# Patient Record
Sex: FEMALE | Race: BLACK OR AFRICAN AMERICAN | NOT HISPANIC OR LATINO | Employment: UNEMPLOYED | URBAN - METROPOLITAN AREA
[De-identification: names, ages, dates, MRNs, and addresses within clinical notes are randomized per-mention and may not be internally consistent; named-entity substitution may affect disease eponyms.]

---

## 2019-02-18 ENCOUNTER — TELEPHONE (OUTPATIENT)
Dept: RHEUMATOLOGY | Facility: CLINIC | Age: 39
End: 2019-02-18

## 2019-02-18 NOTE — TELEPHONE ENCOUNTER
----- Message from Sukhdev Mccallum sent at 2/18/2019 10:24 AM CST -----  Contact: self  Type:  Needs Medical Advice    Who Called: Ani Maldonado  Symptoms (please be specific): n/a   How long has patient had these symptoms:  n/a  Pharmacy name and phone #:  n/a  Would the patient rather a call back or a response via [facility]? Call back  Best Call Back Number: [phone]  Additional Information: check the status of referral from PCP to get seen.    Thanks,  Sukhdev Mccallum

## 2019-02-28 ENCOUNTER — TELEPHONE (OUTPATIENT)
Dept: PRIMARY CARE CLINIC | Facility: CLINIC | Age: 39
End: 2019-02-28

## 2019-03-01 ENCOUNTER — HOSPITAL ENCOUNTER (OUTPATIENT)
Dept: RADIOLOGY | Facility: HOSPITAL | Age: 39
Discharge: HOME | End: 2019-03-01
Attending: STUDENT IN AN ORGANIZED HEALTH CARE EDUCATION/TRAINING PROGRAM
Payer: MEDICAID

## 2019-03-01 ENCOUNTER — OFFICE VISIT (OUTPATIENT)
Dept: RHEUMATOLOGY | Facility: CLINIC | Age: 39
End: 2019-03-01
Payer: MEDICAID

## 2019-03-01 VITALS
HEIGHT: 59 IN | SYSTOLIC BLOOD PRESSURE: 129 MMHG | DIASTOLIC BLOOD PRESSURE: 88 MMHG | WEIGHT: 240.06 LBS | HEART RATE: 97 BPM | BODY MASS INDEX: 48.4 KG/M2

## 2019-03-01 DIAGNOSIS — Z87.2 HISTORY OF HIDRADENITIS SUPPURATIVA: ICD-10-CM

## 2019-03-01 DIAGNOSIS — M70.61 TROCHANTERIC BURSITIS OF BOTH HIPS: ICD-10-CM

## 2019-03-01 DIAGNOSIS — M22.2X9 PATELLOFEMORAL STRESS SYNDROME, UNSPECIFIED LATERALITY: ICD-10-CM

## 2019-03-01 DIAGNOSIS — M70.62 TROCHANTERIC BURSITIS OF BOTH HIPS: ICD-10-CM

## 2019-03-01 DIAGNOSIS — M22.2X9 PATELLOFEMORAL STRESS SYNDROME, UNSPECIFIED LATERALITY: Primary | ICD-10-CM

## 2019-03-01 PROCEDURE — 73521 X-RAY EXAM HIPS BI 2 VIEWS: CPT | Mod: 26,,, | Performed by: RADIOLOGY

## 2019-03-01 PROCEDURE — 73521 X-RAY EXAM HIPS BI 2 VIEWS: CPT | Mod: TC

## 2019-03-01 PROCEDURE — 73562 X-RAY EXAM OF KNEE 3: CPT | Mod: 26,50,, | Performed by: RADIOLOGY

## 2019-03-01 PROCEDURE — 99999 PR PBB SHADOW E&M-EST. PATIENT-LVL V: ICD-10-PCS | Mod: PBBFAC,,, | Performed by: STUDENT IN AN ORGANIZED HEALTH CARE EDUCATION/TRAINING PROGRAM

## 2019-03-01 PROCEDURE — 72202 X-RAY EXAM SI JOINTS 3/> VWS: CPT | Mod: TC

## 2019-03-01 PROCEDURE — 73562 XR KNEE ORTHO BILAT: ICD-10-PCS | Mod: 26,50,, | Performed by: RADIOLOGY

## 2019-03-01 PROCEDURE — 72202 XR SACROILIAC JOINTS COMPLETE: ICD-10-PCS | Mod: 26,,, | Performed by: RADIOLOGY

## 2019-03-01 PROCEDURE — 73562 X-RAY EXAM OF KNEE 3: CPT | Mod: TC,50

## 2019-03-01 PROCEDURE — 99204 PR OFFICE/OUTPT VISIT, NEW, LEVL IV, 45-59 MIN: ICD-10-PCS | Mod: S$PBB,,, | Performed by: STUDENT IN AN ORGANIZED HEALTH CARE EDUCATION/TRAINING PROGRAM

## 2019-03-01 PROCEDURE — 73521 XR HIPS BILATERAL 2 VIEW INCL AP PELVIS: ICD-10-PCS | Mod: 26,,, | Performed by: RADIOLOGY

## 2019-03-01 PROCEDURE — 99204 OFFICE O/P NEW MOD 45 MIN: CPT | Mod: S$PBB,,, | Performed by: STUDENT IN AN ORGANIZED HEALTH CARE EDUCATION/TRAINING PROGRAM

## 2019-03-01 PROCEDURE — 99215 OFFICE O/P EST HI 40 MIN: CPT | Mod: PBBFAC,25,PN | Performed by: STUDENT IN AN ORGANIZED HEALTH CARE EDUCATION/TRAINING PROGRAM

## 2019-03-01 PROCEDURE — 72202 X-RAY EXAM SI JOINTS 3/> VWS: CPT | Mod: 26,,, | Performed by: RADIOLOGY

## 2019-03-01 PROCEDURE — 99999 PR PBB SHADOW E&M-EST. PATIENT-LVL V: CPT | Mod: PBBFAC,,, | Performed by: STUDENT IN AN ORGANIZED HEALTH CARE EDUCATION/TRAINING PROGRAM

## 2019-03-01 RX ORDER — LEVOTHYROXINE SODIUM 50 UG/1
TABLET ORAL
COMMUNITY
Start: 2019-02-26 | End: 2019-12-11 | Stop reason: SDUPTHER

## 2019-03-01 RX ORDER — METFORMIN HYDROCHLORIDE 500 MG/1
TABLET ORAL
COMMUNITY
Start: 2019-02-26 | End: 2019-11-25 | Stop reason: SDUPTHER

## 2019-03-01 RX ORDER — FERROUS SULFATE 324(65)MG
324 TABLET, DELAYED RELEASE (ENTERIC COATED) ORAL 2 TIMES DAILY
Refills: 1 | COMMUNITY
Start: 2019-02-14 | End: 2021-09-23

## 2019-03-01 RX ORDER — HYDROCHLOROTHIAZIDE 25 MG/1
TABLET ORAL
COMMUNITY
End: 2020-03-03 | Stop reason: SDUPTHER

## 2019-03-01 RX ORDER — IBUPROFEN 800 MG/1
TABLET ORAL
COMMUNITY
End: 2019-05-19 | Stop reason: SDUPTHER

## 2019-03-01 RX ORDER — CLONAZEPAM 1 MG/1
TABLET ORAL
COMMUNITY
End: 2019-03-29 | Stop reason: SDUPTHER

## 2019-03-01 RX ORDER — GABAPENTIN 300 MG/1
CAPSULE ORAL
COMMUNITY
End: 2020-05-06

## 2019-03-01 RX ORDER — ATORVASTATIN CALCIUM 40 MG/1
TABLET, FILM COATED ORAL
COMMUNITY
End: 2019-07-24 | Stop reason: SDUPTHER

## 2019-03-01 RX ORDER — DICLOFENAC SODIUM 10 MG/G
2 GEL TOPICAL 4 TIMES DAILY
Qty: 1 TUBE | Refills: 5 | Status: SHIPPED | OUTPATIENT
Start: 2019-03-01 | End: 2021-06-08 | Stop reason: SDUPTHER

## 2019-03-01 RX ORDER — MEDROXYPROGESTERONE ACETATE 150 MG/ML
INJECTION, SUSPENSION INTRAMUSCULAR
COMMUNITY
Start: 2019-01-16 | End: 2021-06-29

## 2019-03-01 NOTE — PROGRESS NOTES
RHEUMATOLOGY OUTPATIENT CLINIC NOTE    3/1/2019    Attending Rheumatologist: Heather Escalante  Primary Care Provider: Jill Palacios MD   Physician Requesting Consultation: Aaareferral Self  No address on file  Chief Complaint/Reason For Consultation:  Disease Management      Subjective:       HPI  Erica Law is a 38 y.o.  AAF presents for evaluation of elevated inflammatory markers. CRP 45 per outside records. Has recurrent hx of hidradenitis suppurtiva. Not followed by dermatology. No hx pso/psa/SpA. No hx dactylitis, uveitis, achilles tendinitis. Does have some episodic lower back pain, no relief w nsaids. No improvement w activity. +Improvement w rest. +Occasional nightime awakening. No alternating buttock pain. No hx photosensitivity, raynauds, oral ulcers, hairloss, swollen joints , frothy urine.     Main complaint is bl knee pain, mainly suprapatellar. Also episodic, currently 3/10. Hurts mostly at night. No associated swelling. Not taking anything for pain relief. Also co bilateral hip pain, hurts when laying on side    14pt ros negative except as otherwise stated above    Review of Systems   Constitutional: Negative for fever, malaise/fatigue and weight loss.   HENT: Negative for congestion, hearing loss and sore throat.    Eyes: Negative for blurred vision, pain and redness.   Respiratory: Negative for cough, sputum production and shortness of breath.    Cardiovascular: Negative for chest pain, orthopnea and claudication.   Gastrointestinal: Negative for abdominal pain, blood in stool, diarrhea, heartburn and vomiting.   Genitourinary: Negative for dysuria and frequency.   Musculoskeletal: Positive for joint pain. Negative for back pain, myalgias and neck pain.   Skin: Negative for itching and rash.   Neurological: Negative for dizziness, focal weakness and weakness.   Endo/Heme/Allergies: Negative for polydipsia. Does not bruise/bleed easily.   Psychiatric/Behavioral: Negative for  depression and hallucinations.   All other systems reviewed and are negative.      Chronic comorbid conditions affecting medical decision making today:  Past Medical History:   Diagnosis Date    Anxiety     Diabetes mellitus     Hypertension     Thyroid disease      History reviewed. No pertinent surgical history.  Family History   Problem Relation Age of Onset    Diabetes Mellitus Mother     Cancer Maternal Grandmother     Cancer Maternal Grandfather      Social History     Substance and Sexual Activity   Alcohol Use Not on file     Social History     Tobacco Use   Smoking Status Current Every Day Smoker    Types: Vaping with nicotine   Smokeless Tobacco Never Used     Social History     Substance and Sexual Activity   Drug Use Not on file       Current Outpatient Medications:     atorvastatin (LIPITOR) 40 MG tablet, atorvastatin 40 mg tablet  TAKE 1 TABLET BY MOUTH AT BEDTIME, Disp: , Rfl:     clonazePAM (KLONOPIN) 1 MG tablet, clonazepam 1 mg tablet  take 1 tab bid prn, Disp: , Rfl:     ferrous sulfate 324 mg (65 mg iron) TbEC, Take 324 mg by mouth 2 (two) times daily., Disp: , Rfl: 1    gabapentin (NEURONTIN) 300 MG capsule, gabapentin 300 mg capsule, Disp: , Rfl:     hydroCHLOROthiazide (HYDRODIURIL) 25 MG tablet, hydrochlorothiazide 25 mg tablet  TAKE 1 TABLET BY MOUTH EVERY DAY, Disp: , Rfl:     ibuprofen (ADVIL,MOTRIN) 800 MG tablet, ibuprofen 800 mg tablet  TAKE 1 TABLET BY MOUTH THREE TIMES A DAY PRN PAIN, Disp: , Rfl:     iron-vit c-vit b12-folic acid (IRON-C PLUS) tablet, Iron 100 Plus 100 mg-250 mg-25 mcg-1 mg tablet  Take 1 tablet twice a day by oral route., Disp: , Rfl:     levothyroxine (SYNTHROID) 50 MCG tablet, , Disp: , Rfl:     medroxyPROGESTERone (DEPO-PROVERA) 150 mg/mL injection, , Disp: , Rfl:     metFORMIN (GLUCOPHAGE) 500 MG tablet, , Disp: , Rfl:     diclofenac sodium (VOLTAREN) 1 % Gel, Apply 2 grams topically 4 (four) times daily., Disp: 1 Tube, Rfl: 5        Objective:         Vitals:    03/01/19 1311   BP: 129/88   Pulse: 97     Physical Exam   Constitutional: She is oriented to person, place, and time and well-developed, well-nourished, and in no distress. No distress.   HENT:   Head: Normocephalic and atraumatic.   Eyes: Conjunctivae and EOM are normal. Pupils are equal, round, and reactive to light.   Neck: Normal range of motion. Neck supple.   Cardiovascular: Normal rate and normal heart sounds.    Pulmonary/Chest: Effort normal and breath sounds normal.   Abdominal: Soft. Bowel sounds are normal. She exhibits no distension.   Neurological: She is alert and oriented to person, place, and time. Gait normal.   Skin: Skin is warm and dry. No erythema.     Psychiatric: Affect and judgment normal.   Musculoskeletal: Normal range of motion. She exhibits no edema, tenderness or deformity.   No synovitis or effusions over large joints. No tenderness on palpation mcps or lateral squeeze. No nodules or tophi   +pain w patellar compression bilaterally; no effusions or warmth over knees. +ttp bilateral trochanteric bursa. No SI tenderness. Romero little    Reviewed old and all outside pertinent medical records available.    All lab results personally reviewed and interpreted by me.  Lab Results   Component Value Date    WBC 10.13 03/01/2019    HGB 9.3 (L) 03/01/2019    HCT 31.8 (L) 03/01/2019    MCV 93 03/01/2019    MCH 27.3 03/01/2019    MCHC 29.2 (L) 03/01/2019    RDW 14.1 03/01/2019     03/01/2019    MPV 9.9 03/01/2019       No results found for: NA, K, CL, CO2, GLU, BUN, LABCREA, CALCIUM, PROT, ALBUMIN, BILITOT, AST, ALKPHOS, ALT, GFRAA, GFRNONAA    No results found for: COLORU, APPEARANCEUA, SPECGRAV, PHUR, PROTEINUA, GLUCOSEU, KETONESU, BLOODU, LEUKOCYTESUR, NITRITE, UROBILINOGEN    No results found for: CRP    No results found for: SEDRATE, ERYTHROCYTES    No results found for: SHELLY, RF, SEDRATE    No components found for: 25OHVITDTOT, 53KYJHEP6, 09SGUDKZ7,  METHODNOTE    No results found for: URICACID    No components found for: TSPOTTB       ASSESSMENT / PLAN:     Erica Law is a 38 y.o. Black or  female with:      1. Patellofemoral stress syndrome, unspecified laterality  -Discussed quad strengthening exercises  -Ice prn / tylenol prn   -Prx voltaren gel prn  -Can consider injection at rtc if no improvement    #Trochanteric bursitis  -bilaterally  -discussed behavior / activity modification  -can consider injection at rtc if no improvement    #Hidradenitis suppurtiva  -suspect likely etiology for elevated inflammatory markers  -No other features or evidence suggestive of underlying connective tissue disease  -Will complete workup as below    - Ambulatory Referral to Physical/Occupational Therapy  - HLA B27 ANTIGEN; Future  - Sedimentation rate; Standing  - Vitamin D; Future  - Magnesium; Future  - Phosphorus; Future  - X-Ray Sacroiliac Joints Complete; Future  - X-Ray Hips Bilateral [employer] AP Pelvis; Future  - SHELLY Screen w/Reflex; Future  - C-reactive protein; Future  - Comprehensive metabolic panel; Future  - CBC auto differential; Standing  - Ambulatory Referral to Dermatology    rtc 2 weeks      Follow-up in about 2 weeks (around 3/15/2019).    Method of contact with patient concerns: Kellie danielle Rheumatology      Heather Escalante M.D.  Rheumatology Department   [facility] [place] [address], [place] [address]  Phone: [phone]  Fax: [phone]

## 2019-03-15 ENCOUNTER — OFFICE VISIT (OUTPATIENT)
Dept: PRIMARY CARE CLINIC | Facility: CLINIC | Age: 39
End: 2019-03-15
Payer: MEDICAID

## 2019-03-15 VITALS
SYSTOLIC BLOOD PRESSURE: 100 MMHG | OXYGEN SATURATION: 99 % | TEMPERATURE: 98 F | DIASTOLIC BLOOD PRESSURE: 78 MMHG | HEART RATE: 83 BPM

## 2019-03-15 DIAGNOSIS — E03.9 HYPOTHYROIDISM, UNSPECIFIED TYPE: ICD-10-CM

## 2019-03-15 DIAGNOSIS — F41.9 ANXIETY: ICD-10-CM

## 2019-03-15 DIAGNOSIS — I10 ESSENTIAL HYPERTENSION: ICD-10-CM

## 2019-03-15 DIAGNOSIS — E61.1 IRON DEFICIENCY: ICD-10-CM

## 2019-03-15 DIAGNOSIS — R73.03 PREDIABETES: ICD-10-CM

## 2019-03-15 DIAGNOSIS — E78.5 HYPERLIPIDEMIA, UNSPECIFIED HYPERLIPIDEMIA TYPE: ICD-10-CM

## 2019-03-15 DIAGNOSIS — L73.2 HIDRADENITIS SUPPURATIVA: Primary | ICD-10-CM

## 2019-03-15 PROCEDURE — 99214 PR OFFICE/OUTPT VISIT, EST, LEVL IV, 30-39 MIN: ICD-10-PCS | Mod: S$GLB,,, | Performed by: FAMILY MEDICINE

## 2019-03-15 PROCEDURE — 99214 OFFICE O/P EST MOD 30 MIN: CPT | Mod: S$GLB,,, | Performed by: FAMILY MEDICINE

## 2019-03-15 RX ORDER — CLINDAMYCIN HYDROCHLORIDE 300 MG/1
300 CAPSULE ORAL EVERY 6 HOURS
Qty: 40 CAPSULE | Refills: 0 | Status: SHIPPED | OUTPATIENT
Start: 2019-03-15 | End: 2019-03-25

## 2019-03-15 RX ORDER — SULFAMETHOXAZOLE AND TRIMETHOPRIM 800; 160 MG/1; MG/1
1 TABLET ORAL DAILY
Qty: 10 TABLET | Refills: 0 | Status: SHIPPED | OUTPATIENT
Start: 2019-03-15 | End: 2019-03-25

## 2019-03-15 NOTE — PROGRESS NOTES
Subjective:       Patient ID: Erica Law is a 38 y.o. female.    Chief Complaint: Recurrent Skin Infections (On the her right buttock that started in Dec and intermittently returns. )    F/u of chronic medical conditions. Pt notes wound of R buttock that she is worried may be infected. She reports that it is painful when she sits but is not sure if there has been drainage. She does note new draining lesions of R axilla. No f/c, n/v, weakness, fatigue.    HTN - controlled    Hypothyroidism - on synthroid    HLD - on statin    PreDM - on metformin, attempting to follow healthier diet    Anxiety - has upcoming appt with psyc    Iron deficiency - compliant with iron    HPI  Review of Systems   Constitutional: Negative for chills, diaphoresis, fatigue, fever and unexpected weight change.   HENT: Negative for congestion, hearing loss, rhinorrhea, sinus pressure, sore throat, trouble swallowing and voice change.    Eyes: Negative for pain and visual disturbance.   Respiratory: Negative for cough, chest tightness, shortness of breath and wheezing.    Cardiovascular: Negative for chest pain, palpitations and leg swelling.   Gastrointestinal: Negative for abdominal pain, constipation, diarrhea, nausea and vomiting.   Genitourinary: Negative for decreased urine volume, dysuria, flank pain, frequency, hematuria, pelvic pain, vaginal bleeding and vaginal discharge.   Musculoskeletal: Negative for arthralgias, back pain, myalgias and neck pain.   Skin: Positive for wound. Negative for color change, pallor and rash.   Neurological: Negative for dizziness, syncope, weakness, light-headedness and headaches.   Hematological: Negative for adenopathy.   Psychiatric/Behavioral: Negative for decreased concentration, dysphoric mood and sleep disturbance. The patient is nervous/anxious.        Objective:      Physical Exam   Constitutional: She is oriented to person, place, and time. She appears well-developed and well-nourished. No  distress.   HENT:   Head: Normocephalic and atraumatic.   Nose: Nose normal.   Mouth/Throat: Oropharynx is clear and moist.   Eyes: Conjunctivae are normal. Pupils are equal, round, and reactive to light.   Neck: Normal range of motion. Neck supple. No JVD present. No thyromegaly present.   Cardiovascular: Normal rate, regular rhythm, normal heart sounds and intact distal pulses. Exam reveals no gallop and no friction rub.   No murmur heard.  Pulmonary/Chest: Effort normal and breath sounds normal. No stridor. No respiratory distress. She has no wheezes. She has no rales.   Abdominal: Soft. Bowel sounds are normal. She exhibits no distension. There is no tenderness.   Musculoskeletal: Normal range of motion. She exhibits no edema or tenderness.   Lymphadenopathy:     She has no cervical adenopathy.   Neurological: She is alert and oriented to person, place, and time.   Skin: Skin is warm and dry. No rash noted. She is not diaphoretic. No erythema. No pallor.   Stage 3 pressure ulcer R buttock, no soi. B/l axilla multiple small abscesses, no erythema, extensive keloid scarring   Psychiatric: She has a normal mood and affect. Her behavior is normal.   Vitals reviewed.      Assessment:       1. Hidradenitis suppurativa    2. Essential hypertension    3. Hyperlipidemia, unspecified hyperlipidemia type    4. Hypothyroidism, unspecified type    5. Prediabetes    6. Anxiety    7. Iron deficiency        Plan:       Erica was seen today for recurrent skin infections.    Diagnoses and all orders for this visit:    Hidradenitis suppurativa  -     clindamycin (CLEOCIN) 300 MG capsule; Take 1 capsule (300 mg total) by mouth every 6 (six) hours. for 10 days  -     sulfamethoxazole-trimethoprim 800-160mg (BACTRIM DS) 800-160 mg Tab; Take 1 tablet by mouth once daily. for 10 days    Essential hypertension  Comments:  controlled on current    Hyperlipidemia, unspecified hyperlipidemia type  Comments:  statin    Hypothyroidism,  unspecified type  Comments:  on synthroid    Prediabetes  Comments:  metformin, diet/exercise    Anxiety  Comments:  controlled currently, has appt with psyc    Iron deficiency  Comments:  on iron supplements

## 2019-03-21 ENCOUNTER — OFFICE VISIT (OUTPATIENT)
Dept: RHEUMATOLOGY | Facility: CLINIC | Age: 39
End: 2019-03-21
Payer: MEDICAID

## 2019-03-21 VITALS — BODY MASS INDEX: 46.01 KG/M2 | WEIGHT: 234.38 LBS | HEIGHT: 60 IN

## 2019-03-21 DIAGNOSIS — L73.2 SUPPURATIVE HIDRADENITIS: Primary | ICD-10-CM

## 2019-03-21 PROCEDURE — 99999 PR PBB SHADOW E&M-EST. PATIENT-LVL IV: CPT | Mod: PBBFAC,,, | Performed by: STUDENT IN AN ORGANIZED HEALTH CARE EDUCATION/TRAINING PROGRAM

## 2019-03-21 PROCEDURE — 99214 OFFICE O/P EST MOD 30 MIN: CPT | Mod: S$PBB,,, | Performed by: STUDENT IN AN ORGANIZED HEALTH CARE EDUCATION/TRAINING PROGRAM

## 2019-03-21 PROCEDURE — 99214 OFFICE O/P EST MOD 30 MIN: CPT | Mod: PBBFAC,PN | Performed by: STUDENT IN AN ORGANIZED HEALTH CARE EDUCATION/TRAINING PROGRAM

## 2019-03-21 PROCEDURE — 99214 PR OFFICE/OUTPT VISIT, EST, LEVL IV, 30-39 MIN: ICD-10-PCS | Mod: S$PBB,,, | Performed by: STUDENT IN AN ORGANIZED HEALTH CARE EDUCATION/TRAINING PROGRAM

## 2019-03-21 PROCEDURE — 99999 PR PBB SHADOW E&M-EST. PATIENT-LVL IV: ICD-10-PCS | Mod: PBBFAC,,, | Performed by: STUDENT IN AN ORGANIZED HEALTH CARE EDUCATION/TRAINING PROGRAM

## 2019-03-21 RX ORDER — MUPIROCIN 20 MG/G
OINTMENT TOPICAL 3 TIMES DAILY
Qty: 1 TUBE | Refills: 5 | Status: SHIPPED | OUTPATIENT
Start: 2019-03-21 | End: 2021-06-29

## 2019-03-21 NOTE — PROGRESS NOTES
RHEUMATOLOGY OUTPATIENT CLINIC NOTE    3/21/2019    Attending Rheumatologist: Heather Escalante  Primary Care Provider: Jill Palacios MD   Physician Requesting Consultation: No referring provider defined for this encounter.  Chief Complaint/Reason For Consultation:  Hidradenitis suppurativa    Subjective:       HPI  Historical:  Erica Law is a 38 y.o.  AAF presents for evaluation of elevated inflammatory markers. CRP 45 per outside records. Has recurrent hx of hidradenitis suppurtiva. Not followed by dermatology. No hx pso/psa/SpA. No hx dactylitis, uveitis, achilles tendinitis. Does have some episodic lower back pain, no relief w nsaids. No improvement w activity. +Improvement w rest. +Occasional nightime awakening. No alternating buttock pain. No hx photosensitivity, raynauds, oral ulcers, hairloss, swollen joints , frothy urine.     Main complaint is bl knee pain, mainly suprapatellar. Also episodic, currently 3/10. Hurts mostly at night. No associated swelling. Not taking anything for pain relief. Also co bilateral hip pain, hurts when laying on side    Today:  Pt denies any joint pain at all today. No knee, hip, or lower back pain. Pain 0/10.     14pt ros negative except as otherwise stated above    Review of Systems   Constitutional: Negative for fever, malaise/fatigue and weight loss.   HENT: Negative for congestion, hearing loss and sore throat.    Eyes: Negative for blurred vision, pain and redness.   Respiratory: Negative for cough, sputum production and shortness of breath.    Cardiovascular: Negative for chest pain, orthopnea and claudication.   Gastrointestinal: Negative for abdominal pain, blood in stool, diarrhea, heartburn and vomiting.   Genitourinary: Negative for dysuria and frequency.   Musculoskeletal: Positive for joint pain. Negative for back pain, myalgias and neck pain.   Skin: Negative for itching and rash.   Neurological: Negative for dizziness, focal weakness and weakness.    Endo/Heme/Allergies: Negative for polydipsia. Does not bruise/bleed easily.   Psychiatric/Behavioral: Negative for depression and hallucinations.   All other systems reviewed and are negative.      Chronic comorbid conditions affecting medical decision making today:  Past Medical History:   Diagnosis Date    Anxiety     Diabetes mellitus     Hypertension     Thyroid disease      History reviewed. No pertinent surgical history.  Family History   Problem Relation Age of Onset    Diabetes Mellitus Mother     Cancer Maternal Grandmother     Cancer Maternal Grandfather      Social History     Substance and Sexual Activity   Alcohol Use Not on file     Social History     Tobacco Use   Smoking Status Current Every Day Smoker    Types: Vaping with nicotine   Smokeless Tobacco Never Used     Social History     Substance and Sexual Activity   Drug Use Not on file       Current Outpatient Medications:     atorvastatin (LIPITOR) 40 MG tablet, atorvastatin 40 mg tablet  TAKE 1 TABLET BY MOUTH AT BEDTIME, Disp: , Rfl:     clindamycin (CLEOCIN) 300 MG capsule, Take 1 capsule (300 mg total) by mouth every 6 (six) hours. for 10 days, Disp: 40 capsule, Rfl: 0    clonazePAM (KLONOPIN) 1 MG tablet, clonazepam 1 mg tablet  take 1 tab bid prn, Disp: , Rfl:     diclofenac sodium (VOLTAREN) 1 % Gel, Apply 2 grams topically 4 (four) times daily., Disp: 1 Tube, Rfl: 5    ferrous sulfate 324 mg (65 mg iron) TbEC, Take 324 mg by mouth 2 (two) times daily., Disp: , Rfl: 1    gabapentin (NEURONTIN) 300 MG capsule, gabapentin 300 mg capsule, Disp: , Rfl:     hydroCHLOROthiazide (HYDRODIURIL) 25 MG tablet, hydrochlorothiazide 25 mg tablet  TAKE 1 TABLET BY MOUTH EVERY DAY, Disp: , Rfl:     ibuprofen (ADVIL,MOTRIN) 800 MG tablet, ibuprofen 800 mg tablet  TAKE 1 TABLET BY MOUTH THREE TIMES A DAY PRN PAIN, Disp: , Rfl:     iron-vit c-vit b12-folic acid (IRON-C PLUS) tablet, Iron 100 Plus 100 mg-250 mg-25 mcg-1 mg tablet  Take 1  tablet twice a day by oral route., Disp: , Rfl:     levothyroxine (SYNTHROID) 50 MCG tablet, , Disp: , Rfl:     medroxyPROGESTERone (DEPO-PROVERA) 150 mg/mL injection, , Disp: , Rfl:     metFORMIN (GLUCOPHAGE) 500 MG tablet, , Disp: , Rfl:     sulfamethoxazole-trimethoprim 800-160mg (BACTRIM DS) 800-160 mg Tab, Take 1 tablet by mouth once daily. for 10 days, Disp: 10 tablet, Rfl: 0    mupirocin (BACTROBAN) 2 % ointment, Apply topically 3 (three) times daily., Disp: 1 Tube, Rfl: 5       Objective:         There were no vitals filed for this visit.  Physical Exam   Constitutional: She is oriented to person, place, and time and well-developed, well-nourished, and in no distress. No distress.   HENT:   Head: Normocephalic and atraumatic.   Eyes: Conjunctivae and EOM are normal. Pupils are equal, round, and reactive to light.   Neck: Normal range of motion. Neck supple.   Cardiovascular: Normal rate and normal heart sounds.    Pulmonary/Chest: Effort normal and breath sounds normal.   Abdominal: Soft. Bowel sounds are normal. She exhibits no distension.   Neurological: She is alert and oriented to person, place, and time. Gait normal.   Skin: Skin is warm and dry. No erythema.     Psychiatric: Affect and judgment normal.   Musculoskeletal: Normal range of motion. She exhibits no edema, tenderness or deformity.   No synovitis or effusions over large joints. No tenderness on palpation mcps or lateral squeeze. No nodules or tophi   +pain w patellar compression bilaterally; no effusions or warmth over knees. +ttp bilateral trochanteric bursa. No SI tenderness. Romero little    Reviewed old and all outside pertinent medical records available.    All lab results personally reviewed and interpreted by me.  Lab Results   Component Value Date    WBC 10.13 03/01/2019    HGB 9.3 (L) 03/01/2019    HCT 31.8 (L) 03/01/2019    MCV 93 03/01/2019    MCH 27.3 03/01/2019    MCHC 29.2 (L) 03/01/2019    RDW 14.1 03/01/2019      03/01/2019    MPV 9.9 03/01/2019       Lab Results   Component Value Date     03/01/2019    K 3.9 03/01/2019     (H) 03/01/2019    CO2 23 03/01/2019     (H) 03/01/2019    BUN 16 03/01/2019    CALCIUM 9.7 03/01/2019    PROT 8.3 03/01/2019    ALBUMIN 3.0 (L) 03/01/2019    BILITOT 0.2 03/01/2019    AST 11 03/01/2019    ALKPHOS 64 03/01/2019    ALT 13 03/01/2019       No results found for: COLORU, APPEARANCEUA, SPECGRAV, PHUR, PROTEINUA, GLUCOSEU, KETONESU, BLOODU, LEUKOCYTESUR, NITRITE, UROBILINOGEN    Lab Results   Component Value Date    CRP 39.2 (H) 03/01/2019       Lab Results   Component Value Date    SEDRATE 80 (H) 03/01/2019       Lab Results   Component Value Date    SEDRATE 80 (H) 03/01/2019       No components found for: 25OHVITDTOT, 38WGSRSL0, 60GXNGLV7, METHODNOTE    No results found for: URICACID    No components found for: TSPOTTB     SHELLY-  HLAB27-    Knee XR  Joint spaces are maintained.  No marginal spurring.  No fracture or dislocation.  Possible bilateral effusions.    Hip XR  Overlying bowel gas limits evaluation of the lower sacrum.  Hip joints are symmetric in appearance.  No fracture or dislocation.  Pelvic ring is intact.    XR SI  The sacroiliac joints are intact with symmetric appearance.  No areas of erosion or sclerosis are identified.  No evidence of ankylosis.  No acute osseous abnormality.   ASSESSMENT / PLAN:     Erica Law is a 38 y.o. Black or  female with:      1. Patellofemoral stress syndrome, unspecified laterality  -Discussed quad strengthening exercises  -Ice prn / tylenol prn   -Prx voltaren gel prn    #Trochanteric bursitis  -bilaterally  -discussed behavior / activity modification  -can consider injection at rtc if no improvement    #Hidradenitis suppurtiva  -suspect likely etiology for elevated inflammatory markers  -No other features or evidence suggestive of underlying connective tissue disease  -Referral placed to dermatology and  general surgery for evaluation further treatment options       rtc prn      No Follow-up on file.    Method of contact with patient concerns: Kellie attn Rheumatology      Heather Escalante M.D.  Rheumatology Department   [facility] [place] [address], [place] [address]  Phone: [phone]  Fax: [phone]

## 2019-03-29 ENCOUNTER — OFFICE VISIT (OUTPATIENT)
Dept: PRIMARY CARE CLINIC | Facility: CLINIC | Age: 39
End: 2019-03-29
Payer: MEDICAID

## 2019-03-29 ENCOUNTER — TELEPHONE (OUTPATIENT)
Dept: PRIMARY CARE CLINIC | Facility: CLINIC | Age: 39
End: 2019-03-29

## 2019-03-29 VITALS
HEART RATE: 88 BPM | DIASTOLIC BLOOD PRESSURE: 80 MMHG | BODY MASS INDEX: 48.04 KG/M2 | OXYGEN SATURATION: 98 % | TEMPERATURE: 98 F | WEIGHT: 246 LBS | SYSTOLIC BLOOD PRESSURE: 110 MMHG

## 2019-03-29 DIAGNOSIS — L02.91 ABSCESS: Primary | ICD-10-CM

## 2019-03-29 DIAGNOSIS — F41.9 ANXIETY: ICD-10-CM

## 2019-03-29 PROCEDURE — 99214 PR OFFICE/OUTPT VISIT, EST, LEVL IV, 30-39 MIN: ICD-10-PCS | Mod: S$GLB,,, | Performed by: FAMILY MEDICINE

## 2019-03-29 PROCEDURE — 99214 OFFICE O/P EST MOD 30 MIN: CPT | Mod: S$GLB,,, | Performed by: FAMILY MEDICINE

## 2019-03-29 RX ORDER — CLONAZEPAM 1 MG/1
1 TABLET ORAL 2 TIMES DAILY PRN
Qty: 60 TABLET | Refills: 0 | Status: SHIPPED | OUTPATIENT
Start: 2019-03-29 | End: 2019-04-30 | Stop reason: SDUPTHER

## 2019-03-29 NOTE — PROGRESS NOTES
Subjective:       Patient ID: Erica Law is a 38 y.o. female.    Chief Complaint: Follow-up (refill) and Rash (under the stomach/vaginal area-swollen with some pain, for the past 4 days)    HPI     Here for mass of R suprapubic area. Pt says it has been present for 4-5 days and is painful. She has been taking ibuprofen for pain with improvement. No fever, chills, drainage, erythema. She has had similar events in the past (abscesses) but those were of axillary areas.    Review of Systems   Constitutional: Negative for chills, diaphoresis, fatigue, fever and unexpected weight change.   Eyes: Negative for pain and visual disturbance.   Respiratory: Negative for cough, chest tightness, shortness of breath and wheezing.    Cardiovascular: Negative for chest pain, palpitations and leg swelling.   Gastrointestinal: Negative for abdominal pain, constipation, diarrhea, nausea and vomiting.   Genitourinary: Negative for dysuria and frequency.   Musculoskeletal: Positive for myalgias.   Skin: Negative for rash.   Neurological: Negative for dizziness, syncope, weakness, light-headedness and headaches.   Hematological: Negative for adenopathy.   Psychiatric/Behavioral: Negative for decreased concentration, dysphoric mood and sleep disturbance. The patient is not nervous/anxious.        Objective:      Physical Exam   Constitutional: She is oriented to person, place, and time. She appears well-developed and well-nourished. No distress.   HENT:   Head: Normocephalic and atraumatic.   Nose: Nose normal.   Mouth/Throat: Oropharynx is clear and moist.   Eyes: Conjunctivae are normal.   Neck: Normal range of motion. Neck supple.   Cardiovascular: Normal rate, regular rhythm and normal heart sounds. Exam reveals no gallop and no friction rub.   No murmur heard.  Pulmonary/Chest: Effort normal and breath sounds normal. No stridor. No respiratory distress. She has no wheezes.   Abdominal: Soft. Bowel sounds are normal.    Neurological: She is alert and oriented to person, place, and time.   Skin: Skin is warm and dry. She is not diaphoretic.   Large firm mass of R suprapubic area on deep palpation   Psychiatric: She has a normal mood and affect. Her behavior is normal.   Vitals reviewed.      Assessment:       1. Abscess    2. Anxiety        Plan:       Erica was seen today for follow-up and rash.    Diagnoses and all orders for this visit:    Abscess  -     US Abdomen Limited; Future  -     US Abdomen Limited  -     US Extremity Non Vascular Complete Right; Future  -     US Extremity Non Vascular Complete Right    Anxiety  -     clonazePAM (KLONOPIN) 1 MG tablet; Take 1 tablet (1 mg total) by mouth 2 (two) times daily as needed for Anxiety.    Suspected deep abscess, will get ultrasound - pt scheduled for 11:30. Will f/u report and notify of results. Advised that she will likely need to be evaluated in ED.

## 2019-04-30 ENCOUNTER — OFFICE VISIT (OUTPATIENT)
Dept: PRIMARY CARE CLINIC | Facility: CLINIC | Age: 39
End: 2019-04-30
Payer: MEDICAID

## 2019-04-30 VITALS
HEART RATE: 93 BPM | DIASTOLIC BLOOD PRESSURE: 70 MMHG | SYSTOLIC BLOOD PRESSURE: 118 MMHG | OXYGEN SATURATION: 99 % | BODY MASS INDEX: 46.68 KG/M2 | WEIGHT: 239 LBS

## 2019-04-30 DIAGNOSIS — F41.9 ANXIETY: ICD-10-CM

## 2019-04-30 DIAGNOSIS — I10 ESSENTIAL HYPERTENSION: ICD-10-CM

## 2019-04-30 DIAGNOSIS — E03.9 HYPOTHYROIDISM, UNSPECIFIED TYPE: ICD-10-CM

## 2019-04-30 DIAGNOSIS — E78.5 HYPERLIPIDEMIA, UNSPECIFIED HYPERLIPIDEMIA TYPE: ICD-10-CM

## 2019-04-30 DIAGNOSIS — L73.2 HIDRADENITIS SUPPURATIVA: Primary | ICD-10-CM

## 2019-04-30 DIAGNOSIS — R73.03 PREDIABETES: ICD-10-CM

## 2019-04-30 PROCEDURE — 99214 PR OFFICE/OUTPT VISIT, EST, LEVL IV, 30-39 MIN: ICD-10-PCS | Mod: S$GLB,,, | Performed by: FAMILY MEDICINE

## 2019-04-30 PROCEDURE — 99214 OFFICE O/P EST MOD 30 MIN: CPT | Mod: S$GLB,,, | Performed by: FAMILY MEDICINE

## 2019-04-30 RX ORDER — CLONAZEPAM 1 MG/1
1 TABLET ORAL 2 TIMES DAILY PRN
Qty: 60 TABLET | Refills: 0 | Status: SHIPPED | OUTPATIENT
Start: 2019-04-30 | End: 2019-05-30 | Stop reason: SDUPTHER

## 2019-04-30 RX ORDER — CLINDAMYCIN HYDROCHLORIDE 300 MG/1
300 CAPSULE ORAL EVERY 6 HOURS
Qty: 20 CAPSULE | Refills: 0 | Status: SHIPPED | OUTPATIENT
Start: 2019-04-30 | End: 2019-05-05

## 2019-04-30 RX ORDER — SULFAMETHOXAZOLE AND TRIMETHOPRIM 800; 160 MG/1; MG/1
1 TABLET ORAL DAILY
Qty: 5 TABLET | Refills: 0 | Status: SHIPPED | OUTPATIENT
Start: 2019-04-30 | End: 2019-05-05

## 2019-04-30 NOTE — PROGRESS NOTES
Subjective:       Patient ID: Erica Law is a 39 y.o. female.    Chief Complaint: Follow-up (Refill klonopin)    HPI     F/u of chronic medical conditions. Pt hospitalized recently after found to have large abd abscess. gen surg saw pt inpatient and took to OR. 4 abd abscess found and I&D'd. She has noticed a small firm lesion of RLE but no pain or erythema, f/c.     Anxiety - needs refill of clonazepam, does not take before driving. Takes bid prn.    Hypothyroidism - on synthroid, compliant    Predm - on metformin, attempting dietary modifications    NEY - takes iron bid or tid, depending on gi     HTN - controlled on current    HLD - on statin      Review of Systems   Constitutional: Negative for chills, fatigue and fever.   Eyes: Negative for visual disturbance.   Respiratory: Negative for cough, chest tightness, shortness of breath and wheezing.    Cardiovascular: Negative for chest pain and palpitations.   Gastrointestinal: Negative for abdominal pain, constipation, diarrhea, nausea and vomiting.   Genitourinary: Negative for dysuria, frequency and pelvic pain.   Musculoskeletal: Positive for myalgias. Negative for arthralgias.   Skin: Negative for color change, pallor and rash.   Neurological: Negative for dizziness, syncope, weakness, light-headedness and headaches.   Psychiatric/Behavioral: Negative for dysphoric mood and sleep disturbance. The patient is nervous/anxious.        Objective:      Physical Exam   Constitutional: She is oriented to person, place, and time. She appears well-developed and well-nourished. No distress.   HENT:   Head: Normocephalic and atraumatic.   Nose: Nose normal.   Mouth/Throat: Oropharynx is clear and moist.   Eyes: Conjunctivae are normal.   Neck: Normal range of motion. Neck supple. No thyromegaly present.   Cardiovascular: Normal rate, regular rhythm and normal heart sounds. Exam reveals no gallop and no friction rub.   No murmur heard.  Pulmonary/Chest: Effort  normal and breath sounds normal. No stridor. No respiratory distress. She has no wheezes.   Abdominal: Soft. Bowel sounds are normal.   Musculoskeletal: She exhibits no edema.   Neurological: She is alert and oriented to person, place, and time.   Skin: Skin is warm and dry. She is not diaphoretic. No pallor.   Chronic scarring b/l axilla, small (about 1 cm) firm lesion of RLE. No wound, erythema, or tenderness   Psychiatric: She has a normal mood and affect. Her behavior is normal.   Vitals reviewed.      Assessment:       1. Hidradenitis suppurativa    2. Anxiety    3. Essential hypertension    4. Hyperlipidemia, unspecified hyperlipidemia type    5. Hypothyroidism, unspecified type    6. Prediabetes        Plan:       Erica was seen today for follow-up.    Diagnoses and all orders for this visit:    Hidradenitis suppurativa  Comments:  given ed precautions  Orders:  -     sulfamethoxazole-trimethoprim 800-160mg (BACTRIM DS) 800-160 mg Tab; Take 1 tablet by mouth once daily. for 5 days  -     clindamycin (CLEOCIN) 300 MG capsule; Take 1 capsule (300 mg total) by mouth every 6 (six) hours. for 5 days    Anxiety  -     clonazePAM (KLONOPIN) 1 MG tablet; Take 1 tablet (1 mg total) by mouth 2 (two) times daily as needed for Anxiety.    Essential hypertension  Comments:  controlled    Hyperlipidemia, unspecified hyperlipidemia type  Comments:  statin    Hypothyroidism, unspecified type  Comments:  continue current dose synthroid    Prediabetes  Comments:  continie metformin

## 2019-05-09 ENCOUNTER — OFFICE VISIT (OUTPATIENT)
Dept: PRIMARY CARE CLINIC | Facility: CLINIC | Age: 39
End: 2019-05-09
Payer: MEDICAID

## 2019-05-09 VITALS — HEART RATE: 96 BPM | SYSTOLIC BLOOD PRESSURE: 120 MMHG | DIASTOLIC BLOOD PRESSURE: 80 MMHG | OXYGEN SATURATION: 99 %

## 2019-05-09 DIAGNOSIS — L73.2 HIDRADENITIS SUPPURATIVA: Primary | ICD-10-CM

## 2019-05-09 PROCEDURE — 99213 OFFICE O/P EST LOW 20 MIN: CPT | Mod: S$GLB,,, | Performed by: FAMILY MEDICINE

## 2019-05-09 PROCEDURE — 99213 PR OFFICE/OUTPT VISIT, EST, LEVL III, 20-29 MIN: ICD-10-PCS | Mod: S$GLB,,, | Performed by: FAMILY MEDICINE

## 2019-05-09 NOTE — PROGRESS NOTES
Subjective:       Patient ID: Erica Law is a 39 y.o. female.    Chief Complaint: Follow-up    HPI     F/u HS - pt notes improvement in RLE lesion. Also reports that she was noting some mild drainage from abd incision (from abscess I&D) which has also improved. She is trying to keep the area dry. No f/c, n/v, fatigue, erythema.     Review of Systems   Constitutional: Negative for chills, fatigue and fever.   Eyes: Negative for visual disturbance.   Respiratory: Negative for cough and shortness of breath.    Cardiovascular: Negative for chest pain and palpitations.   Gastrointestinal: Negative for abdominal pain, nausea and vomiting.   Genitourinary: Negative for dysuria and frequency.   Musculoskeletal: Negative for arthralgias and myalgias.   Skin: Positive for wound. Negative for color change, pallor and rash.   Neurological: Negative for dizziness, weakness, light-headedness and headaches.       Objective:      Physical Exam   Constitutional: She is oriented to person, place, and time. She appears well-developed and well-nourished. No distress.   HENT:   Head: Normocephalic and atraumatic.   Eyes: Conjunctivae are normal.   Neck: Normal range of motion. Neck supple.   Cardiovascular: Normal rate, regular rhythm and normal heart sounds. Exam reveals no gallop and no friction rub.   No murmur heard.  Pulmonary/Chest: Effort normal and breath sounds normal. No stridor. No respiratory distress. She has no wheezes.   Abdominal: Soft. Bowel sounds are normal.   Small (about 1 cm) superficial opening of abd incision site, without SOI    Musculoskeletal: She exhibits no edema.   Neurological: She is alert and oriented to person, place, and time.   Skin: Skin is warm and dry. She is not diaphoretic. No pallor.   Psychiatric: She has a normal mood and affect. Her behavior is normal.   Vitals reviewed.      Assessment:       1. Hidradenitis suppurativa        Plan:       Erica was seen today for  follow-up.    Diagnoses and all orders for this visit:    Hidradenitis suppurativa  Comments:  completed course of abx with improvement, continue to monitor closely

## 2019-05-19 RX ORDER — IBUPROFEN 800 MG/1
TABLET ORAL
Qty: 270 TABLET | Refills: 0 | Status: SHIPPED | OUTPATIENT
Start: 2019-05-19 | End: 2019-08-19 | Stop reason: SDUPTHER

## 2019-05-23 ENCOUNTER — TELEPHONE (OUTPATIENT)
Dept: PRIMARY CARE CLINIC | Facility: CLINIC | Age: 39
End: 2019-05-23

## 2019-05-24 RX ORDER — SULFAMETHOXAZOLE AND TRIMETHOPRIM 800; 160 MG/1; MG/1
1 TABLET ORAL 2 TIMES DAILY
Qty: 14 TABLET | Refills: 0 | Status: SHIPPED | OUTPATIENT
Start: 2019-05-24 | End: 2019-05-31

## 2019-05-30 DIAGNOSIS — F41.9 ANXIETY: Primary | ICD-10-CM

## 2019-05-31 RX ORDER — CLONAZEPAM 1 MG/1
1 TABLET ORAL 2 TIMES DAILY PRN
Qty: 60 TABLET | Refills: 0 | Status: SHIPPED | OUTPATIENT
Start: 2019-05-31 | End: 2019-06-25 | Stop reason: SDUPTHER

## 2019-06-13 ENCOUNTER — OFFICE VISIT (OUTPATIENT)
Dept: PRIMARY CARE CLINIC | Facility: CLINIC | Age: 39
End: 2019-06-13
Payer: MEDICAID

## 2019-06-13 VITALS
HEART RATE: 108 BPM | WEIGHT: 230 LBS | BODY MASS INDEX: 44.92 KG/M2 | SYSTOLIC BLOOD PRESSURE: 122 MMHG | OXYGEN SATURATION: 99 % | DIASTOLIC BLOOD PRESSURE: 88 MMHG

## 2019-06-13 DIAGNOSIS — F32.A DEPRESSION, UNSPECIFIED DEPRESSION TYPE: ICD-10-CM

## 2019-06-13 DIAGNOSIS — E03.9 HYPOTHYROIDISM, UNSPECIFIED TYPE: ICD-10-CM

## 2019-06-13 DIAGNOSIS — L73.2 HIDRADENITIS SUPPURATIVA: ICD-10-CM

## 2019-06-13 DIAGNOSIS — I10 ESSENTIAL HYPERTENSION: ICD-10-CM

## 2019-06-13 DIAGNOSIS — Z00.00 PREVENTATIVE HEALTH CARE: Primary | ICD-10-CM

## 2019-06-13 DIAGNOSIS — R73.09 ELEVATED GLUCOSE: ICD-10-CM

## 2019-06-13 PROCEDURE — 99214 OFFICE O/P EST MOD 30 MIN: CPT | Mod: S$GLB,,, | Performed by: FAMILY MEDICINE

## 2019-06-13 PROCEDURE — 99214 PR OFFICE/OUTPT VISIT, EST, LEVL IV, 30-39 MIN: ICD-10-PCS | Mod: S$GLB,,, | Performed by: FAMILY MEDICINE

## 2019-06-13 RX ORDER — ESCITALOPRAM OXALATE 5 MG/1
5 TABLET ORAL DAILY
Qty: 30 TABLET | Refills: 1 | Status: SHIPPED | OUTPATIENT
Start: 2019-06-13 | End: 2019-08-10 | Stop reason: SDUPTHER

## 2019-06-13 NOTE — PROGRESS NOTES
Subjective:       Patient ID: Erica Law is a 39 y.o. female.    Chief Complaint: Follow-up    HPI     F/u of chronic medical conditions.    HS - needs referral to derm for formal dx. She has an appt with derm in [place] In oct but would like to expedite, willing to pay out of pocket for an appt here. She was recently seen in ED for b/l axilla abscesses, was given IV abx in ED and dc'd home with rx for a week of abx. The abscess on her lower abd drained with warm compresses. She did see a (plastics?) specialist who rec'd multiple surgeries and a skin graft. Pt would like a second opinion.    Depression - pt reports feeling depressed due to chronic disease. She feels frustrated that her family members sometimes have to help care for her and she is having difficulty working.    HTN - controlled on current    Hypothyroidism - due for tfts, compliant with med    Review of Systems   Constitutional: Negative for chills, fatigue and fever.   Eyes: Negative for visual disturbance.   Respiratory: Negative for cough, chest tightness, shortness of breath and wheezing.    Cardiovascular: Negative for chest pain, palpitations and leg swelling.   Gastrointestinal: Negative for abdominal pain, constipation, diarrhea, nausea and vomiting.   Genitourinary: Negative for decreased urine volume, dysuria and frequency.   Musculoskeletal: Negative for arthralgias, back pain and myalgias.   Skin: Positive for wound. Negative for pallor and rash.   Neurological: Negative for dizziness, syncope, weakness, light-headedness, numbness and headaches.   Psychiatric/Behavioral: Positive for dysphoric mood. Negative for sleep disturbance. The patient is nervous/anxious.        Objective:      Physical Exam   Constitutional: She is oriented to person, place, and time. She appears well-developed and well-nourished. No distress.   HENT:   Head: Normocephalic and atraumatic.   Nose: Nose normal.   Mouth/Throat: Oropharynx is clear and moist.    Eyes: Conjunctivae are normal.   Neck: Normal range of motion. Neck supple. No JVD present. No thyromegaly present.   Cardiovascular: Normal rate, regular rhythm, normal heart sounds and intact distal pulses. Exam reveals no gallop and no friction rub.   No murmur heard.  Pulmonary/Chest: Effort normal and breath sounds normal. No stridor. No respiratory distress. She has no wheezes. She has no rales.   Abdominal: Soft. Bowel sounds are normal. She exhibits no distension. There is no tenderness.   Musculoskeletal: She exhibits no edema or tenderness.   Lymphadenopathy:     She has no cervical adenopathy.   Neurological: She is alert and oriented to person, place, and time.   Skin: Skin is warm and dry. No rash noted. She is not diaphoretic. No erythema. No pallor.   Extensive scarring of b/l axilla   Psychiatric: She has a normal mood and affect. Her behavior is normal.   Vitals reviewed.      Assessment:       1. Preventative health care    2. Hidradenitis suppurativa    3. Depression, unspecified depression type    4. Essential hypertension    5. Hypothyroidism, unspecified type    6. Elevated glucose        Plan:       Erica was seen today for follow-up.    Diagnoses and all orders for this visit:    Preventative health care  -     Lipid panel; Future  -     Lipid panel    Hidradenitis suppurativa  -     CBC auto differential; Future  -     CBC auto differential  -     Ambulatory Referral to Dermatology    Depression, unspecified depression type  -     escitalopram oxalate (LEXAPRO) 5 MG Tab; Take 1 tablet (5 mg total) by mouth once daily.    Essential hypertension    Hypothyroidism, unspecified type  -     TSH; Future  -     T4, free; Future  -     T3, free; Future  -     TSH  -     T4, free  -     T3, free    Elevated glucose  -     Hemoglobin A1c; Future  -     Comprehensive metabolic panel; Future  -     Hemoglobin A1c  -     Comprehensive metabolic panel

## 2019-06-14 LAB
ALBUMIN SERPL-MCNC: 4 G/DL (ref 3.6–5.1)
ALBUMIN/GLOB SERPL: 1 (CALC) (ref 1–2.5)
ALP SERPL-CCNC: 70 U/L (ref 33–115)
ALT SERPL-CCNC: 11 U/L (ref 6–29)
AST SERPL-CCNC: 11 U/L (ref 10–30)
BASOPHILS # BLD AUTO: 27 CELLS/UL (ref 0–200)
BASOPHILS NFR BLD AUTO: 0.2 %
BILIRUB SERPL-MCNC: 0.3 MG/DL (ref 0.2–1.2)
BUN SERPL-MCNC: 20 MG/DL (ref 7–25)
BUN/CREAT SERPL: ABNORMAL (CALC) (ref 6–22)
CALCIUM SERPL-MCNC: 9.3 MG/DL (ref 8.6–10.2)
CHLORIDE SERPL-SCNC: 107 MMOL/L (ref 98–110)
CHOLEST SERPL-MCNC: 156 MG/DL
CHOLEST/HDLC SERPL: 3.5 (CALC)
CO2 SERPL-SCNC: 20 MMOL/L (ref 20–32)
CREAT SERPL-MCNC: 0.84 MG/DL (ref 0.5–1.1)
EOSINOPHIL # BLD AUTO: 134 CELLS/UL (ref 15–500)
EOSINOPHIL NFR BLD AUTO: 1 %
ERYTHROCYTE [DISTWIDTH] IN BLOOD BY AUTOMATED COUNT: 13.8 % (ref 11–15)
GFRSERPLBLD MDRD-ARVRAT: 88 ML/MIN/1.73M2
GLOBULIN SER CALC-MCNC: 4 G/DL (CALC) (ref 1.9–3.7)
GLUCOSE SERPL-MCNC: 129 MG/DL (ref 65–99)
HBA1C MFR BLD: 5.9 % OF TOTAL HGB
HCT VFR BLD AUTO: 33.5 % (ref 35–45)
HDLC SERPL-MCNC: 44 MG/DL
HGB BLD-MCNC: 10.8 G/DL (ref 11.7–15.5)
LDLC SERPL CALC-MCNC: 85 MG/DL (CALC)
LYMPHOCYTES # BLD AUTO: 2868 CELLS/UL (ref 850–3900)
LYMPHOCYTES NFR BLD AUTO: 21.4 %
MCH RBC QN AUTO: 28.1 PG (ref 27–33)
MCHC RBC AUTO-ENTMCNC: 32.2 G/DL (ref 32–36)
MCV RBC AUTO: 87.2 FL (ref 80–100)
MONOCYTES # BLD AUTO: 764 CELLS/UL (ref 200–950)
MONOCYTES NFR BLD AUTO: 5.7 %
NEUTROPHILS # BLD AUTO: 9608 CELLS/UL (ref 1500–7800)
NEUTROPHILS NFR BLD AUTO: 71.7 %
NONHDLC SERPL-MCNC: 112 MG/DL (CALC)
PLATELET # BLD AUTO: 352 THOUSAND/UL (ref 140–400)
PMV BLD REES-ECKER: 10 FL (ref 7.5–12.5)
POTASSIUM SERPL-SCNC: 3.4 MMOL/L (ref 3.5–5.3)
PROT SERPL-MCNC: 8 G/DL (ref 6.1–8.1)
RBC # BLD AUTO: 3.84 MILLION/UL (ref 3.8–5.1)
SODIUM SERPL-SCNC: 138 MMOL/L (ref 135–146)
T3FREE SERPL-MCNC: 2.8 PG/ML (ref 2.3–4.2)
T4 FREE SERPL-MCNC: 1.2 NG/DL (ref 0.8–1.8)
TRIGL SERPL-MCNC: 172 MG/DL
TSH SERPL-ACNC: 2.5 MIU/L
WBC # BLD AUTO: 13.4 THOUSAND/UL (ref 3.8–10.8)

## 2019-06-25 ENCOUNTER — OFFICE VISIT (OUTPATIENT)
Dept: PRIMARY CARE CLINIC | Facility: CLINIC | Age: 39
End: 2019-06-25
Payer: MEDICAID

## 2019-06-25 VITALS
HEART RATE: 99 BPM | SYSTOLIC BLOOD PRESSURE: 118 MMHG | OXYGEN SATURATION: 98 % | DIASTOLIC BLOOD PRESSURE: 80 MMHG | WEIGHT: 229 LBS | BODY MASS INDEX: 44.72 KG/M2

## 2019-06-25 DIAGNOSIS — F41.1 GENERALIZED ANXIETY DISORDER: ICD-10-CM

## 2019-06-25 DIAGNOSIS — E78.5 HYPERLIPIDEMIA, UNSPECIFIED HYPERLIPIDEMIA TYPE: ICD-10-CM

## 2019-06-25 DIAGNOSIS — R73.03 PREDIABETES: ICD-10-CM

## 2019-06-25 DIAGNOSIS — L73.2 HIDRADENITIS SUPPURATIVA: Primary | ICD-10-CM

## 2019-06-25 DIAGNOSIS — F32.A DEPRESSION, UNSPECIFIED DEPRESSION TYPE: ICD-10-CM

## 2019-06-25 DIAGNOSIS — I10 ESSENTIAL HYPERTENSION: ICD-10-CM

## 2019-06-25 PROCEDURE — 99214 PR OFFICE/OUTPT VISIT, EST, LEVL IV, 30-39 MIN: ICD-10-PCS | Mod: S$GLB,,, | Performed by: FAMILY MEDICINE

## 2019-06-25 PROCEDURE — 99214 OFFICE O/P EST MOD 30 MIN: CPT | Mod: S$GLB,,, | Performed by: FAMILY MEDICINE

## 2019-06-25 RX ORDER — CLONAZEPAM 1 MG/1
1 TABLET ORAL 2 TIMES DAILY PRN
Qty: 60 TABLET | Refills: 0 | Status: SHIPPED | OUTPATIENT
Start: 2019-06-25 | End: 2019-07-29 | Stop reason: SDUPTHER

## 2019-06-25 NOTE — PROGRESS NOTES
Subjective:       Patient ID: Erica Law is a 39 y.o. female.    Chief Complaint: f/u HS    HPI     F/u of chronic medical conditions.    HS - has appt coming up July 5th w/ derm. She was given IV abx in ED in [place] during vacation. She was having a break out on face and also had a lesion of R groin that drained with warm compresses.     Anxiety/depression - pt reports that she had an anxiety attack 2 weeks ago. Was seen in urgent care and given rx for xanax. She has only taken 1 since that time because it made her sleepy. She does think the lexapro is helping mood but worries that it is preventing her from sleeping. She takes it at night.     Predm - on metformin, attempting dietary modifications.    HLD - on statin    HTN - well controlled on current    NEY - on iron supplements, hgb improved on last cbc'    Obesity - attempting dietary modifications and trying to be more active    Review of Systems   Constitutional: Negative for chills, fatigue and fever.   HENT: Negative for congestion, rhinorrhea, sore throat and trouble swallowing.    Eyes: Negative for visual disturbance.   Respiratory: Negative for cough, chest tightness, shortness of breath and wheezing.    Cardiovascular: Negative for chest pain, palpitations and leg swelling.   Gastrointestinal: Negative for abdominal pain, constipation, diarrhea, nausea and vomiting.   Genitourinary: Negative for decreased urine volume, dysuria and frequency.   Musculoskeletal: Positive for myalgias. Negative for arthralgias.   Skin: Negative for color change, pallor and rash.   Neurological: Negative for dizziness, syncope, weakness, light-headedness, numbness and headaches.   Hematological: Negative for adenopathy.   Psychiatric/Behavioral: Positive for dysphoric mood. Negative for sleep disturbance. The patient is nervous/anxious.        Objective:      Physical Exam   Constitutional: She is oriented to person, place, and time. She appears well-developed and  well-nourished. No distress.   HENT:   Head: Normocephalic and atraumatic.   Nose: Nose normal.   Mouth/Throat: Oropharynx is clear and moist.   Eyes: Conjunctivae are normal.   Neck: Normal range of motion. Neck supple. No JVD present. No thyromegaly present.   Cardiovascular: Normal rate, regular rhythm and normal heart sounds. Exam reveals no gallop and no friction rub.   No murmur heard.  Pulmonary/Chest: Effort normal and breath sounds normal. No stridor. No respiratory distress. She has no wheezes.   Abdominal: Soft. Bowel sounds are normal. There is no tenderness.   Musculoskeletal: She exhibits no edema.   Neurological: She is alert and oriented to person, place, and time.   Skin: Skin is warm and dry. No rash noted. She is not diaphoretic. No erythema. No pallor.   Chronic keloid scarring b/l axilla. No wound, erythema, or tenderness   Psychiatric: She has a normal mood and affect. Her behavior is normal.   Vitals reviewed.      Assessment:       1. Hidradenitis suppurativa    2. Generalized anxiety disorder    3. Depression, unspecified depression type    4. Prediabetes    5. Essential hypertension    6. Hyperlipidemia, unspecified hyperlipidemia type    7. BMI 40.0-44.9, adult        Plan:       Eirca was seen today for follow-up.    Diagnoses and all orders for this visit:    Hidradenitis suppurativa  Comments:  will f/u derm note, considering humira     Generalized anxiety disorder  Comments:  counseled on use of clonazepam, planning to wean off or take strictly as needed. titrating lexapro  Orders:  -     clonazePAM (KLONOPIN) 1 MG tablet; Take 1 tablet (1 mg total) by mouth 2 (two) times daily as needed for Anxiety.    Depression, unspecified depression type  Comments:  start taking lexapro in am, will increase to 10 at next visit    Prediabetes  Comments:  continue metformin    Essential hypertension  Comments:  well controlled on current    Hyperlipidemia, unspecified hyperlipidemia  type  Comments:  continue statin    BMI 40.0-44.9, adult  Comments:  diet/exercise

## 2019-07-15 ENCOUNTER — TELEPHONE (OUTPATIENT)
Dept: PRIMARY CARE CLINIC | Facility: CLINIC | Age: 39
End: 2019-07-15

## 2019-07-16 RX ORDER — IBUPROFEN 200 MG
1 TABLET ORAL DAILY
Qty: 28 PATCH | Refills: 1 | COMMUNITY
Start: 2019-07-16 | End: 2019-07-23 | Stop reason: SDUPTHER

## 2019-07-16 RX ORDER — NYSTATIN 100000 [USP'U]/ML
5 SUSPENSION ORAL 4 TIMES DAILY
Qty: 200 ML | Refills: 0 | Status: SHIPPED | OUTPATIENT
Start: 2019-07-16 | End: 2019-07-26

## 2019-07-17 ENCOUNTER — TELEPHONE (OUTPATIENT)
Dept: PRIMARY CARE CLINIC | Facility: CLINIC | Age: 39
End: 2019-07-17

## 2019-07-18 RX ORDER — DIPHENHYDRAMINE HCL 25 MG
4 CAPSULE ORAL
Qty: 40 EACH | Refills: 0 | COMMUNITY
Start: 2019-07-18 | End: 2019-07-23 | Stop reason: SDUPTHER

## 2019-07-23 ENCOUNTER — OFFICE VISIT (OUTPATIENT)
Dept: PRIMARY CARE CLINIC | Facility: CLINIC | Age: 39
End: 2019-07-23
Payer: MEDICAID

## 2019-07-23 ENCOUNTER — TELEPHONE (OUTPATIENT)
Dept: PRIMARY CARE CLINIC | Facility: CLINIC | Age: 39
End: 2019-07-23

## 2019-07-23 VITALS
SYSTOLIC BLOOD PRESSURE: 120 MMHG | DIASTOLIC BLOOD PRESSURE: 80 MMHG | HEART RATE: 101 BPM | WEIGHT: 227 LBS | OXYGEN SATURATION: 99 % | BODY MASS INDEX: 44.33 KG/M2

## 2019-07-23 DIAGNOSIS — L73.2 HIDRADENITIS SUPPURATIVA: ICD-10-CM

## 2019-07-23 DIAGNOSIS — E78.5 HYPERLIPIDEMIA, UNSPECIFIED HYPERLIPIDEMIA TYPE: ICD-10-CM

## 2019-07-23 DIAGNOSIS — Z72.0 TOBACCO ABUSE: ICD-10-CM

## 2019-07-23 DIAGNOSIS — F41.1 GENERALIZED ANXIETY DISORDER: ICD-10-CM

## 2019-07-23 DIAGNOSIS — N61.1 BREAST ABSCESS: Primary | ICD-10-CM

## 2019-07-23 DIAGNOSIS — Z23 NEED FOR HEPATITIS B VACCINATION: ICD-10-CM

## 2019-07-23 DIAGNOSIS — I10 ESSENTIAL HYPERTENSION: ICD-10-CM

## 2019-07-23 DIAGNOSIS — R73.03 PREDIABETES: ICD-10-CM

## 2019-07-23 PROCEDURE — 99214 PR OFFICE/OUTPT VISIT, EST, LEVL IV, 30-39 MIN: ICD-10-PCS | Mod: S$GLB,,, | Performed by: FAMILY MEDICINE

## 2019-07-23 PROCEDURE — 99214 OFFICE O/P EST MOD 30 MIN: CPT | Mod: S$GLB,,, | Performed by: FAMILY MEDICINE

## 2019-07-23 RX ORDER — DIPHENHYDRAMINE HCL 25 MG
4 CAPSULE ORAL
Qty: 40 EACH | Refills: 0 | Status: SHIPPED | OUTPATIENT
Start: 2019-07-23 | End: 2019-07-26 | Stop reason: SDUPTHER

## 2019-07-23 RX ORDER — TRAMADOL HYDROCHLORIDE 50 MG/1
50 TABLET ORAL EVERY 6 HOURS
Qty: 30 TABLET | Refills: 0 | Status: SHIPPED | OUTPATIENT
Start: 2019-07-23 | End: 2019-08-23 | Stop reason: SDUPTHER

## 2019-07-23 RX ORDER — IBUPROFEN 200 MG
1 TABLET ORAL DAILY
Qty: 28 PATCH | Refills: 1 | Status: SHIPPED | OUTPATIENT
Start: 2019-07-23 | End: 2019-08-23

## 2019-07-23 RX ORDER — NICOTINE 7MG/24HR
1 PATCH, TRANSDERMAL 24 HOURS TRANSDERMAL DAILY
Qty: 28 PATCH | Refills: 0 | Status: SHIPPED | OUTPATIENT
Start: 2019-07-23 | End: 2019-08-16 | Stop reason: SDUPTHER

## 2019-07-23 NOTE — PROGRESS NOTES
Subjective:       Patient ID: Erica Law is a 39 y.o. female.    Chief Complaint: Follow-up    HPI     F/u of chronic medical conditions.    Pt seeing derm, being evaluated for humira for HS. Needs Hep B vaccine. Currently on doxy. Currently has wounds and drainage of L axilla, no f/c. Has also noted L breast mass that is tender, thinks this has been there for about 4 days.     Anxiety - currently well controlled on lexapro and klonopin. Pt still having depressed mood, planning to increase lexapro    HTN - well controlled    HLD - on statin    Requests jose patches and gum for aid with smoking cessation.     She was seen in ED recently and given course clinda for HS. Not finished with these    Review of Systems   Constitutional: Negative for chills, diaphoresis, fatigue, fever and unexpected weight change.   HENT: Negative for congestion, hearing loss, rhinorrhea and sore throat.    Eyes: Negative for pain and visual disturbance.   Respiratory: Negative for cough, chest tightness, shortness of breath and wheezing.    Cardiovascular: Negative for chest pain, palpitations and leg swelling.   Gastrointestinal: Negative for abdominal pain, constipation, diarrhea, nausea and vomiting.   Genitourinary: Negative for decreased urine volume, dysuria, frequency and hematuria.   Musculoskeletal: Negative for back pain and myalgias.   Skin: Positive for color change and wound. Negative for pallor and rash.   Neurological: Negative for dizziness, syncope, weakness, light-headedness and headaches.   Hematological: Negative for adenopathy.   Psychiatric/Behavioral: Positive for dysphoric mood. Negative for sleep disturbance. The patient is not nervous/anxious.        Objective:      Physical Exam     Constitutional: She is oriented to person, place, and time. She appears well-developed and well-nourished. No distress.   HENT:   Head: Normocephalic and atraumatic.   Nose: Nose normal.   Mouth/Throat: Oropharynx is clear and  moist.   Eyes: Conjunctivae are normal.   Neck: Normal range of motion. Neck supple. No JVD present. No thyromegaly present.   Cardiovascular: Normal rate, regular rhythm and normal heart sounds. Exam reveals no gallop and no friction rub.   No murmur heard.  Pulmonary/Chest: Effort normal and breath sounds normal. No stridor. No respiratory distress. She has no wheezes.   Abdominal: Soft. Bowel sounds are normal. There is no tenderness.   Musculoskeletal: She exhibits no edema.   Neurological: She is alert and oriented to person, place, and time.   Skin: Skin is warm and dry. No rash noted. She is not diaphoretic. No erythema. No pallor.   Palpable L breast mass with overlying erythema  Chronic keloid scarring b/l axilla. No wound, erythema, or tenderness   Psychiatric: She has a normal mood and affect. Her behavior is normal.   Vitals reviewed.  Assessment:       1. Breast abscess    2. Essential hypertension    3. Hyperlipidemia, unspecified hyperlipidemia type    4. Prediabetes    5. Generalized anxiety disorder    6. Hidradenitis suppurativa    7. Tobacco abuse    8. Need for hepatitis B vaccination        Plan:       Erica was seen today for follow-up.    Diagnoses and all orders for this visit:    Breast abscess  Comments:  currently on clinda, get breast u/s. may need surgical drainage  Orders:  -     US Breast Left Complete; Future  -     US Breast Left Complete  -     Ambulatory referral to General Surgery    Essential hypertension  Comments:  controlled    Hyperlipidemia, unspecified hyperlipidemia type  Comments:  contine statin    Prediabetes  Comments:  continue metformin    Generalized anxiety disorder  Comments:  continue current    Hidradenitis suppurativa  Comments:  following with derm, waiting to start humira  Orders:  -     traMADol (ULTRAM) 50 mg tablet; Take 1 tablet (50 mg total) by mouth every 6 (six) hours.    Tobacco abuse  -     nicotine (NICODERM CQ) 21 mg/24 hr; Place 1 patch onto the  skin once daily.  -     nicotine (NICODERM CQ) 7 mg/24 hr; Place 1 patch onto the skin once daily.  -     nicotine polacrilex (NICORETTE) 4 MG Gum; Take 1 each (4 mg total) by mouth as needed.    Need for hepatitis B vaccination  -     hepatitis B virus, PF, (ENGERIX-B) 10 mcg/0.5 mL Syrg; Inject 0.5 mLs into the muscle once. for 1 dose

## 2019-07-23 NOTE — TELEPHONE ENCOUNTER
You have the US of her breast STAT but this has to be done online and the turn around time would be 24hrs.

## 2019-07-24 RX ORDER — ATORVASTATIN CALCIUM 40 MG/1
40 TABLET, FILM COATED ORAL DAILY
Qty: 90 TABLET | Refills: 1 | Status: SHIPPED | OUTPATIENT
Start: 2019-07-24 | End: 2020-01-08 | Stop reason: SDUPTHER

## 2019-07-25 ENCOUNTER — TELEPHONE (OUTPATIENT)
Dept: PRIMARY CARE CLINIC | Facility: CLINIC | Age: 39
End: 2019-07-25

## 2019-07-25 NOTE — TELEPHONE ENCOUNTER
[facility] called to advise the nicotine 4 mg gum comes in a box of 100 and needs a new script for it to be written that way.     Nicotine Lozenges for 4mg for a pk of 72     Received the script for HEP B injection. You sent a pedi dose so they are asking for the corrected adult dose.

## 2019-07-26 DIAGNOSIS — Z72.0 TOBACCO ABUSE: ICD-10-CM

## 2019-07-26 RX ORDER — ASPIRIN/CALCIUM CARB/MAGNESIUM 325 MG
4 TABLET ORAL
Qty: 72 LOZENGE | Refills: 0 | Status: SHIPPED | OUTPATIENT
Start: 2019-07-26 | End: 2019-08-30 | Stop reason: SDUPTHER

## 2019-07-26 RX ORDER — DIPHENHYDRAMINE HCL 25 MG
4 CAPSULE ORAL
Qty: 100 EACH | Refills: 0 | Status: SHIPPED | OUTPATIENT
Start: 2019-07-26 | End: 2019-08-09 | Stop reason: SDUPTHER

## 2019-07-29 DIAGNOSIS — F41.1 GENERALIZED ANXIETY DISORDER: Primary | ICD-10-CM

## 2019-07-30 ENCOUNTER — OFFICE VISIT (OUTPATIENT)
Dept: SURGERY | Facility: CLINIC | Age: 39
End: 2019-07-30
Payer: MEDICAID

## 2019-07-30 VITALS — BODY MASS INDEX: 43.78 KG/M2 | WEIGHT: 223 LBS | HEIGHT: 60 IN

## 2019-07-30 DIAGNOSIS — N61.0 MASTITIS IN FEMALE: Primary | ICD-10-CM

## 2019-07-30 PROCEDURE — 99203 OFFICE O/P NEW LOW 30 MIN: CPT | Mod: S$GLB,,, | Performed by: SURGERY

## 2019-07-30 PROCEDURE — 99203 PR OFFICE/OUTPT VISIT, NEW, LEVL III, 30-44 MIN: ICD-10-PCS | Mod: S$GLB,,, | Performed by: SURGERY

## 2019-07-30 RX ORDER — CLINDAMYCIN HYDROCHLORIDE 300 MG/1
300 CAPSULE ORAL EVERY 6 HOURS
COMMUNITY
End: 2019-08-23

## 2019-07-30 RX ORDER — CLONAZEPAM 1 MG/1
1 TABLET ORAL 2 TIMES DAILY PRN
Qty: 60 TABLET | Refills: 0 | Status: SHIPPED | OUTPATIENT
Start: 2019-07-30 | End: 2019-08-23 | Stop reason: SDUPTHER

## 2019-07-30 NOTE — PROGRESS NOTES
Subjective:       Patient ID: Erica Law is a 39 y.o. female.    Chief Complaint:  Left breast mass, possible abscess    Ms. Law is referred by Dr. Jill Palacios for the above complaint.  She has long history of hidradenitis involving both axilla, groins, inferior aspect of the breast, and other areas as well. She has required numerous incision drainage procedures over the years.  Recently she developed a hard mass in the left breast with overlying skin erythema, located in the upper breast 11:00 position. This was suspected to be infectious and she was started on antibiotics, specifically clindamycin.  Recently doxycycline has been added as well and she says she has another 2 weeks or so of antibiotics to complete her course. On ultrasound of the left breast was done this morning and only reported a cystic area measuring 1.5 x 0.4 x 0.5 cm consistent with a small slightly complicated cyst.  She says that area feels a little better on the antibiotics.  No fever or chills.  No prior breast infections other than hidradenitis involving the bilateral inframammary folds.  Surgical opinion is requested.    Past Medical History:   Diagnosis Date    Anxiety     Diabetes mellitus     Hydradenitis     Hypertension     Thyroid disease      Past Surgical History:   Procedure Laterality Date     SECTION       Family History   Problem Relation Age of Onset    Diabetes Mellitus Mother     Cancer Maternal Grandmother     Cancer Maternal Grandfather      Social History     Socioeconomic History    Marital status: Single     Spouse name: Not on file    Number of children: 1    Years of education: Not on file    Highest education level: Not on file   Occupational History    Not on file   Social Needs    Financial resource strain: Not on file    Food insecurity:     Worry: Not on file     Inability: Not on file    Transportation needs:     Medical: Not on file     Non-medical: Not on file   Tobacco  Use    Smoking status: Current Every Day Smoker     Types: Vaping with nicotine    Smokeless tobacco: Never Used    Tobacco comment: pt on medications to stop smoking    Substance and Sexual Activity    Alcohol use: Not Currently    Drug use: Not on file    Sexual activity: Not on file   Lifestyle    Physical activity:     Days per week: Not on file     Minutes per session: Not on file    Stress: Not on file   Relationships    Social connections:     Talks on phone: Not on file     Gets together: Not on file     Attends [employer] service: Not on file     Active member of club or organization: Not on file     Attends meetings of clubs or organizations: Not on file     Relationship status: Not on file   Other Topics Concern    Not on file   Social History Narrative    Not on file       Current Outpatient Medications   Medication Sig Dispense Refill    atorvastatin (LIPITOR) 40 MG tablet Take 1 tablet (40 mg total) by mouth once daily. 90 tablet 1    clindamycin (CLEOCIN) 300 MG capsule Take 300 mg by mouth every 6 (six) hours.      clonazePAM (KLONOPIN) 1 MG tablet Take 1 tablet (1 mg total) by mouth 2 (two) times daily as needed for Anxiety. 60 tablet 0    escitalopram oxalate (LEXAPRO) 5 MG Tab Take 1 tablet (5 mg total) by mouth once daily. 30 tablet 1    ferrous sulfate 324 mg (65 mg iron) TbEC Take 324 mg by mouth 2 (two) times daily.  1    gabapentin (NEURONTIN) 300 MG capsule gabapentin 300 mg capsule      hydroCHLOROthiazide (HYDRODIURIL) 25 MG tablet hydrochlorothiazide 25 mg tablet   TAKE 1 TABLET BY MOUTH EVERY DAY       mg tablet TAKE 1 TABLET BY MOUTH 3 TIMES A DAY AS NEEDED FOR PAIN 270 tablet 0    iron-vit c-vit b12-folic acid (IRON-C PLUS) tablet Iron 100 Plus 100 mg-250 mg-25 mcg-1 mg tablet   Take 1 tablet twice a day by oral route.      levothyroxine (SYNTHROID) 50 MCG tablet       medroxyPROGESTERone (DEPO-PROVERA) 150 mg/mL injection       metFORMIN (GLUCOPHAGE) 500  MG tablet       nicotine (NICODERM CQ) 21 mg/24 hr Place 1 patch onto the skin once daily. 28 patch 1    nicotine (NICODERM CQ) 7 mg/24 hr Place 1 patch onto the skin once daily. 28 patch 0    nicotine polacrilex 4 MG Lozg Take 1 lozenge (4 mg total) by mouth every 4 to 6 hours as needed. 72 lozenge 0    traMADol (ULTRAM) 50 mg tablet Take 1 tablet (50 mg total) by mouth every 6 (six) hours. 30 tablet 0    diclofenac sodium (VOLTAREN) 1 % Gel Apply 2 grams topically 4 (four) times daily. 1 Tube 5    mupirocin (BACTROBAN) 2 % ointment Apply topically 3 (three) times daily. 1 Tube 5    nicotine polacrilex (NICORETTE) 4 MG Gum Take 1 each (4 mg total) by mouth every 4 to 6 hours as needed. 100 each 0     No current facility-administered medications for this visit.      Review of patient's allergies indicates:  No Known Allergies    Review of Systems   Respiratory: Negative.    Cardiovascular: Negative.    Gastrointestinal: Negative.    Musculoskeletal: Negative.    Neurological: Negative.    Hematological: Negative.    Psychiatric/Behavioral: Negative.        Objective:      Vitals:    07/30/19 1032   Weight: 101.2 kg (223 lb)   Height: 5' (1.524 m)     Physical Exam   Constitutional: She appears well-developed and well-nourished.   Neck: Normal range of motion. Neck supple.   Cardiovascular: Normal rate, regular rhythm and normal heart sounds.   Pulmonary/Chest: Effort normal and breath sounds normal. Right breast exhibits no inverted nipple, no mass, no nipple discharge, no skin change and no tenderness. Left breast exhibits mass, skin change and tenderness. Left breast exhibits no inverted nipple and no nipple discharge.       Abdominal: Soft. Bowel sounds are normal. She exhibits no mass. No hernia.   Musculoskeletal: Normal range of motion. She exhibits no edema or deformity.          Diagnostics Review:  Ultrasound images and report from [facility] reviewed     Assessment:       Left breast mass,  most likely inflammatory in nature  Plan:       Recommend she complete her course of oral antibiotics, then she will be reassessed in the office a week later.  If this area is not improving I can reexamine with ultrasound at that time to see if there is anything to aspirate.  If the mass effect persists then core needle biopsy may be indicated as well.

## 2019-07-30 NOTE — LETTER
July 30, 2019      Jill Palacios MD  [address]  [facility] [address]           [place] - General Surgery  [address]  [facility] [address]  Phone: [phone]  Fax: [phone]          Patient: Erica Law   MR Number: [id number]   YOB: 1980   Date of Visit: 7/30/2019       Dear Dr. Jill Palacios:    Thank you for referring Erica Law to me for evaluation. Attached you will find relevant portions of my assessment and plan of care.    If you have questions, please do not hesitate to call me. I look forward to following Erica Law along with you.    Sincerely,    Eliseo Padilla MD    Enclosure  CC:  No Recipients    If you would like to receive this communication electronically, please contact [email or web address] or [phone] to request more information on [employer] Link access.    For providers and/or their staff who would like to refer a patient to [facility], please contact us through our one-stop-shop provider referral line, [facility] , at [phone].    If you feel you have received this communication in error or would no longer like to receive these types of communications, please e-mail [email or web address]

## 2019-07-31 DIAGNOSIS — F41.1 GENERALIZED ANXIETY DISORDER: ICD-10-CM

## 2019-07-31 RX ORDER — CLONAZEPAM 1 MG/1
TABLET ORAL
Qty: 60 TABLET | Refills: 0 | OUTPATIENT
Start: 2019-07-31

## 2019-08-09 DIAGNOSIS — Z72.0 TOBACCO ABUSE: ICD-10-CM

## 2019-08-10 DIAGNOSIS — F32.A DEPRESSION, UNSPECIFIED DEPRESSION TYPE: ICD-10-CM

## 2019-08-12 RX ORDER — ESCITALOPRAM OXALATE 5 MG/1
TABLET ORAL
Qty: 30 TABLET | Refills: 1 | Status: SHIPPED | OUTPATIENT
Start: 2019-08-12 | End: 2019-08-23

## 2019-08-12 RX ORDER — DIPHENHYDRAMINE HCL 25 MG
CAPSULE ORAL
Qty: 100 EACH | Refills: 0 | Status: SHIPPED | OUTPATIENT
Start: 2019-08-12 | End: 2019-10-24 | Stop reason: SDUPTHER

## 2019-08-15 DIAGNOSIS — Z72.0 TOBACCO ABUSE: ICD-10-CM

## 2019-08-15 RX ORDER — HEPATITIS B VACCINE (RECOMBINANT) 20 UG/ML
INJECTION, SUSPENSION INTRAMUSCULAR
Qty: 1 SYRINGE | Refills: 0 | Status: SHIPPED | OUTPATIENT
Start: 2019-08-15 | End: 2021-09-23

## 2019-08-17 DIAGNOSIS — Z72.0 TOBACCO ABUSE: ICD-10-CM

## 2019-08-19 DIAGNOSIS — Z72.0 TOBACCO ABUSE: ICD-10-CM

## 2019-08-19 RX ORDER — NICOTINE 7MG/24HR
1 PATCH, TRANSDERMAL 24 HOURS TRANSDERMAL DAILY
Qty: 28 PATCH | Refills: 2 | Status: SHIPPED | OUTPATIENT
Start: 2019-08-19 | End: 2019-08-19 | Stop reason: SDUPTHER

## 2019-08-19 RX ORDER — NICOTINE 7MG/24HR
PATCH, TRANSDERMAL 24 HOURS TRANSDERMAL
Qty: 28 PATCH | Refills: 0 | Status: SHIPPED | OUTPATIENT
Start: 2019-08-19 | End: 2019-08-23

## 2019-08-19 RX ORDER — NICOTINE 7MG/24HR
1 PATCH, TRANSDERMAL 24 HOURS TRANSDERMAL DAILY
Qty: 28 PATCH | Refills: 2 | Status: SHIPPED | OUTPATIENT
Start: 2019-08-19 | End: 2019-12-11 | Stop reason: SDUPTHER

## 2019-08-19 RX ORDER — IBUPROFEN 800 MG/1
TABLET ORAL
Qty: 90 TABLET | Refills: 2 | Status: SHIPPED | OUTPATIENT
Start: 2019-08-19 | End: 2019-11-09 | Stop reason: SDUPTHER

## 2019-08-23 ENCOUNTER — OFFICE VISIT (OUTPATIENT)
Dept: FAMILY MEDICINE | Facility: CLINIC | Age: 39
End: 2019-08-23
Payer: MEDICAID

## 2019-08-23 VITALS
OXYGEN SATURATION: 99 % | WEIGHT: 232 LBS | HEART RATE: 86 BPM | SYSTOLIC BLOOD PRESSURE: 130 MMHG | DIASTOLIC BLOOD PRESSURE: 82 MMHG | BODY MASS INDEX: 45.31 KG/M2

## 2019-08-23 DIAGNOSIS — Z30.09 STERILIZATION CONSULT: ICD-10-CM

## 2019-08-23 DIAGNOSIS — I10 HYPERTENSION, UNSPECIFIED TYPE: Primary | ICD-10-CM

## 2019-08-23 DIAGNOSIS — E03.9 HYPOTHYROIDISM, UNSPECIFIED TYPE: ICD-10-CM

## 2019-08-23 DIAGNOSIS — F32.A DEPRESSION, UNSPECIFIED DEPRESSION TYPE: ICD-10-CM

## 2019-08-23 DIAGNOSIS — F41.1 GENERALIZED ANXIETY DISORDER: ICD-10-CM

## 2019-08-23 DIAGNOSIS — Z72.0 TOBACCO ABUSE: ICD-10-CM

## 2019-08-23 DIAGNOSIS — L73.2 HIDRADENITIS SUPPURATIVA: ICD-10-CM

## 2019-08-23 PROCEDURE — 99215 PR OFFICE/OUTPT VISIT, EST, LEVL V, 40-54 MIN: ICD-10-PCS | Mod: S$GLB,,, | Performed by: FAMILY MEDICINE

## 2019-08-23 PROCEDURE — 99215 OFFICE O/P EST HI 40 MIN: CPT | Mod: S$GLB,,, | Performed by: FAMILY MEDICINE

## 2019-08-23 RX ORDER — CLONAZEPAM 1 MG/1
1 TABLET ORAL 2 TIMES DAILY PRN
Qty: 60 TABLET | Refills: 0 | Status: SHIPPED | OUTPATIENT
Start: 2019-08-23 | End: 2019-09-23 | Stop reason: SDUPTHER

## 2019-08-23 RX ORDER — TRAMADOL HYDROCHLORIDE 50 MG/1
50 TABLET ORAL EVERY 6 HOURS
Qty: 30 TABLET | Refills: 0 | Status: SHIPPED | OUTPATIENT
Start: 2019-08-23 | End: 2019-09-23 | Stop reason: SDUPTHER

## 2019-08-23 RX ORDER — ESCITALOPRAM OXALATE 10 MG/1
10 TABLET ORAL DAILY
Qty: 30 TABLET | Refills: 3 | Status: SHIPPED | OUTPATIENT
Start: 2019-08-23 | End: 2019-09-23 | Stop reason: SDUPTHER

## 2019-08-23 RX ORDER — ADALIMUMAB 80MG/0.8ML
KIT SUBCUTANEOUS
Refills: 0 | COMMUNITY
Start: 2019-08-12 | End: 2021-06-29

## 2019-08-23 NOTE — PROGRESS NOTES
Subjective:       Patient ID: Erica Law is a 39 y.o. female.    Chief Complaint:     HPI     F/u of chronic medical conditions.    HS - started on humira, doing well. Is aware that it will take 3 months to work. Has appt nirmal with derm    HTN - well controlled on current     Hypothyroidism - compliant with med. Last tfts wnl    Anxiety/depression - anxiety controlled on clonazepam prn. She does report depressed mood, unsure if this is related to her daughter moving out for college    Smoking - trying to cut back using nicotine replacement    Has been on depo for years for menorrhagia - would like to see gyn for possible hys eval    Review of Systems   Constitutional: Negative for chills, diaphoresis, fatigue, fever and unexpected weight change.   HENT: Negative for congestion, rhinorrhea and sore throat.    Eyes: Negative for visual disturbance.   Respiratory: Negative for cough, chest tightness, shortness of breath and wheezing.    Cardiovascular: Negative for chest pain, palpitations and leg swelling.   Gastrointestinal: Negative for abdominal pain, constipation, diarrhea, nausea and vomiting.   Genitourinary: Negative for decreased urine volume, dysuria, frequency and hematuria.   Musculoskeletal: Negative for arthralgias, back pain, myalgias and neck pain.   Skin: Negative for color change, pallor and rash.   Neurological: Negative for dizziness, syncope, weakness, light-headedness and headaches.   Hematological: Negative for adenopathy.   Psychiatric/Behavioral: Negative for dysphoric mood and sleep disturbance. The patient is not nervous/anxious.        Objective:      Physical Exam   Constitutional: She is oriented to person, place, and time. She appears well-developed and well-nourished. No distress.   HENT:   Head: Normocephalic and atraumatic.   Nose: Nose normal.   Mouth/Throat: Oropharynx is clear and moist.   Eyes: Conjunctivae are normal.   Neck: Normal range of motion. Neck supple. No JVD  present. No thyromegaly present.   Cardiovascular: Normal rate, regular rhythm, normal heart sounds and intact distal pulses. Exam reveals no gallop and no friction rub.   No murmur heard.  Pulmonary/Chest: Effort normal and breath sounds normal. No stridor. No respiratory distress. She has no wheezes. She has no rales.   Abdominal: Soft. Bowel sounds are normal. She exhibits no distension. There is no tenderness.   Musculoskeletal: She exhibits no edema or tenderness.   Lymphadenopathy:     She has no cervical adenopathy.   Neurological: She is alert and oriented to person, place, and time.   Skin: Skin is warm and dry. No rash noted. She is not diaphoretic. No erythema. No pallor.   Psychiatric: She has a normal mood and affect. Her behavior is normal.   Vitals reviewed.      Assessment:       1. Hypertension, unspecified type    2. Hypothyroidism, unspecified type    3. Hidradenitis suppurativa    4. Generalized anxiety disorder    5. Depression, unspecified depression type    6. Tobacco abuse    7. Sterilization consult    8. BMI 45.0-49.9, adult        Plan:       Diagnoses and all orders for this visit:    Hypertension, unspecified type  Comments:  continue current    Hypothyroidism, unspecified type  Comments:  continue current med    Hidradenitis suppurativa  -     traMADol (ULTRAM) 50 mg tablet; Take 1 tablet (50 mg total) by mouth every 6 (six) hours.    Generalized anxiety disorder  -     clonazePAM (KLONOPIN) 1 MG tablet; Take 1 tablet (1 mg total) by mouth 2 (two) times daily as needed for Anxiety.    Depression, unspecified depression type  -     escitalopram oxalate (LEXAPRO) 10 MG tablet; Take 1 tablet (10 mg total) by mouth once daily.    Tobacco abuse  Comments:  continue nicotine replacement and counseled on smoking cessation    Sterilization consult  -     Ambulatory referral to Obstetrics / Gynecology    BMI 45.0-49.9, adult  Comments:  counseled on weight loss goal/plan

## 2019-08-30 DIAGNOSIS — Z72.0 TOBACCO ABUSE: Primary | ICD-10-CM

## 2019-08-30 RX ORDER — ASPIRIN/CALCIUM CARB/MAGNESIUM 325 MG
4 TABLET ORAL
Qty: 72 LOZENGE | Refills: 0 | Status: SHIPPED | OUTPATIENT
Start: 2019-08-30 | End: 2019-10-08 | Stop reason: SDUPTHER

## 2019-09-23 ENCOUNTER — OFFICE VISIT (OUTPATIENT)
Dept: OBSTETRICS AND GYNECOLOGY | Facility: CLINIC | Age: 39
End: 2019-09-23
Payer: MEDICAID

## 2019-09-23 ENCOUNTER — OFFICE VISIT (OUTPATIENT)
Dept: FAMILY MEDICINE | Facility: CLINIC | Age: 39
End: 2019-09-23
Payer: MEDICAID

## 2019-09-23 VITALS
HEIGHT: 60 IN | DIASTOLIC BLOOD PRESSURE: 96 MMHG | SYSTOLIC BLOOD PRESSURE: 145 MMHG | WEIGHT: 234.81 LBS | BODY MASS INDEX: 46.1 KG/M2 | HEART RATE: 92 BPM

## 2019-09-23 VITALS
HEART RATE: 113 BPM | OXYGEN SATURATION: 99 % | DIASTOLIC BLOOD PRESSURE: 82 MMHG | WEIGHT: 233 LBS | SYSTOLIC BLOOD PRESSURE: 130 MMHG | BODY MASS INDEX: 45.5 KG/M2

## 2019-09-23 DIAGNOSIS — R73.03 PREDIABETES: ICD-10-CM

## 2019-09-23 DIAGNOSIS — E83.42 HYPOMAGNESEMIA: ICD-10-CM

## 2019-09-23 DIAGNOSIS — D50.0 IRON DEFICIENCY ANEMIA DUE TO CHRONIC BLOOD LOSS: ICD-10-CM

## 2019-09-23 DIAGNOSIS — Z30.9 ENCOUNTER FOR CONTRACEPTIVE MANAGEMENT, UNSPECIFIED TYPE: Primary | ICD-10-CM

## 2019-09-23 DIAGNOSIS — F32.A DEPRESSION, UNSPECIFIED DEPRESSION TYPE: Primary | ICD-10-CM

## 2019-09-23 DIAGNOSIS — F41.1 GENERALIZED ANXIETY DISORDER: ICD-10-CM

## 2019-09-23 DIAGNOSIS — L73.2 HIDRADENITIS SUPPURATIVA: ICD-10-CM

## 2019-09-23 DIAGNOSIS — E83.39 HYPOPHOSPHATEMIA: ICD-10-CM

## 2019-09-23 PROCEDURE — 99204 OFFICE O/P NEW MOD 45 MIN: CPT | Mod: S$GLB,,, | Performed by: OBSTETRICS & GYNECOLOGY

## 2019-09-23 PROCEDURE — 99214 PR OFFICE/OUTPT VISIT, EST, LEVL IV, 30-39 MIN: ICD-10-PCS | Mod: S$GLB,,, | Performed by: FAMILY MEDICINE

## 2019-09-23 PROCEDURE — 99204 PR OFFICE/OUTPT VISIT, NEW, LEVL IV, 45-59 MIN: ICD-10-PCS | Mod: S$GLB,,, | Performed by: OBSTETRICS & GYNECOLOGY

## 2019-09-23 PROCEDURE — 99214 OFFICE O/P EST MOD 30 MIN: CPT | Mod: S$GLB,,, | Performed by: FAMILY MEDICINE

## 2019-09-23 RX ORDER — ESCITALOPRAM OXALATE 20 MG/1
20 TABLET ORAL DAILY
Qty: 30 TABLET | Refills: 3 | Status: SHIPPED | OUTPATIENT
Start: 2019-09-23 | End: 2020-01-17 | Stop reason: SDUPTHER

## 2019-09-23 RX ORDER — CLONAZEPAM 1 MG/1
1 TABLET ORAL 2 TIMES DAILY PRN
Qty: 60 TABLET | Refills: 0 | Status: SHIPPED | OUTPATIENT
Start: 2019-09-23 | End: 2019-10-21 | Stop reason: SDUPTHER

## 2019-09-23 RX ORDER — TRAMADOL HYDROCHLORIDE 50 MG/1
50 TABLET ORAL EVERY 8 HOURS PRN
Qty: 60 TABLET | Refills: 0 | Status: SHIPPED | OUTPATIENT
Start: 2019-09-23 | End: 2019-10-21 | Stop reason: SDUPTHER

## 2019-09-23 NOTE — LETTER
September 23, 2019      Jill Palacios MD  [address]  [place] [address]           [place] [facility]  [address]  [place] [address]  Phone: [phone]  Fax: [phone]          Patient: Erica Law   MR Number: [id number]   YOB: 1980   Date of Visit: 9/23/2019       Dear Dr. Jill Palacios:    Thank you for referring Erica Law to me for evaluation. Attached you will find relevant portions of my assessment and plan of care.    If you have questions, please do not hesitate to call me. I look forward to following Erica Law along with you.    Sincerely,    Ellis Palacios MD    Enclosure  CC:  No Recipients    If you would like to receive this communication electronically, please contact [email or web address] or [phone] to request more information on [employer] Link access.    For providers and/or their staff who would like to refer a patient to [facility], please contact us through our one-stop-shop provider referral line, [facility], at [phone].    If you feel you have received this communication in error or would no longer like to receive these types of communications, please e-mail [email or web address]

## 2019-09-23 NOTE — PROGRESS NOTES
Subjective:       Patient ID: Erica Law is a 39 y.o. female.    Chief Complaint:  Consult      History of Present Illness  Has multiple medical problems. She came in asking for hysterectomy.  She was put on Depo-Provera 7 years ago because of her hidradenitis.  Talked about this she understands this is not a good solution to her problem.  We did however talk about getting the implant instead of the Depo shot.  So we will order this today    GYN & OB History  No LMP recorded. (Menstrual status: Birth Control).   Date of Last Pap: No result found    OB History    Para Term  AB Living   2 1 1   1 1   SAB TAB Ectopic Multiple Live Births   1              # Outcome Date GA Lbr Evin/2nd Weight Sex Delivery Anes PTL Lv   2 SAB            1 Term                Review of Systems  Review of Systems   Constitutional: Negative for chills and fever.   Respiratory: Negative for shortness of breath.    Cardiovascular: Negative for chest pain.   Gastrointestinal: Negative for abdominal pain, blood in stool, constipation, diarrhea, nausea, vomiting and reflux.   Genitourinary: Negative for dysmenorrhea, dyspareunia, dysuria, hematuria, hot flashes, menorrhagia, menstrual problem, pelvic pain, vaginal bleeding, vaginal discharge, postcoital bleeding and vaginal dryness.   Musculoskeletal: Negative for arthralgias and joint swelling.   Integumentary:  Negative for rash and hair changes.   Psychiatric/Behavioral: Negative for depression. The patient is not nervous/anxious.            Objective:    Physical Exam:   Constitutional: She appears well-developed and well-nourished. No distress.    HENT:   Head: Normocephalic.    Eyes: Conjunctivae and EOM are normal.    Neck: Normal range of motion. No tracheal deviation present. No thyromegaly present.    Cardiovascular: Exam reveals no clubbing, no cyanosis and no edema.     Pulmonary/Chest: Effort normal. No respiratory distress.        Abdominal: Soft. She exhibits  no distension and no abdominal incision. There is no tenderness.             Musculoskeletal: Normal range of motion and moves all extremeties.        Skin: No rash noted. She is not diaphoretic. No cyanosis. Nails show no clubbing.    Psychiatric: She has a normal mood and affect. Her behavior is normal. Judgment and thought content normal.          Assessment:        1. Encounter for contraceptive management, unspecified type               Plan:      Patient had Pap  Nexplanon ordered

## 2019-09-23 NOTE — PROGRESS NOTES
Subjective:       Patient ID: Erica Law is a 39 y.o. female.    Chief Complaint: Follow-up    HPI     F/u of chronic medical conditions.    HS - on humira doing better    Depression - does not note significant difference on lexapro    Hypothyroidism - compliant with med    HTN - congtrolled on current    predm - attempting weight loss    Review of Systems   Constitutional: Negative for chills, diaphoresis, fatigue, fever and unexpected weight change.   HENT: Negative for congestion, hearing loss, rhinorrhea, sinus pressure and sore throat.    Eyes: Negative for pain and visual disturbance.   Respiratory: Negative for cough, chest tightness, shortness of breath and wheezing.    Cardiovascular: Negative for chest pain, palpitations and leg swelling.   Gastrointestinal: Negative for abdominal pain, constipation, diarrhea, nausea and vomiting.   Genitourinary: Negative for decreased urine volume, dysuria, frequency, hematuria and pelvic pain.   Musculoskeletal: Negative for arthralgias, back pain, myalgias and neck pain.   Skin: Negative for color change, pallor and rash.   Neurological: Negative for dizziness, syncope, weakness, light-headedness and headaches.   Hematological: Negative for adenopathy.   Psychiatric/Behavioral: Negative for decreased concentration, dysphoric mood and sleep disturbance. The patient is not nervous/anxious.        Objective:      Physical Exam    Assessment:       1. Depression, unspecified depression type    2. Hidradenitis suppurativa    3. Generalized anxiety disorder    4. Iron deficiency anemia due to chronic blood loss    5. Prediabetes    6. Hypomagnesemia    7. Hypophosphatemia        Plan:       Depression, unspecified depression type  -     escitalopram oxalate (LEXAPRO) 20 MG tablet; Take 1 tablet (20 mg total) by mouth once daily.  Dispense: 30 tablet; Refill: 3    Hidradenitis suppurativa  -     traMADol (ULTRAM) 50 mg tablet; Take 1 tablet (50 mg total) by mouth every  8 (eight) hours as needed for Pain.  Dispense: 60 tablet; Refill: 0    Generalized anxiety disorder  -     clonazePAM (KLONOPIN) 1 MG tablet; Take 1 tablet (1 mg total) by mouth 2 (two) times daily as needed for Anxiety.  Dispense: 60 tablet; Refill: 0    Iron deficiency anemia due to chronic blood loss  -     CBC auto differential; Future; Expected date: 09/23/2019    Prediabetes  -     Comprehensive metabolic panel; Future; Expected date: 09/23/2019  -     Hemoglobin A1c; Future; Expected date: 09/23/2019    Hypomagnesemia  -     Magnesium; Future; Expected date: 09/23/2019    Hypophosphatemia  -     PHOSPHORUS; Future; Expected date: 09/23/2019

## 2019-10-08 DIAGNOSIS — Z72.0 TOBACCO ABUSE: Primary | ICD-10-CM

## 2019-10-08 RX ORDER — ASPIRIN/CALCIUM CARB/MAGNESIUM 325 MG
4 TABLET ORAL
Qty: 72 LOZENGE | Refills: 0 | Status: SHIPPED | OUTPATIENT
Start: 2019-10-08 | End: 2021-06-29

## 2019-10-21 ENCOUNTER — PROCEDURE VISIT (OUTPATIENT)
Dept: OBSTETRICS AND GYNECOLOGY | Facility: CLINIC | Age: 39
End: 2019-10-21
Payer: MEDICAID

## 2019-10-21 ENCOUNTER — OFFICE VISIT (OUTPATIENT)
Dept: FAMILY MEDICINE | Facility: CLINIC | Age: 39
End: 2019-10-21
Payer: MEDICAID

## 2019-10-21 VITALS
SYSTOLIC BLOOD PRESSURE: 140 MMHG | WEIGHT: 232 LBS | DIASTOLIC BLOOD PRESSURE: 70 MMHG | HEART RATE: 90 BPM | BODY MASS INDEX: 45.31 KG/M2

## 2019-10-21 VITALS
SYSTOLIC BLOOD PRESSURE: 130 MMHG | HEART RATE: 83 BPM | OXYGEN SATURATION: 98 % | DIASTOLIC BLOOD PRESSURE: 88 MMHG | BODY MASS INDEX: 45.31 KG/M2 | WEIGHT: 232 LBS

## 2019-10-21 DIAGNOSIS — L73.2 HIDRADENITIS SUPPURATIVA: ICD-10-CM

## 2019-10-21 DIAGNOSIS — R73.03 PREDIABETES: Primary | ICD-10-CM

## 2019-10-21 DIAGNOSIS — F41.1 GENERALIZED ANXIETY DISORDER: ICD-10-CM

## 2019-10-21 DIAGNOSIS — Z30.017 NEXPLANON INSERTION: Primary | ICD-10-CM

## 2019-10-21 DIAGNOSIS — E78.5 HYPERLIPIDEMIA, UNSPECIFIED HYPERLIPIDEMIA TYPE: ICD-10-CM

## 2019-10-21 DIAGNOSIS — F32.A DEPRESSION, UNSPECIFIED DEPRESSION TYPE: ICD-10-CM

## 2019-10-21 PROCEDURE — 99214 PR OFFICE/OUTPT VISIT, EST, LEVL IV, 30-39 MIN: ICD-10-PCS | Mod: S$GLB,,, | Performed by: FAMILY MEDICINE

## 2019-10-21 PROCEDURE — 11981 INSERTION DRUG DLVR IMPLANT: CPT | Mod: S$GLB,,, | Performed by: OBSTETRICS & GYNECOLOGY

## 2019-10-21 PROCEDURE — 99214 OFFICE O/P EST MOD 30 MIN: CPT | Mod: S$GLB,,, | Performed by: FAMILY MEDICINE

## 2019-10-21 PROCEDURE — 11981 INSERTION OF NEXPLANON: ICD-10-PCS | Mod: S$GLB,,, | Performed by: OBSTETRICS & GYNECOLOGY

## 2019-10-21 RX ORDER — CLONAZEPAM 1 MG/1
1 TABLET ORAL 2 TIMES DAILY PRN
Qty: 60 TABLET | Refills: 0 | Status: SHIPPED | OUTPATIENT
Start: 2019-10-21 | End: 2019-11-25 | Stop reason: SDUPTHER

## 2019-10-21 RX ORDER — TRAMADOL HYDROCHLORIDE 50 MG/1
50 TABLET ORAL EVERY 8 HOURS PRN
Qty: 60 TABLET | Refills: 0 | Status: SHIPPED | OUTPATIENT
Start: 2019-10-21 | End: 2019-12-23 | Stop reason: SDUPTHER

## 2019-10-21 NOTE — PROGRESS NOTES
Subjective:       Patient ID: Erica Law is a 39 y.o. female.    Chief Complaint: Follow-up (one month check up)    HPI     F/u of chronic medical conditions.    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 Not following any particular diet, typically eats little during the day then large amounts at night. Notes weight gain since stopped smoking    Review of Systems   Constitutional: Negative for chills, diaphoresis, fatigue and fever.   HENT: Negative for congestion, hearing loss, rhinorrhea, sinus pressure and sore throat.    Eyes: Negative for visual disturbance.   Respiratory: Negative for cough, chest tightness, shortness of breath and wheezing.    Cardiovascular: Negative for chest pain, palpitations and leg swelling.   Gastrointestinal: Negative for abdominal pain, constipation, diarrhea, nausea and vomiting.   Endocrine: Negative for polydipsia and polyuria.   Genitourinary: Negative for decreased urine volume, dysuria, frequency and hematuria.   Musculoskeletal: Positive for myalgias. Negative for arthralgias and back pain.   Skin: Negative for color change, pallor and rash.   Neurological: Negative for dizziness, syncope, weakness, light-headedness and headaches.   Hematological: Negative for adenopathy.   Psychiatric/Behavioral: Negative for dysphoric mood and sleep disturbance. The patient is nervous/anxious.        Objective:      Physical Exam   Constitutional: She is oriented to person, place, and time. She appears well-developed and well-nourished. No distress.   HENT:   Head: Normocephalic and atraumatic.   Nose: Nose normal.   Mouth/Throat: Oropharynx  is clear and moist.   Eyes: Conjunctivae are normal.   Neck: Normal range of motion. Neck supple. No JVD present. No thyromegaly present.   Cardiovascular: Normal rate, regular rhythm, normal heart sounds and intact distal pulses. Exam reveals no gallop and no friction rub.   No murmur heard.  Pulmonary/Chest: Effort normal and breath sounds normal. No stridor. No respiratory distress. She has no wheezes. She has no rales.   Abdominal: Soft. Bowel sounds are normal. She exhibits no distension. There is no tenderness.   Musculoskeletal: She exhibits no edema or tenderness.   Lymphadenopathy:     She has no cervical adenopathy.   Neurological: She is alert and oriented to person, place, and time.   Skin: Skin is warm and dry. No rash noted. She is not diaphoretic. No erythema. No pallor.   Psychiatric: She has a normal mood and affect. Her behavior is normal.   Vitals reviewed.      Assessment:       1. Prediabetes    2. Hyperlipidemia, unspecified hyperlipidemia type    3. Hidradenitis suppurativa    4. Depression, unspecified depression type    5. Generalized anxiety disorder    6. BMI 45.0-49.9, adult        Plan:       Erica was seen today for follow-up.    Diagnoses and all orders for this visit:    Prediabetes  Comments:  metformin and weight loss  Orders:  -     Comprehensive metabolic panel; Future  -     Hemoglobin A1c; Future  -     Urinalysis; Future  -     Comprehensive metabolic panel  -     Hemoglobin A1c  -     Urinalysis    Hyperlipidemia, unspecified hyperlipidemia type  Comments:  continue statin  Orders:  -     TSH; Future  -     Lipid panel; Future  -     TSH  -     Lipid panel    Hidradenitis suppurativa  Comments:  improved on humira  Orders:  -     CBC auto differential; Future  -     CBC auto differential  -     traMADol (ULTRAM) 50 mg tablet; Take 1 tablet (50 mg total) by mouth every 8 (eight) hours as needed for Pain.    Depression, unspecified depression type  Comments:  continue  lexapro    Generalized anxiety disorder  -     clonazePAM (KLONOPIN) 1 MG tablet; Take 1 tablet (1 mg total) by mouth 2 (two) times daily as needed for Anxiety.    BMI 45.0-49.9, adult  Comments:  counseled on diet/exercise and weight loss plan/goal

## 2019-10-21 NOTE — PROCEDURES
Insertion of Nexplanon  Date/Time: 10/21/2019 3:15 PM  Performed by: Ellis Palacios MD  Authorized by: Ellis Palacios MD     Consent obtained:  Verbal  Consent given by:  Patient  Patient questions answered: yes    Instructions and paperwork completed: yes    Pre-procedure timeout performed: yes    Local anesthetic:  Xylocaine with epinephrine  The site was cleaned and prepped in a sterile fashion: yes    Small stab incision was made in arm: yes    Left/right:  Left   68 mg etonogestrel 68 mg  Preloaded Implanon trocar was placed subdermally: yes    Visualization of implant was obtained: yes    Nexplanon was inserted and trocar removed: yes    Visualization of notch in stilette and palpitation of device: yes    Palpitation confirms placement by provider and patient: yes     Dressing applied

## 2019-10-22 LAB
ABS NRBC COUNT: 0 X 10 3/UL (ref 0–0.01)
ABSOLUTE BASOPHIL: 0.03 X 10 3/UL (ref 0–0.22)
ABSOLUTE EOSINOPHIL: 0.09 X 10 3/UL (ref 0.04–0.54)
ABSOLUTE IMMATURE GRAN: 0.05 X 10 3/UL (ref 0–0.04)
ABSOLUTE LYMPHOCYTE: 2.96 X 10 3/UL (ref 0.86–4.75)
ABSOLUTE MONOCYTE: 0.55 X 10 3/UL (ref 0.22–1.08)
ALBUMIN SERPL-MCNC: 4.5 G/DL (ref 3.5–5.2)
ALBUMIN/GLOB SERPL ELPH: 1 {RATIO} (ref 1–2.7)
ALP ISOS SERPL LEV INH-CCNC: 73 U/L (ref 35–105)
ALT (SGPT): 13 U/L (ref 0–33)
AMORPH URATE CRY URNS QL MICRO: NEGATIVE
ANION GAP SERPL CALC-SCNC: 14 MMOL/L (ref 8–17)
AST SERPL-CCNC: 13 U/L (ref 0–32)
BACTERIA #/AREA URNS HPF: ABNORMAL /[HPF]
BASOPHILS NFR BLD: 0.3 % (ref 0.2–1.2)
BILIRUB UR QL STRIP: NEGATIVE
BILIRUBIN, TOTAL: 0.56 MG/DL (ref 0–1.2)
BUN/CREAT SERPL: 17 (ref 6–20)
CALCIUM SERPL-MCNC: 9.6 MG/DL (ref 8.6–10.2)
CARBON DIOXIDE, CO2: 24 MMOL/L (ref 22–29)
CHLORIDE: 101 MMOL/L (ref 98–107)
CHOLEST SERPL-MSCNC: 211 MG/DL (ref 100–200)
CLARITY UR: ABNORMAL
COLOR UR: YELLOW
CREAT SERPL-MCNC: 0.7 MG/DL (ref 0.5–0.9)
EOSINOPHIL NFR BLD: 0.8 % (ref 0.7–7)
EPITHELIAL CELLS: ABNORMAL
ESTIMATED AVERAGE GLUCOSE: 108 MG/DL
GFR ESTIMATION: 93.65
GLOBULIN: 4.4 G/DL (ref 1.5–4.5)
GLUCOSE (UA): NEGATIVE MG/DL
GLUCOSE: 87 MG/DL (ref 74–106)
HBA1C MFR BLD: 5.4 % (ref 4–6)
HCT VFR BLD AUTO: 42 % (ref 37–47)
HDLC SERPL-MCNC: 100 MG/DL
HGB BLD-MCNC: 13.1 G/DL (ref 12–16)
IMMATURE GRANULOCYTES: 0.4 % (ref 0–0.5)
KETONES UR QL STRIP: NEGATIVE MG/DL
LDL/HDL RATIO: 0.9 (ref 1–3)
LDLC SERPL CALC-MCNC: 94.6 MG/DL (ref 0–100)
LEUKOCYTE ESTERASE UR QL STRIP: NEGATIVE
LYMPHOCYTES NFR BLD: 25.2 % (ref 19.3–53.1)
MCH RBC QN AUTO: 30.4 PG (ref 27–32)
MCHC RBC AUTO-ENTMCNC: 31.2 G/DL (ref 32–36)
MCV RBC AUTO: 97.4 FL (ref 82–100)
MONOCYTES NFR BLD: 4.7 % (ref 4.7–12.5)
MUCOUS THREADS URNS QL MICRO: NEGATIVE
NEUTROPHILS ABSOLUTE COUNT: 8.06 X 10 3/UL (ref 2.15–7.56)
NEUTROPHILS NFR BLD: 68.6 %
NITRITE UR QL STRIP: NEGATIVE
NUCLEATED RED BLOOD CELLS: 0 /100 WBC (ref 0–0.2)
OCCULT BLOOD: NEGATIVE
PH, URINE: 7 (ref 5–7.5)
PLATELET # BLD AUTO: 239 X 10 3/UL (ref 135–400)
POTASSIUM: 3.9 MMOL/L (ref 3.5–5.1)
PROT SNV-MCNC: 8.9 G/DL (ref 6.4–8.3)
PROT UR QL STRIP: NEGATIVE MG/DL
RBC # BLD AUTO: 4.31 X 10 6/UL (ref 4.2–5.4)
RBC/HPF: NEGATIVE
RDW-SD: 49.9 FL (ref 37–54)
SODIUM: 139 MMOL/L (ref 136–145)
SP GR UR STRIP: 1 (ref 1–1.03)
TRIGL SERPL-MCNC: 82 MG/DL (ref 0–150)
TSH SERPL DL<=0.005 MIU/L-ACNC: 0.93 UIU/ML (ref 0.27–4.2)
UREA NITROGEN (BUN): 11.9 MG/DL (ref 6–20)
UROBILINOGEN, URINE: NORMAL E.U./DL (ref 0–1)
WBC # BLD: 11.74 X 10 3/UL (ref 4.3–10.8)
WBC/HPF: ABNORMAL

## 2019-10-24 DIAGNOSIS — Z72.0 TOBACCO ABUSE: ICD-10-CM

## 2019-10-24 RX ORDER — DIPHENHYDRAMINE HCL 25 MG
CAPSULE ORAL
Qty: 100 EACH | Refills: 0 | Status: SHIPPED | OUTPATIENT
Start: 2019-10-24 | End: 2021-06-29

## 2019-11-04 RX ORDER — CLINDAMYCIN HYDROCHLORIDE 300 MG/1
300 CAPSULE ORAL EVERY 6 HOURS
Qty: 28 CAPSULE | Refills: 0 | Status: SHIPPED | OUTPATIENT
Start: 2019-11-04 | End: 2019-11-11

## 2019-11-05 ENCOUNTER — OFFICE VISIT (OUTPATIENT)
Dept: FAMILY MEDICINE | Facility: CLINIC | Age: 39
End: 2019-11-05
Payer: MEDICAID

## 2019-11-05 VITALS
SYSTOLIC BLOOD PRESSURE: 128 MMHG | HEART RATE: 81 BPM | OXYGEN SATURATION: 99 % | DIASTOLIC BLOOD PRESSURE: 90 MMHG | TEMPERATURE: 99 F

## 2019-11-05 DIAGNOSIS — L73.2 HIDRADENITIS SUPPURATIVA: ICD-10-CM

## 2019-11-05 DIAGNOSIS — L02.91 ABSCESS: ICD-10-CM

## 2019-11-05 DIAGNOSIS — J06.9 UPPER RESPIRATORY TRACT INFECTION, UNSPECIFIED TYPE: Primary | ICD-10-CM

## 2019-11-05 PROCEDURE — 99214 PR OFFICE/OUTPT VISIT, EST, LEVL IV, 30-39 MIN: ICD-10-PCS | Mod: S$GLB,,, | Performed by: FAMILY MEDICINE

## 2019-11-05 PROCEDURE — 99214 OFFICE O/P EST MOD 30 MIN: CPT | Mod: S$GLB,,, | Performed by: FAMILY MEDICINE

## 2019-11-05 RX ORDER — PROMETHAZINE HYDROCHLORIDE AND CODEINE PHOSPHATE 6.25; 1 MG/5ML; MG/5ML
5 SOLUTION ORAL EVERY 4 HOURS PRN
Qty: 118 ML | Refills: 0 | Status: SHIPPED | OUTPATIENT
Start: 2019-11-05 | End: 2019-11-15

## 2019-11-05 RX ORDER — SULFAMETHOXAZOLE AND TRIMETHOPRIM 800; 160 MG/1; MG/1
1 TABLET ORAL 2 TIMES DAILY
Qty: 14 TABLET | Refills: 0 | Status: SHIPPED | OUTPATIENT
Start: 2019-11-05 | End: 2019-11-12

## 2019-11-05 RX ORDER — BENZONATATE 100 MG/1
100 CAPSULE ORAL 3 TIMES DAILY PRN
Qty: 30 CAPSULE | Refills: 0 | Status: SHIPPED | OUTPATIENT
Start: 2019-11-05 | End: 2019-11-15

## 2019-11-05 NOTE — PROGRESS NOTES
"Subjective:       Patient ID: Erica Law is a 39 y.o. female.    Chief Complaint: Recurrent Skin Infections (hurting since Thurs; located at the vaginal area; began to drain with a "foul smell."  Pt states she does not have as much pain since on the antibiotic. ); Cough (sneezing, ears hurting, ); and Sore Throat    HPI     Here for f/u HS. Notes vaginal abscess, similar to previous episodes. Has not started clinda yet. Abscess has been draining foul smelling discharge. No f/c, n/v, weakness, abd pain. Has been on humira for HS for 2 months.     Also c/o uri symptoms (cough, congestion, sore throat, hoarseness) for several days. Has taken otc mucinex. No sob, wheezing, decreased po.     Review of Systems   Constitutional: Negative for chills, fatigue and fever.   HENT: Positive for congestion, rhinorrhea, sore throat and voice change. Negative for trouble swallowing.    Eyes: Negative for visual disturbance.   Respiratory: Positive for cough. Negative for chest tightness, shortness of breath and wheezing.    Cardiovascular: Negative for chest pain, palpitations and leg swelling.   Gastrointestinal: Negative for abdominal pain, constipation, diarrhea, nausea and vomiting.   Genitourinary: Negative for difficulty urinating, dysuria, frequency, hematuria, pelvic pain, vaginal bleeding and vaginal discharge.   Musculoskeletal: Positive for myalgias. Negative for arthralgias and back pain.   Skin: Positive for wound. Negative for color change, pallor and rash.   Neurological: Negative for dizziness, syncope, weakness, light-headedness and headaches.   Psychiatric/Behavioral: Negative for dysphoric mood and sleep disturbance. The patient is not nervous/anxious.        Objective:      Physical Exam   Constitutional: She is oriented to person, place, and time. She appears well-developed and well-nourished. No distress.   HENT:   Head: Normocephalic and atraumatic.   Nose: Nose normal.   Mouth/Throat: Oropharynx is " clear and moist.   Eyes: Conjunctivae are normal.   Neck: Normal range of motion. Neck supple. No thyromegaly present.   Cardiovascular: Normal rate, regular rhythm and normal heart sounds. Exam reveals no gallop and no friction rub.   No murmur heard.  Pulmonary/Chest: Effort normal and breath sounds normal. No stridor. No respiratory distress. She has no wheezes.   Abdominal: Soft. Bowel sounds are normal. She exhibits no distension. There is no tenderness.   Musculoskeletal: She exhibits no edema.   Neurological: She is alert and oriented to person, place, and time.   Skin: Skin is warm and dry. She is not diaphoretic. No pallor.   Psychiatric: She has a normal mood and affect. Her behavior is normal.   Vitals reviewed.      Assessment:       1. Upper respiratory tract infection, unspecified type    2. Abscess    3. Hidradenitis suppurativa        Plan:       Erica was seen today for recurrent skin infections, cough and sore throat.    Diagnoses and all orders for this visit:    Upper respiratory tract infection, unspecified type  -     promethazine-codeine 6.25-10 mg/5 ml (PHENERGAN WITH CODEINE) 6.25-10 mg/5 mL syrup; Take 5 mLs by mouth every 4 (four) hours as needed for Cough.  -     benzonatate (TESSALON) 100 MG capsule; Take 1 capsule (100 mg total) by mouth 3 (three) times daily as needed for Cough (day time med).    Abscess  Comments:  clinda and bactrim  Orders:  -     sulfamethoxazole-trimethoprim 800-160mg (BACTRIM DS) 800-160 mg Tab; Take 1 tablet by mouth 2 (two) times daily. for 7 days    Hidradenitis suppurativa  Comments:  on humira 2 mnths, following w derm

## 2019-11-10 RX ORDER — IBUPROFEN 800 MG/1
TABLET ORAL
Qty: 90 TABLET | Refills: 2 | Status: SHIPPED | OUTPATIENT
Start: 2019-11-10 | End: 2020-02-03

## 2019-11-25 ENCOUNTER — OFFICE VISIT (OUTPATIENT)
Dept: FAMILY MEDICINE | Facility: CLINIC | Age: 39
End: 2019-11-25
Payer: MEDICAID

## 2019-11-25 VITALS — DIASTOLIC BLOOD PRESSURE: 80 MMHG | HEART RATE: 95 BPM | SYSTOLIC BLOOD PRESSURE: 130 MMHG | OXYGEN SATURATION: 98 %

## 2019-11-25 DIAGNOSIS — F41.1 GENERALIZED ANXIETY DISORDER: ICD-10-CM

## 2019-11-25 DIAGNOSIS — R03.0 BLOOD PRESSURE ELEVATED WITHOUT HISTORY OF HTN: ICD-10-CM

## 2019-11-25 DIAGNOSIS — Z72.0 TOBACCO ABUSE: ICD-10-CM

## 2019-11-25 DIAGNOSIS — D50.0 IRON DEFICIENCY ANEMIA DUE TO CHRONIC BLOOD LOSS: ICD-10-CM

## 2019-11-25 DIAGNOSIS — E03.9 HYPOTHYROIDISM, UNSPECIFIED TYPE: ICD-10-CM

## 2019-11-25 DIAGNOSIS — L73.2 HIDRADENITIS SUPPURATIVA: ICD-10-CM

## 2019-11-25 DIAGNOSIS — R73.03 PREDIABETES: Primary | ICD-10-CM

## 2019-11-25 PROCEDURE — 99214 PR OFFICE/OUTPT VISIT, EST, LEVL IV, 30-39 MIN: ICD-10-PCS | Mod: S$GLB,,, | Performed by: FAMILY MEDICINE

## 2019-11-25 PROCEDURE — 99214 OFFICE O/P EST MOD 30 MIN: CPT | Mod: S$GLB,,, | Performed by: FAMILY MEDICINE

## 2019-11-25 RX ORDER — CLONAZEPAM 1 MG/1
1 TABLET ORAL 2 TIMES DAILY PRN
Qty: 60 TABLET | Refills: 0 | Status: SHIPPED | OUTPATIENT
Start: 2019-11-25 | End: 2019-12-23 | Stop reason: SDUPTHER

## 2019-11-25 RX ORDER — METFORMIN HYDROCHLORIDE 500 MG/1
500 TABLET ORAL
Qty: 90 TABLET | Refills: 1 | Status: SHIPPED | OUTPATIENT
Start: 2019-11-25 | End: 2019-12-18 | Stop reason: SDUPTHER

## 2019-11-25 NOTE — PROGRESS NOTES
Subjective:       Patient ID: Erica Law is a 39 y.o. female.    Chief Complaint: Hypertension (Checking BP in the pharmacy with 157/84; )    HPI     F/u of chronic medical conditions. Also she notes bp was elevated when checking at drug store. Does not typically have high bp readings.     PreDM - a1c 5.9 > 5.4. Compliant with metformin    HS - finished abx, abscess resolved. Getting back on humira    Hypothyroidism - compliant with med, last tfts wnl    NEY - had nexplanon placed recently.     Jonny - controlled on prn clonazepam. Does not take prior to or while driving    Pt has been on nicotine replacement, says she did cut down on smoking for some time then started back recently. Noted weight gain as she cut down     Review of Systems   Constitutional: Negative for chills, diaphoresis, fatigue and fever.   HENT: Negative for congestion, rhinorrhea and sore throat.    Eyes: Negative for pain and visual disturbance.   Respiratory: Negative for cough, chest tightness, shortness of breath and wheezing.    Cardiovascular: Negative for chest pain, palpitations and leg swelling.   Gastrointestinal: Negative for abdominal pain, constipation, diarrhea, nausea and vomiting.   Endocrine: Negative for polydipsia and polyuria.   Genitourinary: Negative for decreased urine volume, dysuria, frequency and hematuria.   Musculoskeletal: Positive for myalgias. Negative for arthralgias and back pain.   Skin: Negative for color change, pallor, rash and wound.   Neurological: Negative for dizziness, syncope, weakness, light-headedness and headaches.   Hematological: Negative for adenopathy.   Psychiatric/Behavioral: Negative for dysphoric mood and sleep disturbance. The patient is not nervous/anxious.        Objective:      Physical Exam   Constitutional: She is oriented to person, place, and time. She appears well-developed and well-nourished. No distress.   HENT:   Head: Normocephalic and atraumatic.   Nose: Nose normal.    Mouth/Throat: Oropharynx is clear and moist.   Eyes: Conjunctivae are normal.   Neck: Normal range of motion. Neck supple. No JVD present. No thyromegaly present.   Cardiovascular: Normal rate, regular rhythm, normal heart sounds and intact distal pulses. Exam reveals no gallop and no friction rub.   No murmur heard.  Pulmonary/Chest: Effort normal and breath sounds normal. No stridor. No respiratory distress. She has no wheezes. She has no rales.   Abdominal: Soft. Bowel sounds are normal. She exhibits no distension. There is no tenderness.   Musculoskeletal: She exhibits no edema or tenderness.   Lymphadenopathy:     She has no cervical adenopathy.   Neurological: She is alert and oriented to person, place, and time.   Skin: Skin is warm and dry. No rash noted. She is not diaphoretic. No erythema. No pallor.   Psychiatric: She has a normal mood and affect. Her behavior is normal.   Vitals reviewed.      Assessment:       1. Prediabetes    2. Blood pressure elevated without history of HTN    3. Iron deficiency anemia due to chronic blood loss    4. Hypothyroidism, unspecified type    5. Hidradenitis suppurativa    6. Generalized anxiety disorder    7. Tobacco abuse        Plan:       Erica was seen today for hypertension.    Diagnoses and all orders for this visit:    Prediabetes  Comments:  continue metformin  Orders:  -     metFORMIN (GLUCOPHAGE) 500 MG tablet; Take 1 tablet (500 mg total) by mouth daily with breakfast.    Blood pressure elevated without history of HTN  Comments:  advised to get wrist cuff and keep bp log    Iron deficiency anemia due to chronic blood loss  Comments:  nexplanon    Hypothyroidism, unspecified type  Comments:  continue current med    Hidradenitis suppurativa  Comments:  getting back on humira    Generalized anxiety disorder  -     clonazePAM (KLONOPIN) 1 MG tablet; Take 1 tablet (1 mg total) by mouth 2 (two) times daily as needed for Anxiety.    Tobacco  abuse  Comments:  counseled on smoking cessation

## 2019-12-09 DIAGNOSIS — D50.0 IRON DEFICIENCY ANEMIA DUE TO CHRONIC BLOOD LOSS: Primary | ICD-10-CM

## 2019-12-10 ENCOUNTER — OFFICE VISIT (OUTPATIENT)
Dept: FAMILY MEDICINE | Facility: CLINIC | Age: 39
End: 2019-12-10
Payer: MEDICAID

## 2019-12-10 VITALS
SYSTOLIC BLOOD PRESSURE: 110 MMHG | HEART RATE: 92 BPM | TEMPERATURE: 99 F | OXYGEN SATURATION: 99 % | DIASTOLIC BLOOD PRESSURE: 80 MMHG

## 2019-12-10 DIAGNOSIS — J06.9 UPPER RESPIRATORY TRACT INFECTION, UNSPECIFIED TYPE: Primary | ICD-10-CM

## 2019-12-10 PROCEDURE — 99213 PR OFFICE/OUTPT VISIT, EST, LEVL III, 20-29 MIN: ICD-10-PCS | Mod: S$GLB,,, | Performed by: FAMILY MEDICINE

## 2019-12-10 PROCEDURE — 99213 OFFICE O/P EST LOW 20 MIN: CPT | Mod: S$GLB,,, | Performed by: FAMILY MEDICINE

## 2019-12-10 RX ORDER — AZITHROMYCIN 250 MG/1
TABLET, FILM COATED ORAL
Qty: 6 TABLET | Refills: 0 | Status: SHIPPED | OUTPATIENT
Start: 2019-12-10 | End: 2020-01-23 | Stop reason: ALTCHOICE

## 2019-12-10 RX ORDER — PROMETHAZINE HYDROCHLORIDE AND CODEINE PHOSPHATE 6.25; 1 MG/5ML; MG/5ML
5 SOLUTION ORAL EVERY 4 HOURS PRN
Qty: 240 ML | Refills: 0 | Status: SHIPPED | OUTPATIENT
Start: 2019-12-10 | End: 2019-12-20

## 2019-12-10 RX ORDER — ALBUTEROL SULFATE 90 UG/1
2 AEROSOL, METERED RESPIRATORY (INHALATION) EVERY 6 HOURS PRN
Qty: 18 G | Refills: 0 | Status: SHIPPED | OUTPATIENT
Start: 2019-12-10 | End: 2020-12-09

## 2019-12-10 NOTE — PROGRESS NOTES
Subjective:       Patient ID: Erica Law is a 39 y.o. female.    Chief Complaint: Cough (For the past 3 days and has had mucus yellowish with a little blood. OTC advil cold)    HPI     Pt c/o cough, sputum yellow with small bit of blood. She has been using otc meds. She was recently started back on humira 2 weeks ago. No f/c, sob, wheezing, n/v. No sick contacts.    Review of Systems   Constitutional: Positive for fatigue. Negative for chills, diaphoresis and fever.   HENT: Negative for congestion, hearing loss, rhinorrhea, sore throat, trouble swallowing and voice change.    Eyes: Negative for redness and visual disturbance.   Respiratory: Positive for cough. Negative for chest tightness, shortness of breath and wheezing.    Cardiovascular: Negative for chest pain, palpitations and leg swelling.   Gastrointestinal: Negative for abdominal pain, nausea and vomiting.   Genitourinary: Negative for decreased urine volume and difficulty urinating.   Musculoskeletal: Negative for arthralgias and myalgias.   Skin: Negative for rash.   Neurological: Negative for dizziness, weakness, light-headedness and headaches.       Objective:      Physical Exam   Constitutional: She is oriented to person, place, and time. She appears well-developed and well-nourished. No distress.   HENT:   Head: Normocephalic and atraumatic.   Nose: Nose normal.   Mouth/Throat: Oropharynx is clear and moist.   Eyes: Conjunctivae are normal.   Neck: Normal range of motion. Neck supple. No thyromegaly present.   Cardiovascular: Normal rate, regular rhythm and normal heart sounds. Exam reveals no gallop and no friction rub.   No murmur heard.  Pulmonary/Chest: Effort normal. No stridor. No respiratory distress. She has no wheezes. She has no rales.   Coarse breath sounds   Musculoskeletal: She exhibits no edema.   Lymphadenopathy:     She has no cervical adenopathy.   Neurological: She is alert and oriented to person, place, and time.   Skin: Skin  is warm and dry. She is not diaphoretic. No pallor.   Psychiatric: She has a normal mood and affect. Her behavior is normal.   Vitals reviewed.      Assessment:       1. Upper respiratory tract infection, unspecified type        Plan:       Erica was seen today for cough.    Diagnoses and all orders for this visit:    Upper respiratory tract infection, unspecified type  -     X-Ray Chest PA And Lateral; Future  -     X-Ray Chest PA And Lateral  -     azithromycin (Z-ALPA) 250 MG tablet; Take 2 tablets once then 1 tablet daily for 4 days  -     promethazine-codeine 6.25-10 mg/5 ml (PHENERGAN WITH CODEINE) 6.25-10 mg/5 mL syrup; Take 5 mLs by mouth every 4 (four) hours as needed for Cough.  -     albuterol (PROVENTIL/VENTOLIN HFA) 90 mcg/actuation inhaler; Inhale 2 puffs into the lungs every 6 (six) hours as needed for Wheezing. Rescue    given ed precautions. Rtc in 2 days for check

## 2019-12-11 DIAGNOSIS — Z72.0 TOBACCO ABUSE: Primary | ICD-10-CM

## 2019-12-11 RX ORDER — LEVOTHYROXINE SODIUM 50 UG/1
50 TABLET ORAL
Qty: 90 TABLET | Refills: 1 | Status: SHIPPED | OUTPATIENT
Start: 2019-12-11 | End: 2020-05-25

## 2019-12-11 RX ORDER — NICOTINE 7MG/24HR
1 PATCH, TRANSDERMAL 24 HOURS TRANSDERMAL DAILY
Qty: 28 PATCH | Refills: 2 | Status: SHIPPED | OUTPATIENT
Start: 2019-12-11 | End: 2021-06-29

## 2019-12-18 DIAGNOSIS — R73.03 PREDIABETES: Primary | ICD-10-CM

## 2019-12-19 DIAGNOSIS — R73.03 PREDIABETES: ICD-10-CM

## 2019-12-19 RX ORDER — METFORMIN HYDROCHLORIDE 500 MG/1
500 TABLET ORAL
Qty: 90 TABLET | Refills: 1 | Status: SHIPPED | OUTPATIENT
Start: 2019-12-19 | End: 2019-12-19 | Stop reason: SDUPTHER

## 2019-12-19 RX ORDER — METFORMIN HYDROCHLORIDE 500 MG/1
500 TABLET ORAL 2 TIMES DAILY WITH MEALS
Qty: 180 TABLET | Refills: 1 | Status: SHIPPED | OUTPATIENT
Start: 2019-12-19 | End: 2020-06-22

## 2019-12-19 RX ORDER — METFORMIN HYDROCHLORIDE 500 MG/1
500 TABLET ORAL
Qty: 180 TABLET | Refills: 1 | Status: SHIPPED | OUTPATIENT
Start: 2019-12-19 | End: 2019-12-19 | Stop reason: SDUPTHER

## 2019-12-23 ENCOUNTER — OFFICE VISIT (OUTPATIENT)
Dept: FAMILY MEDICINE | Facility: CLINIC | Age: 39
End: 2019-12-23
Payer: MEDICAID

## 2019-12-23 VITALS
BODY MASS INDEX: 48.24 KG/M2 | OXYGEN SATURATION: 99 % | DIASTOLIC BLOOD PRESSURE: 90 MMHG | TEMPERATURE: 99 F | WEIGHT: 247 LBS | SYSTOLIC BLOOD PRESSURE: 130 MMHG | HEART RATE: 92 BPM

## 2019-12-23 DIAGNOSIS — F41.1 GENERALIZED ANXIETY DISORDER: ICD-10-CM

## 2019-12-23 DIAGNOSIS — L73.2 HIDRADENITIS SUPPURATIVA: ICD-10-CM

## 2019-12-23 PROCEDURE — 99213 PR OFFICE/OUTPT VISIT, EST, LEVL III, 20-29 MIN: ICD-10-PCS | Mod: S$GLB,,, | Performed by: FAMILY MEDICINE

## 2019-12-23 PROCEDURE — 99213 OFFICE O/P EST LOW 20 MIN: CPT | Mod: S$GLB,,, | Performed by: FAMILY MEDICINE

## 2019-12-23 RX ORDER — CLONAZEPAM 1 MG/1
1 TABLET ORAL 2 TIMES DAILY PRN
Qty: 60 TABLET | Refills: 0 | Status: SHIPPED | OUTPATIENT
Start: 2019-12-23 | End: 2020-01-23 | Stop reason: SDUPTHER

## 2019-12-23 RX ORDER — TRAMADOL HYDROCHLORIDE 50 MG/1
50 TABLET ORAL EVERY 8 HOURS PRN
Qty: 60 TABLET | Refills: 0 | Status: SHIPPED | OUTPATIENT
Start: 2019-12-23 | End: 2020-01-23 | Stop reason: SDUPTHER

## 2019-12-23 RX ORDER — SULFAMETHOXAZOLE AND TRIMETHOPRIM 800; 160 MG/1; MG/1
1 TABLET ORAL 2 TIMES DAILY
Qty: 14 TABLET | Refills: 0 | Status: SHIPPED | OUTPATIENT
Start: 2019-12-23 | End: 2019-12-30

## 2019-12-23 NOTE — PROGRESS NOTES
Subjective:       Patient ID: Erica Law is a 39 y.o. female.    Chief Complaint: Medication Refill and Recurrent Skin Infections    HPI   F/u of chronic medical conditions.    Hs- restarted humira but notes boil of groin area. Similar to previous, no drainage yet.    Anxiety - doing well on clonazepam prn  Review of Systems   Constitutional: Negative for chills, fatigue and fever.   Eyes: Negative for visual disturbance.   Respiratory: Negative for cough and shortness of breath.    Cardiovascular: Negative for chest pain and palpitations.   Gastrointestinal: Negative for abdominal pain, nausea and vomiting.   Genitourinary: Negative for dysuria and frequency.   Musculoskeletal: Negative for arthralgias and myalgias.   Skin: Positive for wound. Negative for rash.   Neurological: Negative for dizziness, weakness, light-headedness and headaches.   Psychiatric/Behavioral: The patient is nervous/anxious.        Objective:      Physical Exam   Constitutional: She is oriented to person, place, and time. She appears well-developed and well-nourished. No distress.   HENT:   Head: Normocephalic and atraumatic.   Nose: Nose normal.   Mouth/Throat: Oropharynx is clear and moist.   Eyes: Conjunctivae are normal.   Neck: Normal range of motion. Neck supple.   Cardiovascular: Normal rate, regular rhythm and normal heart sounds. Exam reveals no gallop and no friction rub.   No murmur heard.  Pulmonary/Chest: Effort normal and breath sounds normal. No stridor. No respiratory distress. She has no wheezes.   Musculoskeletal: She exhibits no edema.   Neurological: She is alert and oriented to person, place, and time.   Skin: Skin is warm and dry. She is not diaphoretic. No pallor.   Psychiatric: She has a normal mood and affect. Her behavior is normal.   Vitals reviewed.      Assessment:       1. Hidradenitis suppurativa    2. Generalized anxiety disorder        Plan:       Erica was seen today for medication refill and  recurrent skin infections.    Diagnoses and all orders for this visit:    Hidradenitis suppurativa  Comments:  improved on humira  Orders:  -     sulfamethoxazole-trimethoprim 800-160mg (BACTRIM DS) 800-160 mg Tab; Take 1 tablet by mouth 2 (two) times daily. for 7 days  -     traMADol (ULTRAM) 50 mg tablet; Take 1 tablet (50 mg total) by mouth every 8 (eight) hours as needed for Pain.    Generalized anxiety disorder  -     clonazePAM (KLONOPIN) 1 MG tablet; Take 1 tablet (1 mg total) by mouth 2 (two) times daily as needed for Anxiety.

## 2020-01-08 DIAGNOSIS — E78.5 HYPERLIPIDEMIA, UNSPECIFIED HYPERLIPIDEMIA TYPE: Primary | ICD-10-CM

## 2020-01-08 RX ORDER — ATORVASTATIN CALCIUM 40 MG/1
40 TABLET, FILM COATED ORAL DAILY
Qty: 90 TABLET | Refills: 1 | Status: SHIPPED | OUTPATIENT
Start: 2020-01-08 | End: 2020-06-22

## 2020-01-17 DIAGNOSIS — F32.A DEPRESSION, UNSPECIFIED DEPRESSION TYPE: Primary | ICD-10-CM

## 2020-01-20 RX ORDER — ESCITALOPRAM OXALATE 20 MG/1
20 TABLET ORAL DAILY
Qty: 30 TABLET | Refills: 3 | Status: SHIPPED | OUTPATIENT
Start: 2020-01-20 | End: 2020-05-11

## 2020-01-23 ENCOUNTER — OFFICE VISIT (OUTPATIENT)
Dept: FAMILY MEDICINE | Facility: CLINIC | Age: 40
End: 2020-01-23
Payer: MEDICAID

## 2020-01-23 VITALS
HEIGHT: 60 IN | HEART RATE: 101 BPM | WEIGHT: 254 LBS | SYSTOLIC BLOOD PRESSURE: 120 MMHG | OXYGEN SATURATION: 99 % | DIASTOLIC BLOOD PRESSURE: 60 MMHG | BODY MASS INDEX: 49.87 KG/M2

## 2020-01-23 DIAGNOSIS — B37.31 CANDIDA VAGINITIS: ICD-10-CM

## 2020-01-23 DIAGNOSIS — L73.2 HIDRADENITIS SUPPURATIVA: Primary | ICD-10-CM

## 2020-01-23 DIAGNOSIS — F41.1 GENERALIZED ANXIETY DISORDER: ICD-10-CM

## 2020-01-23 PROCEDURE — 99213 OFFICE O/P EST LOW 20 MIN: CPT | Mod: S$GLB,,, | Performed by: FAMILY MEDICINE

## 2020-01-23 PROCEDURE — 99213 PR OFFICE/OUTPT VISIT, EST, LEVL III, 20-29 MIN: ICD-10-PCS | Mod: S$GLB,,, | Performed by: FAMILY MEDICINE

## 2020-01-23 RX ORDER — CLONAZEPAM 1 MG/1
1 TABLET ORAL 2 TIMES DAILY PRN
Qty: 60 TABLET | Refills: 0 | Status: SHIPPED | OUTPATIENT
Start: 2020-01-23 | End: 2020-02-24 | Stop reason: SDUPTHER

## 2020-01-23 RX ORDER — SPIRONOLACTONE 100 MG/1
TABLET, FILM COATED ORAL
COMMUNITY
Start: 2020-01-06 | End: 2021-02-19

## 2020-01-23 RX ORDER — TRAMADOL HYDROCHLORIDE 50 MG/1
50 TABLET ORAL EVERY 8 HOURS PRN
Qty: 60 TABLET | Refills: 0 | Status: SHIPPED | OUTPATIENT
Start: 2020-01-23 | End: 2020-02-24 | Stop reason: SDUPTHER

## 2020-01-23 RX ORDER — FLUCONAZOLE 150 MG/1
150 TABLET ORAL WEEKLY
Qty: 4 TABLET | Refills: 0 | Status: SHIPPED | OUTPATIENT
Start: 2020-01-23 | End: 2020-02-22

## 2020-01-23 NOTE — PROGRESS NOTES
Subjective:       Patient ID: Erica Law is a 39 y.o. female.    Chief Complaint: f/u    HPI     HS - back on humira, recently had I&D for abscess in ER. Following with derm. Was recently on antibiotics and now thinks she has a yeast infection. Vaginal itching and mild dysuria similar to previous episodes    DEBBIE - controlled on clonazepam prn    Review of Systems   Constitutional: Negative for chills, fatigue and fever.   Eyes: Negative for visual disturbance.   Respiratory: Negative for cough and shortness of breath.    Cardiovascular: Negative for chest pain and palpitations.   Gastrointestinal: Negative for abdominal pain, nausea and vomiting.   Genitourinary: Negative for dysuria and frequency.   Musculoskeletal: Positive for myalgias. Negative for arthralgias.   Skin: Negative for rash.   Neurological: Negative for dizziness, weakness, light-headedness and headaches.   Psychiatric/Behavioral: The patient is nervous/anxious.        Objective:      Physical Exam   Constitutional: She is oriented to person, place, and time. She appears well-developed and well-nourished. No distress.   HENT:   Head: Normocephalic and atraumatic.   Eyes: Conjunctivae are normal.   Neck: Normal range of motion. Neck supple.   Cardiovascular: Normal rate, regular rhythm and normal heart sounds. Exam reveals no gallop and no friction rub.   No murmur heard.  Pulmonary/Chest: Effort normal and breath sounds normal. No stridor. No respiratory distress. She has no wheezes.   Abdominal: Soft. Bowel sounds are normal.   Musculoskeletal: She exhibits no edema.   Neurological: She is alert and oriented to person, place, and time.   Skin: Skin is warm and dry. She is not diaphoretic. No pallor.   Psychiatric: She has a normal mood and affect. Her behavior is normal.   Vitals reviewed.      Assessment:       1. Hidradenitis suppurativa    2. Generalized anxiety disorder    3. Candida vaginitis        Plan:       Diagnoses and all orders  for this visit:    Hidradenitis suppurativa  Comments:  improved on humira  Orders:  -     traMADol (ULTRAM) 50 mg tablet; Take 1 tablet (50 mg total) by mouth every 8 (eight) hours as needed for Pain.    Generalized anxiety disorder  -     clonazePAM (KLONOPIN) 1 MG tablet; Take 1 tablet (1 mg total) by mouth 2 (two) times daily as needed for Anxiety.    Candida vaginitis  -     fluconazole (DIFLUCAN) 150 MG Tab; Take 1 tablet (150 mg total) by mouth once a week.

## 2020-01-27 ENCOUNTER — OFFICE VISIT (OUTPATIENT)
Dept: OBSTETRICS AND GYNECOLOGY | Facility: CLINIC | Age: 40
End: 2020-01-27
Payer: MEDICAID

## 2020-01-27 VITALS
HEART RATE: 97 BPM | BODY MASS INDEX: 50.19 KG/M2 | WEIGHT: 257 LBS | SYSTOLIC BLOOD PRESSURE: 119 MMHG | DIASTOLIC BLOOD PRESSURE: 79 MMHG

## 2020-01-27 DIAGNOSIS — E66.01 MORBIDLY OBESE: ICD-10-CM

## 2020-01-27 DIAGNOSIS — Z01.419 ENCOUNTER FOR GYNECOLOGICAL EXAMINATION WITHOUT ABNORMAL FINDING: Primary | ICD-10-CM

## 2020-01-27 PROCEDURE — 99395 PREV VISIT EST AGE 18-39: CPT | Mod: S$GLB,,, | Performed by: OBSTETRICS & GYNECOLOGY

## 2020-01-27 PROCEDURE — 99395 PR PREVENTIVE VISIT,EST,18-39: ICD-10-PCS | Mod: S$GLB,,, | Performed by: OBSTETRICS & GYNECOLOGY

## 2020-01-27 NOTE — PROGRESS NOTES
Subjective:       Patient ID: Erica Law is a 39 y.o. female.    Chief Complaint:  Annual Exam      History of Present Illness  Pt here for gyn annual.  History and past labs reviewed with patient.    Complaints nothing.  Has Nexplanon.  Patient has multiple medical problems      Review of Systems  Review of Systems   Constitutional: Negative for chills and fever.   Respiratory: Negative for shortness of breath.    Cardiovascular: Negative for chest pain.   Gastrointestinal: Negative for abdominal pain, blood in stool, constipation, diarrhea, nausea, vomiting and reflux.   Genitourinary: Negative for dysmenorrhea, dyspareunia, dysuria, hematuria, hot flashes, menorrhagia, menstrual problem, pelvic pain, vaginal bleeding, vaginal discharge, postcoital bleeding and vaginal dryness.   Musculoskeletal: Negative for arthralgias and joint swelling.   Integumentary:  Negative for rash, hair changes, breast mass, nipple discharge and breast skin changes.   Psychiatric/Behavioral: Negative for depression. The patient is not nervous/anxious.    Breast: Negative for asymmetry, lump, mass, nipple discharge and skin changes          Objective:    Physical Exam:   Constitutional: She appears well-developed and well-nourished. No distress.    HENT:   Head: Normocephalic and atraumatic.    Eyes: Conjunctivae and EOM are normal.    Neck: No tracheal deviation present. No thyromegaly present.    Cardiovascular: Exam reveals no clubbing, no cyanosis and no edema.     Pulmonary/Chest: Effort normal. No respiratory distress.        Abdominal: Soft. She exhibits no distension and no mass. There is no tenderness. There is no rebound and no guarding. No hernia.     Genitourinary: Rectum normal, vagina normal and uterus normal. Pelvic exam was performed with patient supine. There is no rash, tenderness, lesion or injury on the right labia. There is no rash, tenderness, lesion or injury on the left labia. Cervix is normal. Right  Problem: Patient Care Overview (Adult)  Goal: Plan of Care Review  Outcome: Ongoing (interventions implemented as appropriate)    Problem: Seizure Disorder/Epilepsy (Adult)  Goal: Signs and Symptoms of Listed Potential Problems Will be Absent or Manageable (Seizure Disorder/Epilepsy)  Outcome: Ongoing (interventions implemented as appropriate)    Problem: Sepsis (Adult)  Goal: Signs and Symptoms of Listed Potential Problems Will be Absent or Manageable (Sepsis)  Outcome: Ongoing (interventions implemented as appropriate)    Problem: Pressure Ulcer (Adult)  Goal: Signs and Symptoms of Listed Potential Problems Will be Absent or Manageable (Pressure Ulcer)  Outcome: Ongoing (interventions implemented as appropriate)    Problem: Pressure Ulcer Risk (Korey Scale) (Adult,Obstetrics,Pediatric)  Goal: Identify Related Risk Factors and Signs and Symptoms  Outcome: Ongoing (interventions implemented as appropriate)  Goal: Skin Integrity  Outcome: Ongoing (interventions implemented as appropriate)       adnexum displays no mass, no tenderness and no fullness. Left adnexum displays no mass, no tenderness and no fullness.                Skin: She is not diaphoretic. No cyanosis. Nails show no clubbing.         breast exam wnl- no nipple dc, skin changes, masses or lymph nodes palpated bilaterally    Assessment:        1. Encounter for gynecological examination without abnormal finding               Plan:      Annual   Pap today      Risk assessment for inherited gyn cancer done   rtc 3 mo    We talked for 15 min about weight loss today.  I recommended weight watchers weekly weigh ends and possibly diet medication.  Patient is to see her PCP asap.  If everything is normal there is no contraindications we talked about starting Adipex.

## 2020-01-29 LAB
CHLAMYDIA: NEGATIVE
GONORRHEA: NEGATIVE
SOURCE: NORMAL
SOURCE: NORMAL
TRICHOMONAS AMPLIFIED: NEGATIVE

## 2020-02-03 RX ORDER — IBUPROFEN 800 MG/1
TABLET ORAL
Qty: 90 TABLET | Refills: 2 | Status: SHIPPED | OUTPATIENT
Start: 2020-02-03 | End: 2021-06-29

## 2020-02-24 ENCOUNTER — OFFICE VISIT (OUTPATIENT)
Dept: FAMILY MEDICINE | Facility: CLINIC | Age: 40
End: 2020-02-24
Payer: MEDICAID

## 2020-02-24 VITALS
SYSTOLIC BLOOD PRESSURE: 120 MMHG | HEIGHT: 60 IN | BODY MASS INDEX: 51.04 KG/M2 | DIASTOLIC BLOOD PRESSURE: 87 MMHG | HEART RATE: 97 BPM | WEIGHT: 260 LBS | OXYGEN SATURATION: 96 %

## 2020-02-24 DIAGNOSIS — F41.1 GENERALIZED ANXIETY DISORDER: ICD-10-CM

## 2020-02-24 DIAGNOSIS — L73.2 HIDRADENITIS SUPPURATIVA: ICD-10-CM

## 2020-02-24 DIAGNOSIS — R73.03 PREDIABETES: ICD-10-CM

## 2020-02-24 DIAGNOSIS — I10 ESSENTIAL HYPERTENSION: Primary | ICD-10-CM

## 2020-02-24 PROCEDURE — 99214 PR OFFICE/OUTPT VISIT, EST, LEVL IV, 30-39 MIN: ICD-10-PCS | Mod: S$GLB,,, | Performed by: FAMILY MEDICINE

## 2020-02-24 PROCEDURE — 99214 OFFICE O/P EST MOD 30 MIN: CPT | Mod: S$GLB,,, | Performed by: FAMILY MEDICINE

## 2020-02-24 RX ORDER — CLONAZEPAM 1 MG/1
1 TABLET ORAL 2 TIMES DAILY PRN
Qty: 60 TABLET | Refills: 0 | Status: SHIPPED | OUTPATIENT
Start: 2020-02-24 | End: 2020-02-24 | Stop reason: SDUPTHER

## 2020-02-24 RX ORDER — CLONAZEPAM 1 MG/1
1 TABLET ORAL 2 TIMES DAILY PRN
Qty: 60 TABLET | Refills: 0 | Status: SHIPPED | OUTPATIENT
Start: 2020-02-24 | End: 2020-03-23 | Stop reason: SDUPTHER

## 2020-02-24 RX ORDER — TRAMADOL HYDROCHLORIDE 50 MG/1
50 TABLET ORAL EVERY 8 HOURS PRN
Qty: 60 TABLET | Refills: 0 | Status: SHIPPED | OUTPATIENT
Start: 2020-02-24 | End: 2020-03-23 | Stop reason: SDUPTHER

## 2020-02-24 NOTE — PROGRESS NOTES
Subjective:       Patient ID: Erica Law is a 39 y.o. female.    Chief Complaint: Follow-up    HPI     F/u of chronic medical conditions.    htn - controlled on current meds    Jonny - controlled on prn clonazepam    HS - no abscesses or skin lesions since prior to last appt a month ago. Back on humira    predm - controlled on metformin    Pt c/o weight loss - looks like she has gained about 15 lbs in the past 2-3 months. Says she has not been eating differently and does not know why she is gaining. Not doing any diet/exercise at this moment but plans to start food diary    Review of Systems   Constitutional: Negative for chills, diaphoresis, fatigue and fever.   HENT: Negative for congestion, hearing loss, rhinorrhea, sinus pressure, sore throat, trouble swallowing and voice change.    Eyes: Negative for pain and visual disturbance.   Respiratory: Negative for cough, chest tightness, shortness of breath and wheezing.    Cardiovascular: Negative for chest pain, palpitations and leg swelling.   Gastrointestinal: Negative for abdominal pain, constipation, diarrhea, nausea and vomiting.   Endocrine: Negative for polydipsia and polyuria.   Genitourinary: Negative for decreased urine volume, dysuria, frequency and hematuria.   Musculoskeletal: Positive for arthralgias. Negative for back pain and myalgias.   Skin: Negative for color change, pallor and rash.   Neurological: Negative for dizziness, syncope, weakness, light-headedness and headaches.   Hematological: Negative for adenopathy.   Psychiatric/Behavioral: Negative for dysphoric mood and sleep disturbance. The patient is not nervous/anxious.        Objective:      Physical Exam   Constitutional: She is oriented to person, place, and time. She appears well-developed and well-nourished. No distress.   HENT:   Head: Normocephalic and atraumatic.   Nose: Nose normal.   Mouth/Throat: Oropharynx is clear and moist.   Eyes: Conjunctivae are normal.   Neck: Normal  range of motion. Neck supple. No JVD present. No thyromegaly present.   Cardiovascular: Normal rate, regular rhythm, normal heart sounds and intact distal pulses. Exam reveals no gallop and no friction rub.   No murmur heard.  Pulmonary/Chest: Effort normal and breath sounds normal. No stridor. No respiratory distress. She has no wheezes. She has no rales.   Abdominal: Soft. Bowel sounds are normal. She exhibits no distension. There is no tenderness.   Musculoskeletal: She exhibits no edema or tenderness.   Lymphadenopathy:     She has no cervical adenopathy.   Neurological: She is alert and oriented to person, place, and time.   Skin: Skin is warm and dry. No rash noted. She is not diaphoretic. No erythema. No pallor.   Psychiatric: She has a normal mood and affect. Her behavior is normal.   Vitals reviewed.      Assessment:       1. Essential hypertension    2. Prediabetes    3. Generalized anxiety disorder    4. Hidradenitis suppurativa        Plan:       Erica was seen today for follow-up.    Diagnoses and all orders for this visit:    Essential hypertension  Comments:  continue current meds    Prediabetes  Comments:  stop metformin, will give sample ozempic     Generalized anxiety disorder  -     clonazePAM (KLONOPIN) 1 MG tablet; Take 1 tablet (1 mg total) by mouth 2 (two) times daily as needed for Anxiety.    Hidradenitis suppurativa  Comments:  improved on humira  Orders:  -     traMADol (ULTRAM) 50 mg tablet; Take 1 tablet (50 mg total) by mouth every 8 (eight) hours as needed for Pain.

## 2020-03-03 RX ORDER — HYDROCHLOROTHIAZIDE 25 MG/1
25 TABLET ORAL DAILY
Qty: 90 TABLET | Refills: 1 | Status: SHIPPED | OUTPATIENT
Start: 2020-03-03 | End: 2020-09-01

## 2020-03-23 DIAGNOSIS — F41.1 GENERALIZED ANXIETY DISORDER: ICD-10-CM

## 2020-03-23 DIAGNOSIS — L73.2 HIDRADENITIS SUPPURATIVA: ICD-10-CM

## 2020-03-23 RX ORDER — CLONAZEPAM 1 MG/1
1 TABLET ORAL 2 TIMES DAILY PRN
Qty: 60 TABLET | Refills: 1 | Status: SHIPPED | OUTPATIENT
Start: 2020-03-23 | End: 2020-04-21 | Stop reason: SDUPTHER

## 2020-03-23 RX ORDER — TRAMADOL HYDROCHLORIDE 50 MG/1
50 TABLET ORAL EVERY 8 HOURS PRN
Qty: 60 TABLET | Refills: 0 | Status: SHIPPED | OUTPATIENT
Start: 2020-03-23 | End: 2020-04-21 | Stop reason: SDUPTHER

## 2020-03-25 ENCOUNTER — TELEPHONE (OUTPATIENT)
Dept: FAMILY MEDICINE | Facility: CLINIC | Age: 40
End: 2020-03-25

## 2020-03-25 NOTE — TELEPHONE ENCOUNTER
Pt called about her right breast with pain and warmth. Per LIZZIE Law we advised her that all outpatient facilities are closed. Pt was advised per LIZZIE Garcia that she would be best seen at [facility] for precaution of infection that may be causing her s/s.    I called the patient to advise. She verbalized understanding and states she would watch it closely and refer to ER when she felt she needed.

## 2020-03-26 ENCOUNTER — OFFICE VISIT (OUTPATIENT)
Dept: FAMILY MEDICINE | Facility: CLINIC | Age: 40
End: 2020-03-26
Payer: MEDICAID

## 2020-03-26 DIAGNOSIS — R73.03 PREDIABETES: ICD-10-CM

## 2020-03-26 DIAGNOSIS — E03.9 HYPOTHYROIDISM, UNSPECIFIED TYPE: ICD-10-CM

## 2020-03-26 DIAGNOSIS — L73.2 HIDRADENITIS SUPPURATIVA: Primary | ICD-10-CM

## 2020-03-26 DIAGNOSIS — E78.5 HYPERLIPIDEMIA, UNSPECIFIED HYPERLIPIDEMIA TYPE: ICD-10-CM

## 2020-03-26 DIAGNOSIS — F41.1 GENERALIZED ANXIETY DISORDER: ICD-10-CM

## 2020-03-26 PROCEDURE — 99212 PR OFFICE/OUTPT VISIT, EST, LEVL II, 10-19 MIN: ICD-10-PCS | Mod: 95,,, | Performed by: FAMILY MEDICINE

## 2020-03-26 PROCEDURE — 99212 OFFICE O/P EST SF 10 MIN: CPT | Mod: 95,,, | Performed by: FAMILY MEDICINE

## 2020-03-26 RX ORDER — DOXYCYCLINE 100 MG/1
100 CAPSULE ORAL 2 TIMES DAILY
Qty: 14 CAPSULE | Refills: 0 | Status: SHIPPED | OUTPATIENT
Start: 2020-03-26 | End: 2020-04-02

## 2020-03-26 RX ORDER — FLUCONAZOLE 150 MG/1
150 TABLET ORAL DAILY
Qty: 1 TABLET | Refills: 0 | Status: SHIPPED | OUTPATIENT
Start: 2020-03-26 | End: 2020-03-27

## 2020-03-27 ENCOUNTER — TELEPHONE (OUTPATIENT)
Dept: FAMILY MEDICINE | Facility: CLINIC | Age: 40
End: 2020-03-27

## 2020-03-27 PROBLEM — R73.03 PREDIABETES: Status: ACTIVE | Noted: 2020-03-27

## 2020-03-27 PROBLEM — F41.1 GENERALIZED ANXIETY DISORDER: Status: ACTIVE | Noted: 2020-03-27

## 2020-03-27 PROBLEM — E78.5 HYPERLIPIDEMIA: Status: ACTIVE | Noted: 2020-03-27

## 2020-03-27 PROBLEM — L73.2 HIDRADENITIS SUPPURATIVA: Status: ACTIVE | Noted: 2020-03-27

## 2020-03-27 PROBLEM — E03.9 HYPOTHYROIDISM: Status: ACTIVE | Noted: 2020-03-27

## 2020-03-27 NOTE — TELEPHONE ENCOUNTER
"Pt left a voicemail to advise she woke up with pain in her hips, back and lower back in "excrucitating" pain and wanted to know if she should go to the ER or if you would call her something in.   "

## 2020-03-27 NOTE — PROGRESS NOTES
Subjective:       Patient ID: Erica Law is a 39 y.o. female.    Chief Complaint: concerned about possible sjogren syndrome    HPI     Pt read about Sjogrens syndrome and reports that she has many features of this disease (weight gain, fatigue, joint pain, dry eyes and mouth) and would like to be tested for this. She has seen rheum in the past but does not believe this was looked into. She also had recent redness and possible abscess of breast, has been using warm compresses. No fever or chills     Review of Systems   Constitutional: Negative for chills, fatigue and fever.   Eyes: Negative for visual disturbance.   Respiratory: Negative for cough and shortness of breath.    Cardiovascular: Negative for chest pain and palpitations.   Gastrointestinal: Negative for abdominal pain, nausea and vomiting.   Genitourinary: Negative for dysuria and frequency.   Musculoskeletal: Negative for arthralgias and myalgias.   Skin: Positive for color change.   Neurological: Negative for dizziness, weakness, light-headedness and headaches.       Objective:      Physical Exam    Unable to assess - virtual visit  Assessment:       1. Hidradenitis suppurativa        Plan:       Diagnoses and all orders for this visit:    Hidradenitis suppurativa  Comments:  sent abx to pt's pharmacy  Orders:  -     doxycycline (VIBRAMYCIN) 100 MG Cap; Take 1 capsule (100 mg total) by mouth 2 (two) times daily. for 7 days  -     fluconazole (DIFLUCAN) 150 MG Tab; Take 1 tablet (150 mg total) by mouth once daily. for 1 day    counseled on her concerns and advised we can get some lab work done when covid 19 concern is improved.

## 2020-04-21 ENCOUNTER — OFFICE VISIT (OUTPATIENT)
Dept: FAMILY MEDICINE | Facility: CLINIC | Age: 40
End: 2020-04-21
Payer: MEDICAID

## 2020-04-21 DIAGNOSIS — M35.9 CONNECTIVE TISSUE DISEASE: ICD-10-CM

## 2020-04-21 DIAGNOSIS — E03.9 HYPOTHYROIDISM, UNSPECIFIED TYPE: ICD-10-CM

## 2020-04-21 DIAGNOSIS — E78.5 HYPERLIPIDEMIA, UNSPECIFIED HYPERLIPIDEMIA TYPE: ICD-10-CM

## 2020-04-21 DIAGNOSIS — R73.03 PREDIABETES: Primary | ICD-10-CM

## 2020-04-21 DIAGNOSIS — F41.1 GENERALIZED ANXIETY DISORDER: ICD-10-CM

## 2020-04-21 DIAGNOSIS — L73.2 HIDRADENITIS SUPPURATIVA: ICD-10-CM

## 2020-04-21 PROCEDURE — 99213 PR OFFICE/OUTPT VISIT, EST, LEVL III, 20-29 MIN: ICD-10-PCS | Mod: 95,,, | Performed by: FAMILY MEDICINE

## 2020-04-21 PROCEDURE — 99213 OFFICE O/P EST LOW 20 MIN: CPT | Mod: 95,,, | Performed by: FAMILY MEDICINE

## 2020-04-21 RX ORDER — TRAMADOL HYDROCHLORIDE 50 MG/1
50 TABLET ORAL EVERY 8 HOURS PRN
Qty: 60 TABLET | Refills: 0 | Status: SHIPPED | OUTPATIENT
Start: 2020-04-21 | End: 2020-05-21

## 2020-04-21 RX ORDER — CLONAZEPAM 1 MG/1
1 TABLET ORAL 2 TIMES DAILY PRN
Qty: 60 TABLET | Refills: 1 | Status: SHIPPED | OUTPATIENT
Start: 2020-04-21 | End: 2020-05-21 | Stop reason: SDUPTHER

## 2020-04-21 NOTE — PROGRESS NOTES
Subjective:       Patient ID: Erica Law is a 40 y.o. female.    Chief Complaint: f/u chronic back pain, HS, DEBBIE    HPI     The patient location is: home  The chief complaint leading to consultation is: back pain, anxiety  Visit type: audiovisual  Total time spent with patient: 20  Each patient to whom he or she provides medical services by telemedicine is:  (1) informed of the relationship between the physician and patient and the respective role of any other health care provider with respect to management of the patient; and (2) notified that he or she may decline to receive medical services by telemedicine and may withdraw from such care at any time.    Pt c/o chronic back pain as well as multiple joint pain (wrists, ankles, knees, shoulders). She is supposed to go establish with Rheum for work up at some point soon. Pt is trying to lose weight. She is currently on humira for HS. No abscesses or wounds currently.     Debbie - currently controlled on prn clonazepam. Avoids before/during driving and does not cause sedation for her    Needs lipid panel, a1c, tfts    Review of Systems   Constitutional: Negative for chills, fatigue and fever.   Eyes: Negative for visual disturbance.   Respiratory: Negative for cough, chest tightness, shortness of breath and wheezing.    Cardiovascular: Negative for chest pain, palpitations and leg swelling.   Gastrointestinal: Negative for abdominal pain, constipation, diarrhea, nausea and vomiting.   Endocrine: Negative for polydipsia and polyuria.   Genitourinary: Negative for decreased urine volume, dysuria and frequency.   Musculoskeletal: Positive for arthralgias, back pain and myalgias.   Skin: Negative for color change, pallor and rash.   Neurological: Negative for dizziness, weakness, light-headedness and headaches.   Psychiatric/Behavioral: Negative for dysphoric mood and sleep disturbance. The patient is not nervous/anxious.        Objective:      Physical Exam    No PE  - virtual visit     Assessment:       1. Prediabetes    2. Hyperlipidemia, unspecified hyperlipidemia type    3. Hypothyroidism, unspecified type    4. Generalized anxiety disorder    5. Hidradenitis suppurativa    6. Connective tissue disease        Plan:       Diagnoses and all orders for this visit:    Prediabetes  -     Comprehensive metabolic panel; Future  -     Hemoglobin A1c; Future  -     Comprehensive metabolic panel  -     Hemoglobin A1c    Hyperlipidemia, unspecified hyperlipidemia type  -     TSH; Future  -     Lipid panel; Future  -     TSH  -     Lipid panel    Hypothyroidism, unspecified type  -     T4, free; Future  -     T3, free; Future  -     T4, free  -     T3, free    Generalized anxiety disorder  -     clonazePAM (KLONOPIN) 1 MG tablet; Take 1 tablet (1 mg total) by mouth 2 (two) times daily as needed for Anxiety.    Hidradenitis suppurativa  -     CBC auto differential; Future  -     CBC auto differential  -     traMADoL (ULTRAM) 50 mg tablet; Take 1 tablet (50 mg total) by mouth every 8 (eight) hours as needed for Pain.    Connective tissue disease  -     C-reactive protein; Future  -     Sedimentation rate; Future  -     SHELLY IFA, w/Rflx to Pattern/Titer & Profile; Future  -     Rheumatoid factor; Future  -     Sjogrens syndrome-A extractable nuclear antibody; Future  -     Sjogrens syndrome-B extractable nuclear antibody; Future  -     C-reactive protein  -     Sedimentation rate  -     SHELLY IFA, w/Rflx to Pattern/Titer & Profile  -     Rheumatoid factor  -     Sjogrens syndrome-A extractable nuclear antibody  -     Sjogrens syndrome-B extractable nuclear antibody

## 2020-04-22 ENCOUNTER — PATIENT OUTREACH (OUTPATIENT)
Dept: ADMINISTRATIVE | Facility: HOSPITAL | Age: 40
End: 2020-04-22

## 2020-04-22 NOTE — PROGRESS NOTES
No outside data noted to updated Hm       Kate CHU LPN Care Coordinator  Care Coordination Department  [facility]  [phone]

## 2020-04-30 RX ORDER — IBUPROFEN 800 MG/1
TABLET ORAL
Qty: 90 TABLET | Refills: 2 | OUTPATIENT
Start: 2020-04-30

## 2020-05-06 RX ORDER — GABAPENTIN 300 MG/1
CAPSULE ORAL
Qty: 90 CAPSULE | Refills: 5 | Status: SHIPPED | OUTPATIENT
Start: 2020-05-06 | End: 2020-11-25

## 2020-05-09 DIAGNOSIS — F32.A DEPRESSION, UNSPECIFIED DEPRESSION TYPE: ICD-10-CM

## 2020-05-11 RX ORDER — ESCITALOPRAM OXALATE 20 MG/1
TABLET ORAL
Qty: 30 TABLET | Refills: 3 | Status: SHIPPED | OUTPATIENT
Start: 2020-05-11 | End: 2020-09-07

## 2020-05-12 LAB
Lab: NORMAL
T3FREE SERPL DIALY-MCNC: 2.11 PG/ML (ref 2–4.4)
T4, FREE: 0.97 NG/DL (ref 0.93–1.7)

## 2020-05-15 LAB
ABS NRBC COUNT: 0 X 10 3/UL (ref 0–0.01)
ABSOLUTE BASOPHIL: 0.05 X 10 3/UL (ref 0–0.22)
ABSOLUTE EOSINOPHIL: 0.13 X 10 3/UL (ref 0.04–0.54)
ABSOLUTE IMMATURE GRAN: 0.05 X 10 3/UL (ref 0–0.04)
ABSOLUTE LYMPHOCYTE: 3.71 X 10 3/UL (ref 0.86–4.75)
ABSOLUTE MONOCYTE: 0.58 X 10 3/UL (ref 0.22–1.08)
ALBUMIN SERPL-MCNC: 4.1 G/DL (ref 3.5–5.2)
ALBUMIN/GLOB SERPL ELPH: 1 {RATIO} (ref 1–2.7)
ALP ISOS SERPL LEV INH-CCNC: 74 U/L (ref 35–105)
ALT (SGPT): 17 U/L (ref 0–33)
ANA PATTERN: ABNORMAL
ANA SER-ACNC: POSITIVE
ANA TITER: ABNORMAL
ANION GAP SERPL CALC-SCNC: 14 MMOL/L (ref 8–17)
AST SERPL-CCNC: 23 U/L (ref 0–32)
BASOPHILS NFR BLD: 0.4 % (ref 0.2–1.2)
BILIRUBIN, TOTAL: 0.29 MG/DL (ref 0–1.2)
BUN/CREAT SERPL: 12.8 (ref 6–20)
CALCIUM SERPL-MCNC: 9.7 MG/DL (ref 8.6–10.2)
CARBON DIOXIDE, CO2: 20 MMOL/L (ref 22–29)
CHLORIDE: 106 MMOL/L (ref 98–107)
CHOLEST SERPL-MSCNC: 175 MG/DL (ref 100–200)
CREAT SERPL-MCNC: 0.78 MG/DL (ref 0.5–0.9)
CRP QUALITATIVE: POSITIVE MG/L
CRP QUANTITATIVE: 23.8 MG/L
DSDNA ANTIBODY: NORMAL
EOSINOPHIL NFR BLD: 1.1 % (ref 0.7–7)
ESTIMATED AVERAGE GLUCOSE: 153 MG/DL
GFR ESTIMATION: 82.22
GLOBULIN: 4.3 G/DL (ref 1.5–4.5)
GLUCOSE: 146 MG/DL (ref 74–106)
HBA1C MFR BLD: 7 % (ref 4–6)
HCT VFR BLD AUTO: 38.4 % (ref 37–47)
HDLC SERPL-MCNC: 57 MG/DL
HGB BLD-MCNC: 11.5 G/DL (ref 12–16)
IMMATURE GRANULOCYTES: 0.4 % (ref 0–0.5)
JO-1 AB: NEGATIVE
LDL/HDL RATIO: 1.8 (ref 1–3)
LDLC SERPL CALC-MCNC: 103 MG/DL (ref 0–100)
LYMPHOCYTES NFR BLD: 32.6 % (ref 19.3–53.1)
MCH RBC QN AUTO: 29.4 PG (ref 27–32)
MCHC RBC AUTO-ENTMCNC: 29.9 G/DL (ref 32–36)
MCV RBC AUTO: 98.2 FL (ref 82–100)
MONOCYTES NFR BLD: 5.1 % (ref 4.7–12.5)
NEUTROPHILS ABSOLUTE COUNT: 6.85 X 10 3/UL (ref 2.15–7.56)
NEUTROPHILS NFR BLD: 60.4 %
NRNP/SM IGG AUTOAB: NEGATIVE
NUCLEATED RED BLOOD CELLS: 0 /100 WBC (ref 0–0.2)
PLATELET # BLD AUTO: 251 X 10 3/UL (ref 135–400)
POTASSIUM: 5.3 MMOL/L (ref 3.5–5.1)
PROT SNV-MCNC: 8.4 G/DL (ref 6.4–8.3)
RBC # BLD AUTO: 3.91 X 10 6/UL (ref 4.2–5.4)
RDW-SD: 43.8 FL (ref 37–54)
RHEUMATOID ARTHRITIS FACTOR: NEGATIVE IU/ML
RHEUMATOID ARTHRITIS, QN/FLUID: <10 IU/ML
SCL-70 ANTIBODY: NEGATIVE
SED RATE (WESTERGREN): 110 MM/HR (ref 0–20)
SM (SMITH) IGG AUTOAB: NEGATIVE
SODIUM: 140 MMOL/L (ref 136–145)
SS-A IGG AUTOAB: NEGATIVE
SS-B IGG AUTOAB: NEGATIVE
SUB-OPTIMAL CRITERIA: NORMAL
TRIGL SERPL-MCNC: 75 MG/DL (ref 0–150)
TSH SERPL DL<=0.005 MIU/L-ACNC: 1.33 UIU/ML (ref 0.27–4.2)
URATE SERPL-MCNC: 4 MG/DL (ref 2.4–5.7)
UREA NITROGEN (BUN): 10 MG/DL (ref 6–20)
WBC # BLD: 11.37 X 10 3/UL (ref 4.3–10.8)

## 2020-05-19 ENCOUNTER — PATIENT MESSAGE (OUTPATIENT)
Dept: FAMILY MEDICINE | Facility: CLINIC | Age: 40
End: 2020-05-19

## 2020-05-21 DIAGNOSIS — F41.1 GENERALIZED ANXIETY DISORDER: ICD-10-CM

## 2020-05-22 RX ORDER — CLONAZEPAM 1 MG/1
1 TABLET ORAL 2 TIMES DAILY PRN
Qty: 60 TABLET | Refills: 1 | Status: SHIPPED | OUTPATIENT
Start: 2020-05-22 | End: 2020-06-22 | Stop reason: SDUPTHER

## 2020-05-25 ENCOUNTER — OFFICE VISIT (OUTPATIENT)
Dept: FAMILY MEDICINE | Facility: CLINIC | Age: 40
End: 2020-05-25
Payer: MEDICAID

## 2020-05-25 DIAGNOSIS — M19.90 ARTHRITIS: ICD-10-CM

## 2020-05-25 DIAGNOSIS — E11.69 TYPE 2 DIABETES MELLITUS WITH OTHER SPECIFIED COMPLICATION, WITHOUT LONG-TERM CURRENT USE OF INSULIN: ICD-10-CM

## 2020-05-25 DIAGNOSIS — L73.2 HIDRADENITIS SUPPURATIVA: Primary | ICD-10-CM

## 2020-05-25 DIAGNOSIS — F41.1 GENERALIZED ANXIETY DISORDER: ICD-10-CM

## 2020-05-25 DIAGNOSIS — R76.8 POSITIVE ANA (ANTINUCLEAR ANTIBODY): ICD-10-CM

## 2020-05-25 PROCEDURE — 99213 PR OFFICE/OUTPT VISIT, EST, LEVL III, 20-29 MIN: ICD-10-PCS | Mod: 95,,, | Performed by: FAMILY MEDICINE

## 2020-05-25 PROCEDURE — 99213 OFFICE O/P EST LOW 20 MIN: CPT | Mod: 95,,, | Performed by: FAMILY MEDICINE

## 2020-05-25 RX ORDER — TRAMADOL HYDROCHLORIDE 50 MG/1
50 TABLET ORAL EVERY 6 HOURS PRN
Qty: 60 TABLET | Refills: 0 | Status: SHIPPED | OUTPATIENT
Start: 2020-05-25 | End: 2020-06-22 | Stop reason: SDUPTHER

## 2020-05-25 RX ORDER — LEVOTHYROXINE SODIUM 50 UG/1
TABLET ORAL
Qty: 30 TABLET | Refills: 5 | Status: SHIPPED | OUTPATIENT
Start: 2020-05-25 | End: 2020-11-16

## 2020-05-25 NOTE — PROGRESS NOTES
Subjective:       Patient ID: Erica Law is a 40 y.o. female.    Chief Complaint: F/u of chronic medical conditions.    HPI     The patient location is: home  The chief complaint leading to consultation is: f/u chronic    Visit type: audiovisual    Face to Face time with patient: 15 minutes of total time spent on the encounter, which includes face to face time and non-face to face time preparing to see the patient (eg, review of tests), Obtaining and/or reviewing separately obtained history, Documenting clinical information in the electronic or other health record, Independently interpreting results (not separately reported) and communicating results to the patient/family/caregiver, or Care coordination (not separately reported).         Each patient to whom he or she provides medical services by telemedicine is:  (1) informed of the relationship between the physician and patient and the respective role of any other health care provider with respect to management of the patient; and (2) notified that he or she may decline to receive medical services by telemedicine and may withdraw from such care at any time.    Notes:   F/u of chronic medical conditions.    DM - pt was previously in the prediabetic range, increased now to 7.0. She has only been on metformin. She has gained weight. Agreeable to start weekly injectable    Anxiety is improved on lexapro, pt is agreeable to try titrating off due to weight gain.    Chronic pain/HS - pt c/o polyathraliga, labs showing +SHELLY and elevated sed rate, crp. Explained to pt these elevated inflammatory markers are not surprising due to HS. She would like to see Rheum again for another opinion.     Hypothyroidism - tfts wnl    Review of Systems   Constitutional: Positive for activity change. Negative for diaphoresis, fatigue and fever.   HENT: Negative for congestion, hearing loss, rhinorrhea and trouble swallowing.    Eyes: Positive for discharge. Negative for visual  disturbance.   Respiratory: Negative for chest tightness, shortness of breath and wheezing.    Cardiovascular: Negative for chest pain, palpitations and leg swelling.   Gastrointestinal: Negative for blood in stool, constipation, diarrhea and vomiting.   Endocrine: Negative for polydipsia and polyuria.   Genitourinary: Negative for decreased urine volume, difficulty urinating, dysuria, frequency, hematuria and menstrual problem.   Musculoskeletal: Positive for arthralgias and joint swelling. Negative for neck pain.   Skin: Negative for color change and pallor.   Neurological: Positive for weakness. Negative for dizziness, syncope, light-headedness, numbness and headaches.   Psychiatric/Behavioral: Negative for confusion, dysphoric mood and sleep disturbance. The patient is not nervous/anxious.        Objective:      Physical Exam    No PE - virtual  Assessment:       1. Hidradenitis suppurativa    2. Type 2 diabetes mellitus with other specified complication, without long-term current use of insulin    3. Generalized anxiety disorder    4. Positive SHELLY (antinuclear antibody)    5. Arthritis        Plan:       Diagnoses and all orders for this visit:    Hidradenitis suppurativa  -     Ambulatory referral/consult to Rheumatology; Future    Type 2 diabetes mellitus with other specified complication, without long-term current use of insulin  -     semaglutide (OZEMPIC) 0.25 mg or 0.5 mg(2 mg/1.5 mL) PnIj; Inject 0.5 mg into the skin every 7 days.    Generalized anxiety disorder  Comments:  decrease lexapro to 10 mg daily    Positive SHELLY (antinuclear antibody)  -     Ambulatory referral/consult to Rheumatology; Future    Arthritis  -     traMADoL (ULTRAM) 50 mg tablet; Take 1 tablet (50 mg total) by mouth every 6 (six) hours as needed for Pain.

## 2020-05-26 ENCOUNTER — TELEPHONE (OUTPATIENT)
Dept: FAMILY MEDICINE | Facility: CLINIC | Age: 40
End: 2020-05-26

## 2020-05-26 NOTE — TELEPHONE ENCOUNTER
Pt called and advised the PA was denied for the ozempic. So she is asking if you can call something else out close to it that may be covered by her insurance.

## 2020-06-16 ENCOUNTER — TELEPHONE (OUTPATIENT)
Dept: FAMILY MEDICINE | Facility: CLINIC | Age: 40
End: 2020-06-16

## 2020-06-16 NOTE — TELEPHONE ENCOUNTER
----- Message from Raeann Berman sent at 6/16/2020 11:36 AM CDT -----  Regarding: Appt Access  Contact: Pt  Pt is calling to schedule an appt for the [place] location on Monday 06/22/20. Please call back at [phone]//thank you acc

## 2020-06-17 ENCOUNTER — TELEPHONE (OUTPATIENT)
Dept: FAMILY MEDICINE | Facility: CLINIC | Age: 40
End: 2020-06-17

## 2020-06-17 NOTE — TELEPHONE ENCOUNTER
----- Message from Caro Pereira sent at 6/17/2020  1:14 PM CDT -----  Regarding: appt  Good afternoon,      Pt stated she was advised by sherman Quarles to call back to speak with her in regards to her appointment. Pt can be reached at [phone] (home)         Thanks,  Caro Pereira

## 2020-06-17 NOTE — TELEPHONE ENCOUNTER
Pt LVJOSE today to advise she has a notification from [facility] that it is time for a chest xray. She states she has an appt with the RA MD on 06/24/20. She wants to know if she should do it before or after this visit.

## 2020-06-18 ENCOUNTER — TELEPHONE (OUTPATIENT)
Dept: RHEUMATOLOGY | Facility: CLINIC | Age: 40
End: 2020-06-18

## 2020-06-18 NOTE — TELEPHONE ENCOUNTER
----- Message from Anusha Mcnally sent at 6/18/2020  2:59 PM CDT -----  Regarding: Patient  Type:  Patient Returning Call    Who Called:pt  Who Left Message for Patient:STEPHANY BANKS  Does the patient know what this is regarding?:yes  Would the patient rather a call back or a response via [facility]? Call back   Best Call Back Number: [phone] (home)   Additional Information: pt stated that she would prefer to not see Dr. Escalante

## 2020-06-18 NOTE — TELEPHONE ENCOUNTER
Spoke with Mrs. Law, patient is already an establish patient with Dr. Escalante. Appointment scheduled for 7/1/2020 @ 2:00pm

## 2020-06-18 NOTE — TELEPHONE ENCOUNTER
Left voice message stating that my name is Castro calling from  office at [facility] in [place]. We were notified that Dr. Chandra is out of office at this time and would like to schedule you with one of our Rheumatologist in [place] to continue your care.  We are offering in person visits and virtual visits if you are able to do so. Please call our clinic so we can assist you in scheduling

## 2020-06-22 ENCOUNTER — OFFICE VISIT (OUTPATIENT)
Dept: FAMILY MEDICINE | Facility: CLINIC | Age: 40
End: 2020-06-22
Payer: MEDICAID

## 2020-06-22 DIAGNOSIS — F41.1 GENERALIZED ANXIETY DISORDER: ICD-10-CM

## 2020-06-22 DIAGNOSIS — M19.90 ARTHRITIS: ICD-10-CM

## 2020-06-22 DIAGNOSIS — L73.2 HIDRADENITIS SUPPURATIVA: Primary | ICD-10-CM

## 2020-06-22 PROCEDURE — 99213 PR OFFICE/OUTPT VISIT, EST, LEVL III, 20-29 MIN: ICD-10-PCS | Mod: 95,,, | Performed by: FAMILY MEDICINE

## 2020-06-22 PROCEDURE — 99213 OFFICE O/P EST LOW 20 MIN: CPT | Mod: 95,,, | Performed by: FAMILY MEDICINE

## 2020-06-22 RX ORDER — CLONAZEPAM 1 MG/1
1 TABLET ORAL 2 TIMES DAILY PRN
Qty: 60 TABLET | Refills: 1 | Status: SHIPPED | OUTPATIENT
Start: 2020-06-22 | End: 2020-07-21 | Stop reason: SDUPTHER

## 2020-06-22 RX ORDER — TRAMADOL HYDROCHLORIDE 50 MG/1
50 TABLET ORAL EVERY 6 HOURS PRN
Qty: 60 TABLET | Refills: 0 | Status: SHIPPED | OUTPATIENT
Start: 2020-06-22 | End: 2020-07-22

## 2020-06-22 NOTE — PROGRESS NOTES
"Subjective:       Patient ID: Erica Law is a 40 y.o. female.    Chief Complaint: abscess, f/u chronic medical    HPI     The patient location is: home  The chief complaint leading to consultation is: abscess    Visit type: audiovisual    Face to Face time with patient: 12 minutes of total time spent on the encounter, which includes face to face time and non-face to face time preparing to see the patient (eg, review of tests), Obtaining and/or reviewing separately obtained history, Documenting clinical information in the electronic or other health record, Independently interpreting results (not separately reported) and communicating results to the patient/family/caregiver, or Care coordination (not separately reported).     Each patient to whom he or she provides medical services by telemedicine is:  (1) informed of the relationship between the physician and patient and the respective role of any other health care provider with respect to management of the patient; and (2) notified that he or she may decline to receive medical services by telemedicine and may withdraw from such care at any time.    Pt has noticed large round tender mass of lower abd - similar to previous abscesses that had to be drained in OR about a year ago. She says lesion "feels deep." She reports fevers 100-101F since yesterday and reports fatigue and decreased appetite.    Also needs refills of tramadol and clonazepam. Chronic pain controlled on tramadol and she does not take before or while driving, same with clonazepam. Anxiety controlled on bid prn    Review of Systems   Constitutional: Positive for appetite change, fatigue and fever. Negative for activity change and unexpected weight change.   HENT: Negative for hearing loss and rhinorrhea.    Eyes: Negative for discharge and visual disturbance.   Respiratory: Negative for chest tightness and wheezing.    Cardiovascular: Negative for chest pain, palpitations and leg swelling. "   Gastrointestinal: Negative for abdominal pain, blood in stool, constipation, diarrhea and vomiting.   Endocrine: Negative for polydipsia and polyuria.   Genitourinary: Negative for decreased urine volume, difficulty urinating, dysuria and hematuria.   Musculoskeletal: Positive for arthralgias and joint swelling. Negative for neck pain.   Neurological: Positive for weakness. Negative for syncope and headaches.   Psychiatric/Behavioral: Positive for confusion. Negative for dysphoric mood.         Objective:      Physical Exam    No PE - virtual    Assessment:       1. Hidradenitis suppurativa    2. Arthritis    3. Generalized anxiety disorder        Plan:       Diagnoses and all orders for this visit:    Hidradenitis suppurativa  Comments:  pt to report to ED immediately for eval, labs and CT. expressed understanding of potential risks of delaying or declining care. going to ED at [facility] now    Arthritis  -     traMADoL (ULTRAM) 50 mg tablet; Take 1 tablet (50 mg total) by mouth every 6 (six) hours as needed for Pain.    Generalized anxiety disorder  -     clonazePAM (KLONOPIN) 1 MG tablet; Take 1 tablet (1 mg total) by mouth 2 (two) times daily as needed for Anxiety.

## 2020-06-30 ENCOUNTER — PATIENT OUTREACH (OUTPATIENT)
Dept: ADMINISTRATIVE | Facility: OTHER | Age: 40
End: 2020-06-30

## 2020-06-30 NOTE — PROGRESS NOTES
Requested updates within Care Everywhere.  Patient's chart was reviewed for overdue MALISSA topics.  Immunizations reconciled.

## 2020-07-01 ENCOUNTER — OFFICE VISIT (OUTPATIENT)
Dept: RHEUMATOLOGY | Facility: CLINIC | Age: 40
End: 2020-07-01
Payer: MEDICAID

## 2020-07-01 ENCOUNTER — LAB VISIT (OUTPATIENT)
Dept: LAB | Facility: HOSPITAL | Age: 40
End: 2020-07-01
Attending: STUDENT IN AN ORGANIZED HEALTH CARE EDUCATION/TRAINING PROGRAM
Payer: MEDICAID

## 2020-07-01 VITALS
BODY MASS INDEX: 50.13 KG/M2 | SYSTOLIC BLOOD PRESSURE: 139 MMHG | WEIGHT: 255.31 LBS | HEART RATE: 105 BPM | DIASTOLIC BLOOD PRESSURE: 93 MMHG | HEIGHT: 60 IN

## 2020-07-01 DIAGNOSIS — R76.8 POSITIVE ANA (ANTINUCLEAR ANTIBODY): ICD-10-CM

## 2020-07-01 DIAGNOSIS — L73.2 HIDRADENITIS SUPPURATIVA: ICD-10-CM

## 2020-07-01 DIAGNOSIS — M25.50 ARTHRALGIA, UNSPECIFIED JOINT: Primary | ICD-10-CM

## 2020-07-01 LAB
BASOPHILS # BLD AUTO: 0.06 K/UL (ref 0–0.2)
BASOPHILS NFR BLD: 0.4 % (ref 0–1.9)
DIFFERENTIAL METHOD: ABNORMAL
EOSINOPHIL # BLD AUTO: 0.1 K/UL (ref 0–0.5)
EOSINOPHIL NFR BLD: 0.7 % (ref 0–8)
ERYTHROCYTE [DISTWIDTH] IN BLOOD BY AUTOMATED COUNT: 12.6 % (ref 11.5–14.5)
ERYTHROCYTE [SEDIMENTATION RATE] IN BLOOD BY WESTERGREN METHOD: 101 MM/HR (ref 0–36)
HCT VFR BLD AUTO: 37.3 % (ref 37–48.5)
HGB BLD-MCNC: 11.2 G/DL (ref 12–16)
IMM GRANULOCYTES # BLD AUTO: 0.17 K/UL (ref 0–0.04)
IMM GRANULOCYTES NFR BLD AUTO: 1.2 % (ref 0–0.5)
LYMPHOCYTES # BLD AUTO: 3.3 K/UL (ref 1–4.8)
LYMPHOCYTES NFR BLD: 22.2 % (ref 18–48)
MCH RBC QN AUTO: 29.9 PG (ref 27–31)
MCHC RBC AUTO-ENTMCNC: 30 G/DL (ref 32–36)
MCV RBC AUTO: 100 FL (ref 82–98)
MONOCYTES # BLD AUTO: 0.9 K/UL (ref 0.3–1)
MONOCYTES NFR BLD: 6.1 % (ref 4–15)
NEUTROPHILS # BLD AUTO: 10.2 K/UL (ref 1.8–7.7)
NEUTROPHILS NFR BLD: 69.4 % (ref 38–73)
NRBC BLD-RTO: 0 /100 WBC
PLATELET # BLD AUTO: 314 K/UL (ref 150–350)
PMV BLD AUTO: 10.4 FL (ref 9.2–12.9)
RBC # BLD AUTO: 3.75 M/UL (ref 4–5.4)
WBC # BLD AUTO: 14.65 K/UL (ref 3.9–12.7)

## 2020-07-01 PROCEDURE — 86200 CCP ANTIBODY: CPT

## 2020-07-01 PROCEDURE — 36415 COLL VENOUS BLD VENIPUNCTURE: CPT

## 2020-07-01 PROCEDURE — 86038 ANTINUCLEAR ANTIBODIES: CPT

## 2020-07-01 PROCEDURE — 86225 DNA ANTIBODY NATIVE: CPT

## 2020-07-01 PROCEDURE — 83516 IMMUNOASSAY NONANTIBODY: CPT

## 2020-07-01 PROCEDURE — 86160 COMPLEMENT ANTIGEN: CPT

## 2020-07-01 PROCEDURE — 99214 OFFICE O/P EST MOD 30 MIN: CPT | Mod: PBBFAC | Performed by: STUDENT IN AN ORGANIZED HEALTH CARE EDUCATION/TRAINING PROGRAM

## 2020-07-01 PROCEDURE — 99999 PR PBB SHADOW E&M-EST. PATIENT-LVL IV: ICD-10-PCS | Mod: PBBFAC,,, | Performed by: STUDENT IN AN ORGANIZED HEALTH CARE EDUCATION/TRAINING PROGRAM

## 2020-07-01 PROCEDURE — 80053 COMPREHEN METABOLIC PANEL: CPT

## 2020-07-01 PROCEDURE — 99213 PR OFFICE/OUTPT VISIT, EST, LEVL III, 20-29 MIN: ICD-10-PCS | Mod: S$PBB,,, | Performed by: STUDENT IN AN ORGANIZED HEALTH CARE EDUCATION/TRAINING PROGRAM

## 2020-07-01 PROCEDURE — 85025 COMPLETE CBC W/AUTO DIFF WBC: CPT

## 2020-07-01 PROCEDURE — 86160 COMPLEMENT ANTIGEN: CPT | Mod: 59

## 2020-07-01 PROCEDURE — 99213 OFFICE O/P EST LOW 20 MIN: CPT | Mod: S$PBB,,, | Performed by: STUDENT IN AN ORGANIZED HEALTH CARE EDUCATION/TRAINING PROGRAM

## 2020-07-01 PROCEDURE — 86235 NUCLEAR ANTIGEN ANTIBODY: CPT | Mod: 59

## 2020-07-01 PROCEDURE — 85652 RBC SED RATE AUTOMATED: CPT

## 2020-07-01 PROCEDURE — 99999 PR PBB SHADOW E&M-EST. PATIENT-LVL IV: CPT | Mod: PBBFAC,,, | Performed by: STUDENT IN AN ORGANIZED HEALTH CARE EDUCATION/TRAINING PROGRAM

## 2020-07-01 PROCEDURE — 86140 C-REACTIVE PROTEIN: CPT

## 2020-07-01 PROCEDURE — 86039 ANTINUCLEAR ANTIBODIES (ANA): CPT

## 2020-07-01 RX ORDER — BETAMETHASONE SODIUM PHOSPHATE AND BETAMETHASONE ACETATE 3; 3 MG/ML; MG/ML
6 INJECTION, SUSPENSION INTRA-ARTICULAR; INTRALESIONAL; INTRAMUSCULAR; SOFT TISSUE
Status: COMPLETED | OUTPATIENT
Start: 2020-07-01 | End: 2020-07-01

## 2020-07-01 RX ORDER — HYDROCODONE BITARTRATE AND ACETAMINOPHEN 7.5; 325 MG/1; MG/1
TABLET ORAL
COMMUNITY
Start: 2019-05-24 | End: 2020-09-22

## 2020-07-01 RX ORDER — CLINDAMYCIN HYDROCHLORIDE 150 MG/1
450 CAPSULE ORAL
COMMUNITY
Start: 2020-06-22 | End: 2020-09-22

## 2020-07-01 RX ADMIN — BETAMETHASONE ACETATE AND BETAMETHASONE SODIUM PHOSPHATE 6 MG: 3; 3 INJECTION, SUSPENSION INTRA-ARTICULAR; INTRALESIONAL; INTRAMUSCULAR; SOFT TISSUE at 03:07

## 2020-07-01 NOTE — PROGRESS NOTES
Administered 1cc Betamethasone  6mg/cc to Left Ventrogluteal. Pt tolerated well. No acute reaction noted to site. Pt instructed on S/S to report. Pt verbalized understanding. Patient waited 15 minutes post injection    Lot:[id number]  Exp:2/23/2021  Manu:[employer]

## 2020-07-01 NOTE — LETTER
July 6, 2020      Jill Palacios MD  [address]  [address]  [place] [address]           [facility]  [address] [facility] [address]  Phone: [phone]  Fax: [phone]          Patient: Erica Law   MR Number: [id number]   YOB: 1980   Date of Visit: 7/1/2020       Dear Dr. Jill Palacios:    Thank you for referring Erica Law to me for evaluation. Attached you will find relevant portions of my assessment and plan of care.    If you have questions, please do not hesitate to call me. I look forward to following Erica Law along with you.    Sincerely,    Heather Escalante MD    Enclosure  CC:  No Recipients    If you would like to receive this communication electronically, please contact [email or web address] or [phone] to request more information on [employer] access.    For providers and/or their staff who would like to refer a patient to [facility], please contact us through our one-stop-shop provider referral line, [facility], [place], at [phone].    If you feel you have received this communication in error or would no longer like to receive these types of communications, please e-mail [email or web address]

## 2020-07-02 LAB
ALBUMIN SERPL BCP-MCNC: 3.3 G/DL (ref 3.5–5.2)
ALP SERPL-CCNC: 76 U/L (ref 55–135)
ALT SERPL W/O P-5'-P-CCNC: 12 U/L (ref 10–44)
ANION GAP SERPL CALC-SCNC: 12 MMOL/L (ref 8–16)
AST SERPL-CCNC: 22 U/L (ref 10–40)
BILIRUB SERPL-MCNC: 0.4 MG/DL (ref 0.1–1)
BUN SERPL-MCNC: 12 MG/DL (ref 6–20)
C3 SERPL-MCNC: 239 MG/DL (ref 50–180)
C4 SERPL-MCNC: 26 MG/DL (ref 11–44)
CALCIUM SERPL-MCNC: 10.4 MG/DL (ref 8.7–10.5)
CCP AB SER IA-ACNC: 1 U/ML
CHLORIDE SERPL-SCNC: 104 MMOL/L (ref 95–110)
CO2 SERPL-SCNC: 17 MMOL/L (ref 23–29)
CREAT SERPL-MCNC: 0.9 MG/DL (ref 0.5–1.4)
CRP SERPL-MCNC: 132.3 MG/L (ref 0–8.2)
EST. GFR  (AFRICAN AMERICAN): >60 ML/MIN/1.73 M^2
EST. GFR  (NON AFRICAN AMERICAN): >60 ML/MIN/1.73 M^2
GLUCOSE SERPL-MCNC: 139 MG/DL (ref 70–110)
POTASSIUM SERPL-SCNC: 4.8 MMOL/L (ref 3.5–5.1)
PROT SERPL-MCNC: 10.2 G/DL (ref 6–8.4)
SODIUM SERPL-SCNC: 133 MMOL/L (ref 136–145)

## 2020-07-05 LAB — HISTONE IGG SER IA-ACNC: 0.4 UNITS (ref 0–0.9)

## 2020-07-06 LAB
ANA PATTERN 1: NORMAL
ANA SER QL IF: POSITIVE
ANA TITR SER IF: NORMAL {TITER}
DSDNA AB SER-ACNC: NORMAL [IU]/ML

## 2020-07-06 NOTE — PROGRESS NOTES
RHEUMATOLOGY OUTPATIENT CLINIC NOTE       Attending Rheumatologist: Heather Escalante  Primary Care Provider: Jill Palacios MD   Physician Requesting Consultation: Jill Palacios MD  [address] [facility]  [address]  [place] [address]  Chief Complaint/Reason For Consultation:  Positive SHELLY    Subjective:       HPI  Historical:  Erica Law is a 40 y.o.  AAF presents for evaluation of elevated inflammatory markers. CRP 45 per outside records. Has recurrent hx of hidradenitis suppurtiva. Not followed by dermatology. No hx pso/psa/SpA. No hx dactylitis, uveitis, achilles tendinitis. Does have some episodic lower back pain, no relief w nsaids. No improvement w activity. +Improvement w rest. +Occasional nightime awakening. No alternating buttock pain. No hx photosensitivity, raynauds, oral ulcers, hairloss, swollen joints , frothy urine.     Main complaint is bl knee pain, mainly suprapatellar. Also episodic, currently 3/10. Hurts mostly at night. No associated swelling. Not taking anything for pain relief. Also co bilateral hip pain, hurts when laying on side    Today:  Pt presents for evaluation positive SHELLY. Previously seen c/o polyarthralgias, which pt states are persistent. Has been started on humira per derm for HS. States improvement in axillary areas but worsening in groin. Reports worsening joint pains in mcp/pips w episodic swelling. +Fatigue. +Sicca symptoms.    Pt denied any hairloss, vision changes,raynauds,  rash, muscle weakness, muscle pain, hoarseness, sob, weightloss, night sweats, chills, fevers, N/V/D, chest pain, frothy urine.    14pt ros negative except as otherwise stated above    Review of Systems   Constitutional: Negative for fever, malaise/fatigue and weight loss.   HENT: Negative for congestion, hearing loss and sore throat.    Eyes: Negative for blurred vision, pain and redness.   Respiratory: Negative for cough, sputum production and shortness of breath.     Cardiovascular: Negative for chest pain, orthopnea and claudication.   Gastrointestinal: Negative for abdominal pain, blood in stool, diarrhea, heartburn and vomiting.   Genitourinary: Negative for dysuria and frequency.   Musculoskeletal: Positive for joint pain. Negative for back pain, myalgias and neck pain.   Skin: Negative for itching and rash.   Neurological: Negative for dizziness, focal weakness and weakness.   Endo/Heme/Allergies: Negative for polydipsia. Does not bruise/bleed easily.   Psychiatric/Behavioral: Negative for depression and hallucinations.   All other systems reviewed and are negative.      Chronic comorbid conditions affecting medical decision making today:  Past Medical History:   Diagnosis Date    Anxiety     Diabetes mellitus     Hydradenitis     Hypertension     Thyroid disease      Past Surgical History:   Procedure Laterality Date     SECTION       Family History   Problem Relation Age of Onset    Diabetes Mellitus Mother     Cancer Maternal Grandmother     Cancer Maternal Grandfather      Social History     Substance and Sexual Activity   Alcohol Use Not Currently     Social History     Tobacco Use   Smoking Status Current Every Day Smoker    Types: Vaping with nicotine   Smokeless Tobacco Never Used   Tobacco Comment    pt on medications to stop smoking      Social History     Substance and Sexual Activity   Drug Use Not on file       Current Outpatient Medications:     albuterol (PROVENTIL/VENTOLIN HFA) 90 mcg/actuation inhaler, Inhale 2 puffs into the lungs every 6 (six) hours as needed for Wheezing. Rescue, Disp: 18 g, Rfl: 0    atorvastatin (LIPITOR) 40 MG tablet, TAKE 1 TABLET BY MOUTH EVERY DAY, Disp: 30 tablet, Rfl: 5    clindamycin (CLEOCIN) 150 MG capsule, 450 mg., Disp: , Rfl:     clonazePAM (KLONOPIN) 1 MG tablet, Take 1 tablet (1 mg total) by mouth 2 (two) times daily as needed for Anxiety., Disp: 60 tablet, Rfl: 1    diclofenac sodium (VOLTAREN) 1  % Gel, Apply 2 grams topically 4 (four) times daily., Disp: 1 Tube, Rfl: 5    ENGERIX-B, PF, 20 mcg/mL Syrg, TO BE ADMINISTERED BY PHARMACIST FOR IMMUNIZATION, Disp: 1 Syringe, Rfl: 0    escitalopram oxalate (LEXAPRO) 20 MG tablet, TAKE 1 TABLET BY MOUTH EVERY DAY, Disp: 30 tablet, Rfl: 3    ferrous sulfate 324 mg (65 mg iron) TbEC, Take 324 mg by mouth 2 (two) times daily., Disp: , Rfl: 1    gabapentin (NEURONTIN) 300 MG capsule, TAKE 1 CAPSULE BY MOUTH THREE TIMES A DAY, Disp: 90 capsule, Rfl: 5    HUMIRA,CF, PEN CROHNS-UC-HS 80 mg/0.8 mL PnKt, INJECT 160 MG (2 PENS) SUBCUTANEOUSLY ON DAY 1, THEN 80 MG SUBCUTANEOUSLY ON DAY 15, THEN SWITCH TO MAINTENANCE DOSE, Disp: , Rfl: 0    hydroCHLOROthiazide (HYDRODIURIL) 25 MG tablet, Take 1 tablet (25 mg total) by mouth once daily., Disp: 90 tablet, Rfl: 1    HYDROcodone-acetaminophen (NORCO) 7.5-325 mg per tablet, Every 4 Hours, Disp: , Rfl:     ibuprofen (ADVIL,MOTRIN) 800 MG tablet, TAKE 1 TABLET BY MOUTH 3 TIMES A DAY AS NEEDED FOR PAIN, Disp: 90 tablet, Rfl: 2    iron-vit c-vit b12-folic acid (IRON-C PLUS) tablet, Take 1 tablet by mouth 2 (two) times a day., Disp: 180 tablet, Rfl: 1    levothyroxine (SYNTHROID) 50 MCG tablet, TAKE 1 TABLET BY MOUTH BEFORE BREAKFAST, Disp: 30 tablet, Rfl: 5    medroxyPROGESTERone (DEPO-PROVERA) 150 mg/mL injection, , Disp: , Rfl:     metFORMIN (GLUCOPHAGE) 500 MG tablet, TAKE 1 TABLET BY MOUTH TWICE A DAY WITH MEALS, Disp: 60 tablet, Rfl: 5    mupirocin (BACTROBAN) 2 % ointment, Apply topically 3 (three) times daily., Disp: 1 Tube, Rfl: 5    nicotine (NICODERM CQ) 7 mg/24 hr, Place 1 patch onto the skin once daily., Disp: 28 patch, Rfl: 2    nicotine polacrilex (NICORETTE) 4 MG Gum, CHEW 1 GUM BY MOUTH EVERY 4 TO 6 HOURS AS NEEDED, Disp: 100 each, Rfl: 0    nicotine polacrilex 4 MG Lozg, Take 1 lozenge (4 mg total) by mouth every 4 to 6 hours as needed., Disp: 72 lozenge, Rfl: 0    spironolactone (ALDACTONE) 100 MG  tablet, , Disp: , Rfl:     traMADoL (ULTRAM) 50 mg tablet, Take 1 tablet (50 mg total) by mouth every 6 (six) hours as needed for Pain., Disp: 60 tablet, Rfl: 0       Objective:         Vitals:    07/01/20 1435   BP: (!) 139/93   Pulse: 105     Physical Exam   Constitutional: She is oriented to person, place, and time and well-developed, well-nourished, and in no distress. No distress.   HENT:   Head: Normocephalic and atraumatic.   Eyes: Conjunctivae and EOM are normal. Pupils are equal, round, and reactive to light.   Neck: Normal range of motion. Neck supple.   Cardiovascular: Normal rate and normal heart sounds.    Pulmonary/Chest: Effort normal and breath sounds normal.   Abdominal: Soft. Bowel sounds are normal. She exhibits no distension.   Neurological: She is alert and oriented to person, place, and time. Gait normal.   Skin: Skin is warm and dry. No erythema.     Psychiatric: Affect and judgment normal.   Musculoskeletal: Normal range of motion. She exhibits no edema, tenderness or deformity.   No synovitis or effusions over large joints. No tenderness on palpation mcps or lateral squeeze. No nodules or tophi       Reviewed old and all outside pertinent medical records available.    All lab results personally reviewed and interpreted by me.  Lab Results   Component Value Date    WBC 14.65 (H) 07/01/2020    HGB 11.2 (L) 07/01/2020    HCT 37.3 07/01/2020     (H) 07/01/2020    MCH 29.9 07/01/2020    MCHC 30.0 (L) 07/01/2020    RDW 12.6 07/01/2020     07/01/2020    MPV 10.4 07/01/2020    NEUTROABS 6.85 05/12/2020       Lab Results   Component Value Date     (L) 07/01/2020    K 4.8 07/01/2020     07/01/2020    CO2 17 (L) 07/01/2020     (H) 07/01/2020    BUN 12 07/01/2020    CALCIUM 10.4 07/01/2020    PROT 10.2 (H) 07/01/2020    ALBUMIN 3.3 (L) 07/01/2020    BILITOT 0.4 07/01/2020    AST 22 07/01/2020    ALKPHOS 76 07/01/2020    ALT 12 07/01/2020       Lab Results   Component  Value Date    COLORU Yellow 07/01/2020    APPEARANCEUA Clear 07/01/2020    SPECGRAV 1.010 07/01/2020    PHUR 6.0 07/01/2020    PROTEINUA Trace (A) 07/01/2020    KETONESU Negative 07/01/2020    LEUKOCYTESUR Negative 07/01/2020    NITRITE Negative 07/01/2020    UROBILINOGEN NORMAL 10/21/2019       Lab Results   Component Value Date    .3 (H) 07/01/2020       Lab Results   Component Value Date    SEDRATE 101 (H) 07/01/2020       Lab Results   Component Value Date    SHELLY POSITIVE (A) 05/12/2020    SEDRATE 101 (H) 07/01/2020       No components found for: 25OHVITDTOT, 49LELAUH8, 58LWEKMZ6, METHODNOTE    Lab Results   Component Value Date    URICACID 4.0 05/12/2020       No components found for: TSPOTTB     SHELLY-  HLAB27-    Knee XR  Joint spaces are maintained.  No marginal spurring.  No fracture or dislocation.  Possible bilateral effusions.    Hip XR  Overlying bowel gas limits evaluation of the lower sacrum.  Hip joints are symmetric in appearance.  No fracture or dislocation.  Pelvic ring is intact.    XR SI  The sacroiliac joints are intact with symmetric appearance.  No areas of erosion or sclerosis are identified.  No evidence of ankylosis.  No acute osseous abnormality.   ASSESSMENT / PLAN:     Erica Law is a 40 y.o. Black or  female with:      #Positive SHELLY  -Hx hidradenitis suppurtiva, on humira. Now c/o polyarthralgias mainly in small joints of hands. C/w inflammatory pattern. Will complete workup as below for further evaluation underlying CTD vs drug induced. No synovitis.   -Celestone IMx1    - ANTI-HISTONE ANTIBODY; Future  - SHELLY Screen w/Reflex; Future  - Cyclic Citrullinated Peptide Antibody, IgG; Future  - Anti-DNA antibody, double-stranded; Standing  - Comprehensive metabolic panel; Future  - CBC auto differential; Future  - Sedimentation rate; Future  - C-Reactive Protein; Future  - C3 complement; Standing  - C4 complement; Standing  - Urinalysis; Standing  - Protein /  creatinine ratio, urine; Future      rtc 2 wks      No follow-ups on file.    Method of contact with patient concerns: MyChart attn Rheumatology      Heather Escalante M.D.  Rheumatology Department   [facility] [place] [address], [place] [address]  Phone: [phone]  Fax: [phone]

## 2020-07-08 LAB
ANTI SM ANTIBODY: 0.11 RATIO (ref 0–0.99)
ANTI SM/RNP ANTIBODY: 0.33 RATIO (ref 0–0.99)
ANTI-SM INTERPRETATION: NEGATIVE
ANTI-SM/RNP INTERPRETATION: NEGATIVE
ANTI-SSA ANTIBODY: 0.07 RATIO (ref 0–0.99)
ANTI-SSA INTERPRETATION: NEGATIVE
ANTI-SSB ANTIBODY: 0.07 RATIO (ref 0–0.99)
ANTI-SSB INTERPRETATION: NEGATIVE
DSDNA AB SER-ACNC: NORMAL [IU]/ML

## 2020-07-14 ENCOUNTER — TELEPHONE (OUTPATIENT)
Dept: RHEUMATOLOGY | Facility: CLINIC | Age: 40
End: 2020-07-14

## 2020-07-14 ENCOUNTER — OFFICE VISIT (OUTPATIENT)
Dept: RHEUMATOLOGY | Facility: CLINIC | Age: 40
End: 2020-07-14
Payer: MEDICAID

## 2020-07-14 DIAGNOSIS — L73.2 HIDRADENITIS SUPPURATIVA: ICD-10-CM

## 2020-07-14 DIAGNOSIS — M06.4 UNDIFFERENTIATED INFLAMMATORY ARTHRITIS: ICD-10-CM

## 2020-07-14 DIAGNOSIS — R76.8 POSITIVE ANA (ANTINUCLEAR ANTIBODY): Primary | ICD-10-CM

## 2020-07-14 PROBLEM — M19.90 UNDIFFERENTIATED INFLAMMATORY ARTHRITIS: Status: ACTIVE | Noted: 2020-07-14

## 2020-07-14 PROCEDURE — 99215 OFFICE O/P EST HI 40 MIN: CPT | Mod: 95,,, | Performed by: INTERNAL MEDICINE

## 2020-07-14 PROCEDURE — 99215 PR OFFICE/OUTPT VISIT, EST, LEVL V, 40-54 MIN: ICD-10-PCS | Mod: 95,,, | Performed by: INTERNAL MEDICINE

## 2020-07-14 RX ORDER — PREDNISONE 10 MG/1
10 TABLET ORAL DAILY
Qty: 30 TABLET | Refills: 2 | Status: SHIPPED | OUTPATIENT
Start: 2020-07-14 | End: 2020-09-28

## 2020-07-14 NOTE — ASSESSMENT & PLAN NOTE
She has inflammatory arthritis with significantly elevated inflammatory markers.  Differential diagnosis includes rheumatoid arthritis, lupus, psoriatic arthritis, undifferentiated inflammatory arthritis or postviral inflammatory arthritis.  No clear episode of viral infection prior to onset of symptoms which has been for the past 3 months.  CCP and rheumatoid factor negative and she was on Humira when she developed these arthritidis makes rheumatoid arthritis and psoriatic arthritis very tests likely.  Lupus is still in the differential but also very unlikely with negative MARGY and negative anti histone antibody along with no evidence of photosensitive malar rash, sicca syndrome, Raynaud's phenomenon.  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  Start prednisone.  Check further serologies and atypical antibody panel.

## 2020-07-14 NOTE — PROGRESS NOTES
RHEUMATOLOGY FOLLOW UP - TELE VISIT     The patient location is: [place]  The chief complaint leading to consultation is: Worsening joint pain  Visit type: Virtual visit with synchronous audio and video  Total time spent with patient: 20 minutes  Each patient to whom he or she provides medical services by telemedicine is:  (1) informed of the relationship between the physician and patient and the respective role of any other health care provider with respect to management of the patient; and (2) notified that he or she may decline to receive medical services by telemedicine and may withdraw from such care at any time.    HPI:-  Erica Borjas a 40 y.o. pleasant female seen today through My chart video visit for follow up.  She was recently seen by my colleague for inflammatory arthritis and elevated inflammatory markers she has hydradenitis suppurative on Humira therapy well controlled.  Three months ago she started having severe inflammatory pain over her hands and feet associated with severe swelling.  No photosensitive malar rash, sicca syndrome or Raynaud's phenomenon.  Her hydradenitis suppurative a has been well controlled on Humira.  Her inflammatory markers were significantly elevated and was referred to see my colleague.  She was given Celestone injection and was undergoing further workup.  Today I saw her since she called in with severely worsening inflammation.  She complains of stiffness lasting all day long, swelling over multiple joints.    Review of Systems   Constitutional: Negative for chills and fever.   HENT: Negative for congestion and sore throat.    Eyes: Negative for blurred vision and redness.   Respiratory: Negative for cough and shortness of breath.    Cardiovascular: Negative for chest pain and leg swelling.   Gastrointestinal: Negative for abdominal pain.   Genitourinary: Negative for dysuria.   Musculoskeletal: Positive for back pain and joint pain. Negative for falls, myalgias and  neck pain.   Skin: Negative for rash.   Neurological: Negative for headaches.   Endo/Heme/Allergies: Does not bruise/bleed easily.   Psychiatric/Behavioral: Negative for memory loss. The patient does not have insomnia.        Past Medical History:   Diagnosis Date    Anxiety     Diabetes mellitus     Hydradenitis     Hypertension     Thyroid disease        Past Surgical History:   Procedure Laterality Date     SECTION          Social History     Tobacco Use    Smoking status: Current Every Day Smoker     Types: Vaping with nicotine    Smokeless tobacco: Never Used    Tobacco comment: pt on medications to stop smoking    Substance Use Topics    Alcohol use: Not Currently    Drug use: Not on file       Family History   Problem Relation Age of Onset    Diabetes Mellitus Mother     Cancer Maternal Grandmother     Cancer Maternal Grandfather        Review of patient's allergies indicates:  No Known Allergies    Physical exam:-    GEN: awake, alert, non-diaphoretic, no psychomotor agitation, no acute distress    HEENT :Head: atraumatic, normocephalic, no rashes noted, no lesions noted;    Eyes: NO redness, discharge, swelling, or lesions    Nose: NO redness, swelling, discharge, deformity, or impetigo/crusting    Skin: no lesions, wounds, erythema, or cyanosis noted on face or hands    Cardiopulmonary: no increased respiratory effort, speaking in clear sentences    MSK: normal ROM in the neck, Upper extremities, Lower extremities  Reduced ROM of hands, fist formation 80% and significant swelling of hands on inspection    Good ambulation in front of the camera    Neuro: cranial nerves grossly normal, speech normal rate and rhythm, orientation arrived to appointment on time with no prompting, moved both upper extremities equally    Pysch:  appearance, behavior, and attitude- well groomed, pleasant, cooperative    Medication List with Changes/Refills   New Medications    PREDNISONE (DELTASONE) 10 MG  TABLET    Take 1 tablet (10 mg total) by mouth once daily.   Current Medications    ALBUTEROL (PROVENTIL/VENTOLIN HFA) 90 MCG/ACTUATION INHALER    Inhale 2 puffs into the lungs every 6 (six) hours as needed for Wheezing. Rescue    ATORVASTATIN (LIPITOR) 40 MG TABLET    TAKE 1 TABLET BY MOUTH EVERY DAY    CLINDAMYCIN (CLEOCIN) 150 MG CAPSULE    450 mg.    CLONAZEPAM (KLONOPIN) 1 MG TABLET    Take 1 tablet (1 mg total) by mouth 2 (two) times daily as needed for Anxiety.    DICLOFENAC SODIUM (VOLTAREN) 1 % GEL    Apply 2 grams topically 4 (four) times daily.    ENGERIX-B, PF, 20 MCG/ML SYRG    TO BE ADMINISTERED BY PHARMACIST FOR IMMUNIZATION    ESCITALOPRAM OXALATE (LEXAPRO) 20 MG TABLET    TAKE 1 TABLET BY MOUTH EVERY DAY    FERROUS SULFATE 324 MG (65 MG IRON) TBEC    Take 324 mg by mouth 2 (two) times daily.    GABAPENTIN (NEURONTIN) 300 MG CAPSULE    TAKE 1 CAPSULE BY MOUTH THREE TIMES A DAY    DERIAN CLIFFORD, PEN CROHNS-UC-HS 80 MG/0.8 ML PNKT    INJECT 160 MG (2 PENS) SUBCUTANEOUSLY ON DAY 1, THEN 80 MG SUBCUTANEOUSLY ON DAY 15, THEN SWITCH TO MAINTENANCE DOSE    HYDROCHLOROTHIAZIDE (HYDRODIURIL) 25 MG TABLET    Take 1 tablet (25 mg total) by mouth once daily.    HYDROCODONE-ACETAMINOPHEN (NORCO) 7.5-325 MG PER TABLET    Every 4 Hours    IBUPROFEN (ADVIL,MOTRIN) 800 MG TABLET    TAKE 1 TABLET BY MOUTH 3 TIMES A DAY AS NEEDED FOR PAIN    IRON-VIT C-VIT B12-FOLIC ACID (IRON-C PLUS) TABLET    Take 1 tablet by mouth 2 (two) times a day.    LEVOTHYROXINE (SYNTHROID) 50 MCG TABLET    TAKE 1 TABLET BY MOUTH BEFORE BREAKFAST    MEDROXYPROGESTERONE (DEPO-PROVERA) 150 MG/ML INJECTION        METFORMIN (GLUCOPHAGE) 500 MG TABLET    TAKE 1 TABLET BY MOUTH TWICE A DAY WITH MEALS    MUPIROCIN (BACTROBAN) 2 % OINTMENT    Apply topically 3 (three) times daily.    NICOTINE (NICODERM CQ) 7 MG/24 HR    Place 1 patch onto the skin once daily.    NICOTINE POLACRILEX (NICORETTE) 4 MG GUM    CHEW 1 GUM BY MOUTH EVERY 4 TO 6 HOURS AS NEEDED     NICOTINE POLACRILEX 4 MG LOZG    Take 1 lozenge (4 mg total) by mouth every 4 to 6 hours as needed.    SPIRONOLACTONE (ALDACTONE) 100 MG TABLET        TRAMADOL (ULTRAM) 50 MG TABLET    Take 1 tablet (50 mg total) by mouth every 6 (six) hours as needed for Pain.       Assessment/Plans:-  1. Positive SHELLY (antinuclear antibody)    2. Hidradenitis suppurativa    3. Undifferentiated inflammatory arthritis      Problem List Items Addressed This Visit     Hidradenitis suppurativa    Current Assessment & Plan     Stable on Humira.  Continue the same.  Continue follow-up with Dermatology         Positive SHELLY (antinuclear antibody) - Primary    Current Assessment & Plan     Low titer positive SHELLY with negative MARGY.  Severe inflammatory polyarthritis with significantly elevated inflammatory marker.  But negative MARGY and negative anti histone antibody reduces chances of drug-induced lupus.  No photosensitive malar rash or other symptoms of lupus other than elevated inflammatory marker and polyarthritis.  Check ANCA panel.         Relevant Orders    COVID-19 (SARS CoV-2) IgG Antibody    Protein electrophoresis, serum    Immunofixation electrophoresis    Sedimentation rate    C-Reactive Protein    Undifferentiated inflammatory arthritis    Current Assessment & Plan     She has inflammatory arthritis with significantly elevated inflammatory markers.  Differential diagnosis includes rheumatoid arthritis, lupus, psoriatic arthritis, undifferentiated inflammatory arthritis or postviral inflammatory arthritis.  No clear episode of viral infection prior to onset of symptoms which has been for the past 3 months.  CCP and rheumatoid factor negative and she was on Humira when she developed these arthritidis makes rheumatoid arthritis and psoriatic arthritis very tests likely.  Lupus is still in the differential but also very unlikely with negative MARGY and negative anti histone antibody along with no evidence of photosensitive malar  rash, sicca syndrome, Raynaud's phenomenon.  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  Start prednisone.  Check further serologies and atypical antibody panel.         Relevant Medications    predniSONE (DELTASONE) 10 MG tablet    Other Relevant Orders    COVID-19 (SARS CoV-2) IgG Antibody    Protein electrophoresis, serum    Immunofixation electrophoresis    Sedimentation rate    C-Reactive Protein    Anti-neutrophilic cytoplasmic antibody    Proteinase 3 Autoantibodies    Myeloperoxidase Antibody (MPO)          Follow up in about 4 weeks (around 8/11/2020).    Disclaimer: This note was prepared using voice recognition system and is likely to have sound alike errors and is not proof read.  Please call me with any questions.

## 2020-07-14 NOTE — TELEPHONE ENCOUNTER
----- Message from Willi Fall MD sent at 7/14/2020  2:08 PM CDT -----  Labs at [place].  My chart follow-up visit in 4 weeks.

## 2020-07-14 NOTE — ASSESSMENT & PLAN NOTE
Low titer positive SHELLY with negative MARGY.  Severe inflammatory polyarthritis with significantly elevated inflammatory marker.  But negative MARGY and negative anti histone antibody reduces chances of drug-induced lupus.  No photosensitive malar rash or other symptoms of lupus other than elevated inflammatory marker and polyarthritis.  Check ANCA panel.

## 2020-07-15 ENCOUNTER — PATIENT MESSAGE (OUTPATIENT)
Dept: RHEUMATOLOGY | Facility: CLINIC | Age: 40
End: 2020-07-15

## 2020-07-15 NOTE — TELEPHONE ENCOUNTER
Spoke with pt and scheduled virtual visit for 8.12.20 at 5.15 pm. Pt would like to have labs done at [facility]  Will fax over [email or web address]. [address] [place] [address]  [phone]  [phone]  Alt. fax [phone]

## 2020-07-20 DIAGNOSIS — M19.90 ARTHRITIS: ICD-10-CM

## 2020-07-20 RX ORDER — TRAMADOL HYDROCHLORIDE 50 MG/1
50 TABLET ORAL EVERY 6 HOURS PRN
Qty: 60 TABLET | Refills: 0 | OUTPATIENT
Start: 2020-07-20 | End: 2020-08-19

## 2020-07-20 RX ORDER — TRAMADOL HYDROCHLORIDE 50 MG/1
TABLET ORAL
Qty: 60 TABLET | Refills: 0 | OUTPATIENT
Start: 2020-07-20

## 2020-07-21 ENCOUNTER — OFFICE VISIT (OUTPATIENT)
Dept: FAMILY MEDICINE | Facility: CLINIC | Age: 40
End: 2020-07-21
Payer: MEDICAID

## 2020-07-21 DIAGNOSIS — F41.1 GENERALIZED ANXIETY DISORDER: Primary | ICD-10-CM

## 2020-07-21 DIAGNOSIS — M35.9 CONNECTIVE TISSUE DISEASE: ICD-10-CM

## 2020-07-21 PROCEDURE — 99213 PR OFFICE/OUTPT VISIT, EST, LEVL III, 20-29 MIN: ICD-10-PCS | Mod: 95,,, | Performed by: FAMILY MEDICINE

## 2020-07-21 PROCEDURE — 99213 OFFICE O/P EST LOW 20 MIN: CPT | Mod: 95,,, | Performed by: FAMILY MEDICINE

## 2020-07-21 RX ORDER — CLONAZEPAM 1 MG/1
1 TABLET ORAL 2 TIMES DAILY PRN
Qty: 60 TABLET | Refills: 1 | Status: SHIPPED | OUTPATIENT
Start: 2020-07-21 | End: 2020-10-19 | Stop reason: SDUPTHER

## 2020-07-21 NOTE — PROGRESS NOTES
Subjective:       Patient ID: Erica Law is a 40 y.o. female.    Chief Complaint: chronic pain, anxiety    HPI     The patient location is: home  The chief complaint leading to consultation is: chronic pain and anxiety    Visit type: audiovisual    Face to Face time with patient: 13 minutes of total time spent on the encounter, which includes face to face time and non-face to face time preparing to see the patient (eg, review of tests), Obtaining and/or reviewing separately obtained history, Documenting clinical information in the electronic or other health record, Independently interpreting results (not separately reported) and communicating results to the patient/family/caregiver, or Care coordination (not separately reported).     Each patient to whom he or she provides medical services by telemedicine is:  (1) informed of the relationship between the physician and patient and the respective role of any other health care provider with respect to management of the patient; and (2) notified that he or she may decline to receive medical services by telemedicine and may withdraw from such care at any time.    Jonny - controlled on clonazepam prn    Pt was recently started on prednisone for connective tissue disease and reports much improvement in pain    She did request a refill of her tramadol but she also filled two other opiate rx (for I&D from ED) so I did inform her I cannot fill it for her at this time    Review of Systems   Constitutional: Negative for activity change and unexpected weight change.   HENT: Negative for hearing loss, rhinorrhea and trouble swallowing.    Eyes: Negative for discharge and visual disturbance.   Respiratory: Negative for chest tightness and wheezing.    Cardiovascular: Negative for chest pain and palpitations.   Gastrointestinal: Negative for blood in stool, constipation, diarrhea and vomiting.   Endocrine: Negative for polydipsia and polyuria.   Genitourinary: Negative for  difficulty urinating, dysuria, hematuria and menstrual problem.   Musculoskeletal: Positive for arthralgias. Negative for joint swelling and neck pain.   Neurological: Negative for weakness and headaches.   Psychiatric/Behavioral: Negative for confusion and dysphoric mood.         Objective:      Physical Exam    virtual  Assessment:       1. Generalized anxiety disorder    2. Connective tissue disease        Plan:       Diagnoses and all orders for this visit:    Generalized anxiety disorder  -     clonazePAM (KLONOPIN) 1 MG tablet; Take 1 tablet (1 mg total) by mouth 2 (two) times daily as needed for Anxiety.    Connective tissue disease  Comments:  following with rheum, started on steroids, counseled on opiates and can fill her tramadol a month after last rx filled. (8/3)

## 2020-07-29 ENCOUNTER — PATIENT MESSAGE (OUTPATIENT)
Dept: RHEUMATOLOGY | Facility: CLINIC | Age: 40
End: 2020-07-29

## 2020-07-30 NOTE — TELEPHONE ENCOUNTER
Dr. Fall I spoke with Bety at [facility] in [place] she stated that she will fax over Mrs. Law lab results today.

## 2020-07-30 NOTE — TELEPHONE ENCOUNTER
Please call the external lab and have the lab results faxed to us as soon as possible.   Regards

## 2020-08-04 RX ORDER — TRAMADOL HYDROCHLORIDE 50 MG/1
50 TABLET ORAL EVERY 8 HOURS PRN
Qty: 60 TABLET | Refills: 0 | Status: SHIPPED | OUTPATIENT
Start: 2020-08-04 | End: 2020-09-03

## 2020-08-06 ENCOUNTER — PATIENT MESSAGE (OUTPATIENT)
Dept: RHEUMATOLOGY | Facility: CLINIC | Age: 40
End: 2020-08-06

## 2020-08-06 DIAGNOSIS — R76.8 POSITIVE ANA (ANTINUCLEAR ANTIBODY): Primary | ICD-10-CM

## 2020-08-06 NOTE — TELEPHONE ENCOUNTER
I am still waiting on SPEP/ROCAEL. I called her and explained all labs so far are negative. Please check on SPEP / ROCAEL and call me.

## 2020-08-06 NOTE — TELEPHONE ENCOUNTER
Pt requesting test results, scanned in under media tab on 7.30.20. Pt is upset because she requested results last week and has not received results review from office.

## 2020-08-07 NOTE — TELEPHONE ENCOUNTER
Spoke with pt, advised info below, lab orders faxed to [facility] in [place]- [phone], pt Verbalized understanding, pt stated she is out of town now, stated she will go Monday and call before she go to see if they take medicaid, Verbalized understanding.

## 2020-08-07 NOTE — TELEPHONE ENCOUNTER
Thanks.  All her labs have been negative so far.  Possible that she has underlying myositis.  Please schedule the labs ordered today that is CK, aldolase and Lopez marker panel at  [facility] as soon as possible.  Thanks.

## 2020-08-12 ENCOUNTER — PATIENT OUTREACH (OUTPATIENT)
Dept: ADMINISTRATIVE | Facility: OTHER | Age: 40
End: 2020-08-12

## 2020-08-12 DIAGNOSIS — R70.0 ELEVATED SED RATE: Primary | ICD-10-CM

## 2020-08-12 DIAGNOSIS — R79.82 ELEVATED C-REACTIVE PROTEIN (CRP): ICD-10-CM

## 2020-08-13 ENCOUNTER — OFFICE VISIT (OUTPATIENT)
Dept: HEMATOLOGY/ONCOLOGY | Facility: CLINIC | Age: 40
End: 2020-08-13
Payer: MEDICAID

## 2020-08-13 DIAGNOSIS — R70.0 ELEVATED SED RATE: ICD-10-CM

## 2020-08-13 DIAGNOSIS — R76.8 POSITIVE ANA (ANTINUCLEAR ANTIBODY): Primary | ICD-10-CM

## 2020-08-13 DIAGNOSIS — R79.82 ELEVATED C-REACTIVE PROTEIN (CRP): ICD-10-CM

## 2020-08-13 DIAGNOSIS — E66.01 MORBIDLY OBESE: ICD-10-CM

## 2020-08-13 DIAGNOSIS — M06.4 UNDIFFERENTIATED INFLAMMATORY ARTHRITIS: ICD-10-CM

## 2020-08-13 PROCEDURE — 99203 OFFICE O/P NEW LOW 30 MIN: CPT | Mod: 95,,, | Performed by: INTERNAL MEDICINE

## 2020-08-13 PROCEDURE — 99203 PR OFFICE/OUTPT VISIT, NEW, LEVL III, 30-44 MIN: ICD-10-PCS | Mod: 95,,, | Performed by: INTERNAL MEDICINE

## 2020-08-13 NOTE — PROGRESS NOTES
Subjective:       Patient ID: Erica Law is a 40 y.o. female.    Chief Complaint: Results (Elevated sed rate)    HPI 40-year-old female referred for evaluation of elevated sed rate being followed by Rheumatology with undifferentiated connective tissue disorder.  Seen in video consultation    Past Medical History:   Diagnosis Date    Anxiety     Diabetes mellitus     Hydradenitis     Hypertension     Thyroid disease      Family History   Problem Relation Age of Onset    Diabetes Mellitus Mother     Cancer Maternal Grandmother     Cancer Maternal Grandfather      Social History     Socioeconomic History    Marital status: Single     Spouse name: Not on file    Number of children: 1    Years of education: Not on file    Highest education level: Not on file   Occupational History    Not on file   Social Needs    Financial resource strain: Not on file    Food insecurity     Worry: Not on file     Inability: Not on file    Transportation needs     Medical: Not on file     Non-medical: Not on file   Tobacco Use    Smoking status: Current Every Day Smoker     Types: Vaping with nicotine    Smokeless tobacco: Never Used    Tobacco comment: pt on medications to stop smoking    Substance and Sexual Activity    Alcohol use: Not Currently    Drug use: Not on file    Sexual activity: Not on file   Lifestyle    Physical activity     Days per week: Not on file     Minutes per session: Not on file    Stress: Not on file   Relationships    Social connections     Talks on phone: Not on file     Gets together: Not on file     Attends Worship service: Not on file     Active member of club or organization: Not on file     Attends meetings of clubs or organizations: Not on file     Relationship status: Not on file   Other Topics Concern    Not on file   Social History Narrative    Not on file     Past Surgical History:   Procedure Laterality Date     SECTION         Labs:  Lab Results    Component Value Date    WBC 14.65 (H) 07/01/2020    HGB 11.2 (L) 07/01/2020    HCT 37.3 07/01/2020     (H) 07/01/2020     07/01/2020     BMP  Lab Results   Component Value Date     (L) 07/01/2020    K 4.8 07/01/2020     07/01/2020    CO2 17 (L) 07/01/2020    BUN 12 07/01/2020    CREATININE 0.9 07/01/2020    CALCIUM 10.4 07/01/2020    ANIONGAP 12 07/01/2020    ESTGFRAFRICA >60.0 07/01/2020    EGFRNONAA >60.0 07/01/2020     Lab Results   Component Value Date    ALT 12 07/01/2020    AST 22 07/01/2020    ALKPHOS 76 07/01/2020    BILITOT 0.4 07/01/2020       No results found for: IRON, TIBC, FERRITIN, SATURATEDIRO  No results found for: [id number]  No results found for: FOLATE  Lab Results   Component Value Date    TSH 1.33 05/12/2020         Review of Systems   Psychiatric/Behavioral: The patient is nervous/anxious.        Objective:      Physical Exam  Psychiatric:         Mood and Affect: Mood is anxious.             Assessment:      1. Positive SHELLY (antinuclear antibody)    2. Elevated sed rate    3. Elevated C-reactive protein (CRP)    4. Morbidly obese    5. Undifferentiated inflammatory arthritis           Plan:     The patient location is: HOME  The chief complaint leading to consultation is:  Elevated sedimentation rate    Visit type:  Synchronous audio and video    Face to Face time with patient: 30 minutes of total time spent on the encounter, which includes face to face time and non-face to face time preparing to see the patient (eg, review of tests), Obtaining and/or reviewing separately obtained history, Documenting clinical information in the electronic or other health record, Independently interpreting results (not separately reported) and communicating results to the patient/family/caregiver, or Care coordination (not separately reported).         Each patient to whom he or she provides medical services by telemedicine is:  (1) informed of the relationship between the  physician and patient and the respective role of any other health care provider with respect to management of the patient; and (2) notified that he or she may decline to receive medical services by telemedicine and may withdraw from such care at any time.    Notes:   Reviewed patient's records undifferentiated connective tissue disorder.  Followed by Rheumatology.  I have explained to the patient that elevated sed rates are not specific in origin she has slight anemia will proceed with laboratory review on my part and will see back again for video consultation.  Will try to have labs performed within [facility] better coordination of care and discussion with her        Boni Connors Jr, MD FACP

## 2020-08-13 NOTE — LETTER
August 13, 2020      Willi Fall MD  [address] [facility] [place] [address]           [facility]  [address] [place] [address]  Phone: [phone]  Fax: [phone]          Patient: Erica Law   MR Number: [id number]   YOB: 1980   Date of Visit: 8/13/2020       Dear Dr. Willi Fall:    Thank you for referring Erica Law to me for evaluation. Attached you will find relevant portions of my assessment and plan of care.    If you have questions, please do not hesitate to call me. I look forward to following Erica Law along with you.    Sincerely,    Boni Connors MD    Enclosure  CC:  No Recipients    If you would like to receive this communication electronically, please contact [email or web address] or [phone] to request more information on [email or web address] Link access.    For providers and/or their staff who would like to refer a patient to [facility], please contact us through our one-stop-shop provider referral line, [facility], at [phone].    If you feel you have received this communication in error or would no longer like to receive these types of communications, please e-mail [email or web address]

## 2020-08-20 ENCOUNTER — OFFICE VISIT (OUTPATIENT)
Dept: FAMILY MEDICINE | Facility: CLINIC | Age: 40
End: 2020-08-20
Payer: MEDICAID

## 2020-08-20 VITALS
DIASTOLIC BLOOD PRESSURE: 82 MMHG | OXYGEN SATURATION: 96 % | SYSTOLIC BLOOD PRESSURE: 130 MMHG | HEART RATE: 104 BPM | TEMPERATURE: 99 F | BODY MASS INDEX: 49.78 KG/M2 | WEIGHT: 254.88 LBS

## 2020-08-20 DIAGNOSIS — L02.91 ABSCESS: ICD-10-CM

## 2020-08-20 DIAGNOSIS — Z20.822 EXPOSURE TO COVID-19 VIRUS: ICD-10-CM

## 2020-08-20 DIAGNOSIS — J06.9 UPPER RESPIRATORY TRACT INFECTION, UNSPECIFIED TYPE: Primary | ICD-10-CM

## 2020-08-20 PROCEDURE — 99213 OFFICE O/P EST LOW 20 MIN: CPT | Mod: S$GLB,,, | Performed by: FAMILY MEDICINE

## 2020-08-20 PROCEDURE — 99213 PR OFFICE/OUTPT VISIT, EST, LEVL III, 20-29 MIN: ICD-10-PCS | Mod: S$GLB,,, | Performed by: FAMILY MEDICINE

## 2020-08-20 RX ORDER — AZITHROMYCIN 250 MG/1
TABLET, FILM COATED ORAL
Qty: 6 TABLET | Refills: 0 | Status: SHIPPED | OUTPATIENT
Start: 2020-08-20 | End: 2020-08-20 | Stop reason: SDUPTHER

## 2020-08-20 RX ORDER — DOXYCYCLINE 100 MG/1
100 CAPSULE ORAL 2 TIMES DAILY
Qty: 14 CAPSULE | Refills: 0 | Status: SHIPPED | OUTPATIENT
Start: 2020-08-20 | End: 2020-08-27

## 2020-08-20 RX ORDER — AZITHROMYCIN 250 MG/1
TABLET, FILM COATED ORAL
Qty: 6 TABLET | Refills: 0 | Status: SHIPPED | OUTPATIENT
Start: 2020-08-20 | End: 2020-08-20

## 2020-08-20 NOTE — PROGRESS NOTES
Subjective:       Patient ID: Erica Law is a 40 y.o. female.    Chief Complaint: Recurrent Skin Infections (under the abd; had lanced 2 mths ago and feel it is flaring up. ), Sore Throat, and Cough    HPI     HS - c/o recurrent abscesses of lower abd/groin area - she would like to est with general surgery. She has been on humira for a few weeks then recently restarted. Denies fever    C/o cough, congestion, fatigue and sore throat for several days. Pt thought she could not had covid as she had negative antibody test recently. She does agree to go get tested today    Review of Systems   Constitutional: Negative for chills, fatigue and fever.   HENT: Positive for nasal congestion, rhinorrhea and sore throat.    Eyes: Negative for visual disturbance.   Respiratory: Positive for cough. Negative for chest tightness, shortness of breath and wheezing.    Cardiovascular: Negative for chest pain, palpitations and leg swelling.   Gastrointestinal: Negative for abdominal pain, nausea and vomiting.   Endocrine: Negative for polydipsia and polyuria.   Genitourinary: Negative for decreased urine volume, dysuria and frequency.   Musculoskeletal: Negative for arthralgias and myalgias.   Integumentary:  Negative for rash.   Neurological: Negative for dizziness, weakness, light-headedness and headaches.   Psychiatric/Behavioral: Negative for dysphoric mood and sleep disturbance. The patient is not nervous/anxious.          Objective:      Physical Exam  Vitals signs reviewed.   Constitutional:       General: She is not in acute distress.     Appearance: She is well-developed. She is not diaphoretic.   HENT:      Head: Normocephalic and atraumatic.      Nose: Nose normal.   Eyes:      Conjunctiva/sclera: Conjunctivae normal.   Neck:      Musculoskeletal: Normal range of motion and neck supple.   Cardiovascular:      Rate and Rhythm: Normal rate and regular rhythm.      Heart sounds: Normal heart sounds. No murmur. No friction  rub. No gallop.    Pulmonary:      Effort: Pulmonary effort is normal. No respiratory distress.      Breath sounds: Normal breath sounds. No stridor. No wheezing, rhonchi or rales.   Musculoskeletal:      Right lower leg: No edema.      Left lower leg: No edema.   Skin:     General: Skin is warm and dry.      Coloration: Skin is not pale.   Neurological:      Mental Status: She is alert and oriented to person, place, and time.   Psychiatric:         Mood and Affect: Mood normal.         Behavior: Behavior normal.         Assessment:       1. Upper respiratory tract infection, unspecified type    2. Abscess    3. Exposure to Covid-19 Virus        Plan:       Erica was seen today for recurrent skin infections, sore throat and cough.    Diagnoses and all orders for this visit:    Upper respiratory tract infection, unspecified type  -     Discontinue: azithromycin (Z-ALPA) 250 MG tablet; Take 2 tablets once then 1 tablet daily for 4 days  -     Discontinue: azithromycin (Z-ALPA) 250 MG tablet; Take 2 tablets once then 1 tablet daily for 4 days    Abscess  -     doxycycline (VIBRAMYCIN) 100 MG Cap; Take 1 capsule (100 mg total) by mouth 2 (two) times daily. for 7 days  -     Ambulatory referral/consult to General Surgery; Future    Exposure to Covid-19 Virus  -     COVID-19 Routine Screening; Future  -     COVID-19 Routine Screening

## 2020-08-22 LAB
SARS COV SOURCE: NORMAL
SARS-COV-2 RNA RESP QL NAA+PROBE: NOT DETECTED

## 2020-08-24 ENCOUNTER — TELEPHONE (OUTPATIENT)
Dept: FAMILY MEDICINE | Facility: CLINIC | Age: 40
End: 2020-08-24

## 2020-08-24 NOTE — TELEPHONE ENCOUNTER
Pt advised to .     ----- Message from Sukhdev Mccallum sent at 8/24/2020  2:46 PM CDT -----  Contact: self  Type:  Patient Returning Call    Who Called:pt  Who Left Message for Patient:n/a  Does the patient know what this is regarding?:no  Would the patient rather a call back or a response via [facility]? Call back  Best Call Back Number:[phone]  Additional Information: pt wants to know if paperwork for handicapped tag is completed

## 2020-08-25 LAB
ALDOLASE SERPL-CCNC: 3 U/L (ref 3.3–10.3)
CK SERPL-CCNC: 67 U/L (ref 32–182)
EJ AB SER QL: NEGATIVE
ENA JO1 AB SER IA-ACNC: <20 UNITS
ENA PM/SCL AB SER-ACNC: <20 UNITS
ENA SS-A 52KD IGG SER IA-ACNC: <20 UNITS
FIBRILLARIN AB SER QL: NEGATIVE
KU AB SER QL: NEGATIVE
MDA5 AB SER LINE BLOT-ACNC: <20 UNITS
MI2 AB SER QL: NEGATIVE
MJ AB SER LINE BLOT-ACNC: <20 UNITS
OJ AB SER QL: NEGATIVE
PL12 AB SER QL: NEGATIVE
PL7 AB SER QL: NEGATIVE
SRP AB SERPL QL: NEGATIVE
TIF1-GAMMA AB SER LINE BLOT-ACNC: <20 UNITS
U1 SNRNP AB SER IA-ACNC: <20 UNITS
U2 SNRNP AB SER QL: NEGATIVE

## 2020-09-01 ENCOUNTER — OFFICE VISIT (OUTPATIENT)
Dept: HEMATOLOGY/ONCOLOGY | Facility: CLINIC | Age: 40
End: 2020-09-01
Payer: MEDICAID

## 2020-09-01 DIAGNOSIS — E66.01 MORBIDLY OBESE: ICD-10-CM

## 2020-09-01 DIAGNOSIS — M06.4 UNDIFFERENTIATED INFLAMMATORY ARTHRITIS: ICD-10-CM

## 2020-09-01 DIAGNOSIS — R76.8 POSITIVE ANA (ANTINUCLEAR ANTIBODY): Primary | ICD-10-CM

## 2020-09-01 PROCEDURE — 99213 OFFICE O/P EST LOW 20 MIN: CPT | Mod: 95,,, | Performed by: INTERNAL MEDICINE

## 2020-09-01 PROCEDURE — 99213 PR OFFICE/OUTPT VISIT, EST, LEVL III, 20-29 MIN: ICD-10-PCS | Mod: 95,,, | Performed by: INTERNAL MEDICINE
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
## 2020-09-02 ENCOUNTER — TELEPHONE (OUTPATIENT)
Dept: RHEUMATOLOGY | Facility: CLINIC | Age: 40
End: 2020-09-02

## 2020-09-02 ENCOUNTER — OFFICE VISIT (OUTPATIENT)
Dept: RHEUMATOLOGY | Facility: CLINIC | Age: 40
End: 2020-09-02
Payer: MEDICAID

## 2020-09-02 DIAGNOSIS — M06.09 RHEUMATOID ARTHRITIS OF MULTIPLE SITES WITH NEGATIVE RHEUMATOID FACTOR: ICD-10-CM

## 2020-09-02 DIAGNOSIS — R76.8 POSITIVE ANA (ANTINUCLEAR ANTIBODY): ICD-10-CM

## 2020-09-02 DIAGNOSIS — R79.82 ELEVATED C-REACTIVE PROTEIN (CRP): ICD-10-CM

## 2020-09-02 DIAGNOSIS — L73.2 HIDRADENITIS SUPPURATIVA: Primary | ICD-10-CM

## 2020-09-02 DIAGNOSIS — R70.0 ELEVATED SED RATE: ICD-10-CM

## 2020-09-02 PROCEDURE — 99215 OFFICE O/P EST HI 40 MIN: CPT | Mod: 95,,, | Performed by: INTERNAL MEDICINE

## 2020-09-02 PROCEDURE — 99215 PR OFFICE/OUTPT VISIT, EST, LEVL V, 40-54 MIN: ICD-10-PCS | Mod: 95,,, | Performed by: INTERNAL MEDICINE

## 2020-09-02 NOTE — TELEPHONE ENCOUNTER
----- Message from Willi Fall MD sent at 9/2/2020  5:43 PM CDT -----  [place] appointment this week. Follow up with me in 6 weeks.

## 2020-09-05 NOTE — PROGRESS NOTES
RHEUMATOLOGY FOLLOW UP - TELE VISIT     The patient location is: [place]  The chief complaint leading to consultation is: Joint pain  Visit type: Virtual visit with synchronous audio and video  Total time spent with patient:  15 minutes  Each patient to whom he or she provides medical services by telemedicine is:  (1) informed of the relationship between the physician and patient and the respective role of any other health care provider with respect to management of the patient; and (2) notified that he or she may decline to receive medical services by telemedicine and may withdraw from such care at any time.    HPI:-  Erica Borjas a 40 y.o. pleasant female seen today through My chart video visit for follow up.  She has longstanding history of hidradenitis suppurativa.  She is on Humira once weekly therapy.  In the last several months she has noticed worsening lesions under her armpits and groin associated with past.  She has seen her dermatologist recently and was advised to continue Humira.  Joint pain improved significantly since being on prednisone therapy.  Morning stiffness lasts less than 30 min.  No swelling of small joints of hands or feet.  Pain was primarily restricted to the large joint which has been improving since being on prednisone.    Review of Systems   Constitutional: Negative for chills and fever.   HENT: Negative for congestion and sore throat.    Eyes: Negative for blurred vision and redness.   Respiratory: Negative for cough and shortness of breath.    Cardiovascular: Negative for chest pain and leg swelling.   Gastrointestinal: Negative for abdominal pain.   Genitourinary: Negative for dysuria.   Musculoskeletal: Positive for back pain, joint pain, myalgias and neck pain. Negative for falls.   Skin: Negative for rash.   Neurological: Negative for headaches.   Endo/Heme/Allergies: Does not bruise/bleed easily.   Psychiatric/Behavioral: Negative for memory loss. The patient does not have  insomnia.        Past Medical History:   Diagnosis Date    Anxiety     Diabetes mellitus     Hydradenitis     Hypertension     Thyroid disease        Past Surgical History:   Procedure Laterality Date     SECTION          Social History     Tobacco Use    Smoking status: Current Every Day Smoker     Types: Vaping with nicotine    Smokeless tobacco: Never Used    Tobacco comment: pt on medications to stop smoking    Substance Use Topics    Alcohol use: Not Currently    Drug use: Not on file       Family History   Problem Relation Age of Onset    Diabetes Mellitus Mother     Cancer Maternal Grandmother     Cancer Maternal Grandfather        Review of patient's allergies indicates:  No Known Allergies    Physical exam:-    GEN: awake, alert, non-diaphoretic, no psychomotor agitation, no acute distress    HEENT :Head: atraumatic, normocephalic, no rashes noted, no lesions noted;    Eyes: NO redness, discharge, swelling, or lesions    Nose: NO redness, swelling, discharge, deformity, or impetigo/crusting    Skin: no lesions, wounds, erythema, or cyanosis noted on face or hands    Cardiopulmonary: no increased respiratory effort, speaking in clear sentences    MSK: normal ROM in the neck, Upper extremities, Lower extremities  Good ROM of hands, fist formation 100% and , no obvious synovitis    Good ambulation in front of the camera    Neuro: cranial nerves grossly normal, speech normal rate and rhythm, orientation arrived to appointment on time with no prompting, moved both upper extremities equally    Pysch:  appearance, behavior, and attitude- well groomed, pleasant, cooperative    Medication List with Changes/Refills   Current Medications    ALBUTEROL (PROVENTIL/VENTOLIN HFA) 90 MCG/ACTUATION INHALER    Inhale 2 puffs into the lungs every 6 (six) hours as needed for Wheezing. Rescue    ATORVASTATIN (LIPITOR) 40 MG TABLET    TAKE 1 TABLET BY MOUTH EVERY DAY    CLINDAMYCIN (CLEOCIN) 150 MG CAPSULE     450 mg.    CLONAZEPAM (KLONOPIN) 1 MG TABLET    Take 1 tablet (1 mg total) by mouth 2 (two) times daily as needed for Anxiety.    DICLOFENAC SODIUM (VOLTAREN) 1 % GEL    Apply 2 grams topically 4 (four) times daily.    ENGERIX-B, PF, 20 MCG/ML SYRG    TO BE ADMINISTERED BY PHARMACIST FOR IMMUNIZATION    ESCITALOPRAM OXALATE (LEXAPRO) 20 MG TABLET    TAKE 1 TABLET BY MOUTH EVERY DAY    FERROUS SULFATE 324 MG (65 MG IRON) TBEC    Take 324 mg by mouth 2 (two) times daily.    GABAPENTIN (NEURONTIN) 300 MG CAPSULE    TAKE 1 CAPSULE BY MOUTH THREE TIMES A DAY    DERIAN CLIFFORD, PEN CROHNS-[facility]-HS 80 MG/0.8 ML PNKT    INJECT 160 MG (2 PENS) SUBCUTANEOUSLY ON DAY 1, THEN 80 MG SUBCUTANEOUSLY ON DAY 15, THEN SWITCH TO MAINTENANCE DOSE    HYDROCHLOROTHIAZIDE (HYDRODIURIL) 25 MG TABLET    TAKE 1 TABLET BY MOUTH EVERY DAY    HYDROCODONE-ACETAMINOPHEN (NORCO) 7.5-325 MG PER TABLET    Every 4 Hours    IBUPROFEN (ADVIL,MOTRIN) 800 MG TABLET    TAKE 1 TABLET BY MOUTH 3 TIMES A DAY AS NEEDED FOR PAIN    LEVOTHYROXINE (SYNTHROID) 50 MCG TABLET    TAKE 1 TABLET BY MOUTH BEFORE BREAKFAST    MEDROXYPROGESTERONE (DEPO-PROVERA) 150 MG/ML INJECTION        METFORMIN (GLUCOPHAGE) 500 MG TABLET    TAKE 1 TABLET BY MOUTH TWICE A DAY WITH MEALS    MUPIROCIN (BACTROBAN) 2 % OINTMENT    Apply topically 3 (three) times daily.    NICOTINE (NICODERM CQ) 7 MG/24 HR    Place 1 patch onto the skin once daily.    NICOTINE POLACRILEX (NICORETTE) 4 MG GUM    CHEW 1 GUM BY MOUTH EVERY 4 TO 6 HOURS AS NEEDED    NICOTINE POLACRILEX 4 MG LOZG    Take 1 lozenge (4 mg total) by mouth every 4 to 6 hours as needed.    PREDNISONE (DELTASONE) 10 MG TABLET    Take 1 tablet (10 mg total) by mouth once daily.    SPIRONOLACTONE (ALDACTONE) 100 MG TABLET           Assessment/Plans:-  1. Hidradenitis suppurativa    2. Positive SHELLY (antinuclear antibody)    3. Elevated C-reactive protein (CRP)    4. Elevated sed rate    5. Rheumatoid arthritis of multiple sites with negative  rheumatoid factor      · Worsening hidradenitis suppurativa on Humira every week.  Active lesions present.  Advised to schedule appointment with dermatologist to discuss alternative therapy.  This could be the reason for elevated inflammatory markers.  · Polyarthralgia primarily involving large joints with elevated inflammatory markers raises possibility of underlying rheumatoid arthritis.  But she is already on Humira.  I thing the joint pain is likely secondary to osteoarthritis and active hidradenitis suppurativa rather than inflammatory arthritis.  Monitor.  · Continue prednisone until evaluated by dermatologist for worsening hidradenitis on TNF alpha therapy.  Problem List Items Addressed This Visit     Hidradenitis suppurativa - Primary    Relevant Orders    Ambulatory referral/consult to Dermatology    Positive SHELLY (antinuclear antibody)    Rheumatoid arthritis of multiple sites with negative rheumatoid factor    Elevated sed rate    Elevated C-reactive protein (CRP)          Follow up in about 6 weeks (around 10/14/2020).    Disclaimer: This note was prepared using voice recognition system and is likely to have sound alike errors and is not proof read.  Please call me with any questions.

## 2020-09-07 RX ORDER — TRAMADOL HYDROCHLORIDE 50 MG/1
50 TABLET ORAL EVERY 6 HOURS PRN
Qty: 60 TABLET | Refills: 0 | Status: SHIPPED | OUTPATIENT
Start: 2020-09-07 | End: 2020-10-07

## 2020-09-17 NOTE — TELEPHONE ENCOUNTER
Spoke with pt and scheduled virtual visit with Dr. JIMÉNEZ for 10.12.20 at 5.15 pm. Pt verbalized understanding

## 2020-09-18 ENCOUNTER — PATIENT MESSAGE (OUTPATIENT)
Dept: FAMILY MEDICINE | Facility: CLINIC | Age: 40
End: 2020-09-18

## 2020-09-18 DIAGNOSIS — F41.1 GENERALIZED ANXIETY DISORDER: ICD-10-CM

## 2020-09-22 ENCOUNTER — OFFICE VISIT (OUTPATIENT)
Dept: FAMILY MEDICINE | Facility: CLINIC | Age: 40
End: 2020-09-22
Payer: MEDICAID

## 2020-09-22 DIAGNOSIS — L70.9 ACNE, UNSPECIFIED ACNE TYPE: ICD-10-CM

## 2020-09-22 DIAGNOSIS — L73.2 HIDRADENITIS SUPPURATIVA: Primary | ICD-10-CM

## 2020-09-22 PROCEDURE — 99213 PR OFFICE/OUTPT VISIT, EST, LEVL III, 20-29 MIN: ICD-10-PCS | Mod: 95,,, | Performed by: FAMILY MEDICINE

## 2020-09-22 PROCEDURE — 99213 OFFICE O/P EST LOW 20 MIN: CPT | Mod: 95,,, | Performed by: FAMILY MEDICINE

## 2020-09-22 RX ORDER — CLINDAMYCIN HYDROCHLORIDE 300 MG/1
300 CAPSULE ORAL EVERY 6 HOURS
Qty: 28 CAPSULE | Refills: 0 | Status: SHIPPED | OUTPATIENT
Start: 2020-09-22 | End: 2020-09-29

## 2020-09-22 NOTE — PROGRESS NOTES
Subjective:       Patient ID: Erica Law is a 40 y.o. female.    Chief Complaint: abscess    HPI   The patient location is: [place], [place]  The chief complaint leading to consultation is: abscess    Visit type: audiovisual    Face to Face time with patient: 12 minutes of total time spent on the encounter, which includes face to face time and non-face to face time preparing to see the patient (eg, review of tests), Obtaining and/or reviewing separately obtained history, Documenting clinical information in the electronic or other health record, Independently interpreting results (not separately reported) and communicating results to the patient/family/caregiver, or Care coordination (not separately reported).     Each patient to whom he or she provides medical services by telemedicine is:  (1) informed of the relationship between the physician and patient and the respective role of any other health care provider with respect to management of the patient; and (2) notified that he or she may decline to receive medical services by telemedicine and may withdraw from such care at any time.    Pt has noted a genital abscess - similar to previous lesions. +purulent drainage. She has been using warm compresses. No fever chills, abd pain, n/v. Lesion is mildly tender. She also would like refill on benzoyl peroxide cream that derm gave her as this helped with her acne. Anxiety is controlled on current med    Review of Systems   Constitutional: Negative for activity change and unexpected weight change.   HENT: Negative for hearing loss, rhinorrhea and trouble swallowing.    Eyes: Negative for discharge and visual disturbance.   Respiratory: Negative for chest tightness and wheezing.    Cardiovascular: Negative for chest pain and palpitations.   Gastrointestinal: Negative for blood in stool, constipation, diarrhea and vomiting.   Endocrine: Negative for polydipsia and polyuria.   Genitourinary: Negative for difficulty  urinating, dysuria, hematuria and menstrual problem.   Musculoskeletal: Negative for arthralgias, joint swelling and neck pain.   Integumentary:  Positive for wound.   Neurological: Negative for weakness and headaches.   Psychiatric/Behavioral: Negative for confusion and dysphoric mood.         Objective:      Physical Exam    virtual  Assessment:       1. Hidradenitis suppurativa    2. Acne, unspecified acne type        Plan:       Diagnoses and all orders for this visit:    Hidradenitis suppurativa  -     clindamycin (CLEOCIN) 300 MG capsule; Take 1 capsule (300 mg total) by mouth every 6 (six) hours. for 7 days    Acne, unspecified acne type  -     benzoyl peroxide 5 % lotion; Apply 2 g topically nightly.

## 2020-10-13 ENCOUNTER — PATIENT MESSAGE (OUTPATIENT)
Dept: FAMILY MEDICINE | Facility: CLINIC | Age: 40
End: 2020-10-13

## 2020-10-13 RX ORDER — TRAMADOL HYDROCHLORIDE 50 MG/1
50 TABLET ORAL EVERY 8 HOURS PRN
Qty: 60 TABLET | Refills: 0 | Status: SHIPPED | OUTPATIENT
Start: 2020-10-13 | End: 2020-11-12 | Stop reason: SDUPTHER

## 2020-10-18 ENCOUNTER — PATIENT MESSAGE (OUTPATIENT)
Dept: FAMILY MEDICINE | Facility: CLINIC | Age: 40
End: 2020-10-18

## 2020-10-19 DIAGNOSIS — F41.1 GENERALIZED ANXIETY DISORDER: ICD-10-CM

## 2020-10-19 RX ORDER — CLONAZEPAM 1 MG/1
1 TABLET ORAL 2 TIMES DAILY PRN
Qty: 60 TABLET | Refills: 1 | Status: SHIPPED | OUTPATIENT
Start: 2020-10-19 | End: 2020-11-25 | Stop reason: SDUPTHER

## 2020-11-05 DIAGNOSIS — M06.4 UNDIFFERENTIATED INFLAMMATORY ARTHRITIS: ICD-10-CM

## 2020-11-05 RX ORDER — PREDNISONE 10 MG/1
10 TABLET ORAL DAILY
Qty: 30 TABLET | Refills: 0 | Status: SHIPPED | OUTPATIENT
Start: 2020-11-05 | End: 2021-06-29

## 2020-11-11 ENCOUNTER — OFFICE VISIT (OUTPATIENT)
Dept: FAMILY MEDICINE | Facility: CLINIC | Age: 40
End: 2020-11-11
Payer: MEDICAID

## 2020-11-11 DIAGNOSIS — J06.9 UPPER RESPIRATORY TRACT INFECTION, UNSPECIFIED TYPE: Primary | ICD-10-CM

## 2020-11-11 DIAGNOSIS — R05.9 COUGH: ICD-10-CM

## 2020-11-11 DIAGNOSIS — R06.02 SHORTNESS OF BREATH: ICD-10-CM

## 2020-11-11 PROCEDURE — 99212 PR OFFICE/OUTPT VISIT, EST, LEVL II, 10-19 MIN: ICD-10-PCS | Mod: 95,,, | Performed by: NURSE PRACTITIONER

## 2020-11-11 PROCEDURE — 99212 OFFICE O/P EST SF 10 MIN: CPT | Mod: 95,,, | Performed by: NURSE PRACTITIONER

## 2020-11-11 RX ORDER — PROMETHAZINE HYDROCHLORIDE AND DEXTROMETHORPHAN HYDROBROMIDE 6.25; 15 MG/5ML; MG/5ML
5 SYRUP ORAL EVERY 4 HOURS PRN
Qty: 180 ML | Refills: 0 | Status: SHIPPED | OUTPATIENT
Start: 2020-11-11 | End: 2020-11-21

## 2020-11-11 RX ORDER — AZITHROMYCIN 250 MG/1
TABLET, FILM COATED ORAL
Qty: 6 TABLET | Refills: 0 | Status: SHIPPED | OUTPATIENT
Start: 2020-11-11 | End: 2020-11-16

## 2020-11-11 NOTE — PROGRESS NOTES
Clinic Note  11/11/2020      Subjective:       Patient ID:  Erica is a 40 y.o. female being seen in office today as an established patient.     Chief Complaint: Nasal Congestion, Cough, and Shortness of Breath    The patient location is: [place], [place]  The chief complaint leading to consultation is: Congestion, cough, SOB    Visit type: audiovisual    Face to Face time with patient: 5 minutes  10 minutes of total time spent on the encounter, which includes face to face time and non-face to face time preparing to see the patient (eg, review of tests), Obtaining and/or reviewing separately obtained history, Documenting clinical information in the electronic or other health record, Independently interpreting results (not separately reported) and communicating results to the patient/family/caregiver, or Care coordination (not separately reported).         Each patient to whom he or she provides medical services by telemedicine is:  (1) informed of the relationship between the physician and patient and the respective role of any other health care provider with respect to management of the patient; and (2) notified that he or she may decline to receive medical services by telemedicine and may withdraw from such care at any time.    Notes:     RANDY Maldonado is a pt of Dr. Palacios but new to me. She presents via virtual visit with complaints of congestion, dry cough, and SOB x 3 days. She reports sinus pressure/pain to face and sinus headaches. The cough is non-productive but worse at night. She has been around her mother who is sick with similar symptoms. Denies fevers, loss of taste/smell, diarrhea. She has taken Mucinex and Dayquil cold and sinus without relief of symptoms.     Shortness of Breath  This is a new problem. The current episode started in the past 7 days. The problem occurs intermittently. The problem has been unchanged. The average episode lasts 2 minutes. Associated symptoms include coryza, headaches,  orthopnea, PND and sputum production. Pertinent negatives include no abdominal pain, chest pain, claudication, ear pain, fever, hemoptysis, leg pain, leg swelling, neck pain, rash, rhinorrhea, sore throat, vomiting or wheezing. The patient has no known risk factors for DVT/PE. The treatment provided mild relief. There is no history of allergies, aspirin allergies, asthma, bronchiolitis, CAD, chronic lung disease, COPD, DVT, a heart failure, PE, pneumonia or a recent surgery.     Family History   Problem Relation Age of Onset    Diabetes Mellitus Mother     Cancer Maternal Grandmother     Cancer Maternal Grandfather      Past Surgical History:   Procedure Laterality Date     SECTION       Social History     Substance and Sexual Activity   Alcohol Use Not Currently     Patient has no known allergies.  Medication List with Changes/Refills   New Medications    AZITHROMYCIN (Z-ALPA) 250 MG TABLET    Take 2 tablets by mouth on day 1; Take 1 tablet by mouth on days 2-5    PROMETHAZINE-DEXTROMETHORPHAN (PROMETHAZINE-DM) 6.25-15 MG/5 ML SYRP    Take 5 mLs by mouth every 4 (four) hours as needed (cough).   Current Medications    ALBUTEROL (PROVENTIL/VENTOLIN HFA) 90 MCG/ACTUATION INHALER    Inhale 2 puffs into the lungs every 6 (six) hours as needed for Wheezing. Rescue    ATORVASTATIN (LIPITOR) 40 MG TABLET    TAKE 1 TABLET BY MOUTH EVERY DAY    CLONAZEPAM (KLONOPIN) 1 MG TABLET    Take 1 tablet (1 mg total) by mouth 2 (two) times daily as needed for Anxiety.    DICLOFENAC SODIUM (VOLTAREN) 1 % GEL    Apply 2 grams topically 4 (four) times daily.    ENGERIX-B, PF, 20 MCG/ML SYRG    TO BE ADMINISTERED BY PHARMACIST FOR IMMUNIZATION    ESCITALOPRAM OXALATE (LEXAPRO) 20 MG TABLET    TAKE 1 TABLET BY MOUTH EVERY DAY    FERROUS SULFATE 324 MG (65 MG IRON) TBEC    Take 324 mg by mouth 2 (two) times daily.    GABAPENTIN (NEURONTIN) 300 MG CAPSULE    TAKE 1 CAPSULE BY MOUTH THREE TIMES A DAY    DERIAN CLIFFORD, BATSHEVA CROHNS-[facility]  80 MG/0.8 ML PNKT    INJECT 160 MG (2 PENS) SUBCUTANEOUSLY ON DAY 1, THEN 80 MG SUBCUTANEOUSLY ON DAY 15, THEN SWITCH TO MAINTENANCE DOSE    HYDROCHLOROTHIAZIDE (HYDRODIURIL) 25 MG TABLET    TAKE 1 TABLET BY MOUTH EVERY DAY    IBUPROFEN (ADVIL,MOTRIN) 800 MG TABLET    TAKE 1 TABLET BY MOUTH 3 TIMES A DAY AS NEEDED FOR PAIN    LEVOTHYROXINE (SYNTHROID) 50 MCG TABLET    TAKE 1 TABLET BY MOUTH BEFORE BREAKFAST    MEDROXYPROGESTERONE (DEPO-PROVERA) 150 MG/ML INJECTION        METFORMIN (GLUCOPHAGE) 500 MG TABLET    TAKE 1 TABLET BY MOUTH TWICE A DAY WITH MEALS    MUPIROCIN (BACTROBAN) 2 % OINTMENT    Apply topically 3 (three) times daily.    NICOTINE (NICODERM CQ) 7 MG/24 HR    Place 1 patch onto the skin once daily.    NICOTINE POLACRILEX (NICORETTE) 4 MG GUM    CHEW 1 GUM BY MOUTH EVERY 4 TO 6 HOURS AS NEEDED    NICOTINE POLACRILEX 4 MG LOZG    Take 1 lozenge (4 mg total) by mouth every 4 to 6 hours as needed.    PREDNISONE (DELTASONE) 10 MG TABLET    Take 1 tablet (10 mg total) by mouth once daily.    SPIRONOLACTONE (ALDACTONE) 100 MG TABLET        TRAMADOL (ULTRAM) 50 MG TABLET    Take 1 tablet (50 mg total) by mouth every 8 (eight) hours as needed for Pain.       Review of Systems   Constitutional: Negative for activity change, fatigue, fever and unexpected weight change.   HENT: Positive for congestion, sinus pressure and sinus pain. Negative for ear pain, postnasal drip, rhinorrhea and sore throat.    Eyes: Negative for photophobia, redness and visual disturbance.   Respiratory: Positive for cough, sputum production and shortness of breath. Negative for hemoptysis and wheezing.    Cardiovascular: Positive for orthopnea and PND. Negative for chest pain, palpitations, claudication and leg swelling.   Gastrointestinal: Negative for abdominal pain, constipation, diarrhea, nausea and vomiting.   Genitourinary: Negative for dysuria, flank pain and frequency.   Musculoskeletal: Negative for gait problem and neck pain.    Skin: Negative for color change and rash.   Neurological: Positive for headaches. Negative for dizziness, tremors, seizures, syncope and weakness.   Hematological: Negative for adenopathy.   Psychiatric/Behavioral: Negative for confusion, decreased concentration, sleep disturbance and suicidal ideas. The patient is not nervous/anxious and is not hyperactive.               There were no vitals taken for this visit.  Estimated body mass index is 49.78 kg/m² as calculated from the following:    Height as of 7/1/20: 5' (1.524 m).    Weight as of 8/20/20: 115.6 kg (254 lb 14.4 oz).    Objective:        Physical Exam  Neurological:      General: No focal deficit present.      Mental Status: She is alert and oriented to person, place, and time.   Psychiatric:         Attention and Perception: Attention and perception normal.         Mood and Affect: Mood and affect normal.         Speech: Speech normal.         Behavior: Behavior normal.         Thought Content: Thought content normal.     Limited PE due to virtual visit      Assessment and Plan:         Erica was seen today for nasal congestion, cough and shortness of breath.    Diagnoses and all orders for this visit:    Upper respiratory tract infection, unspecified type  -     azithromycin (Z-ALPA) 250 MG tablet; Take 2 tablets by mouth on day 1; Take 1 tablet by mouth on days 2-5    Cough  -     COVID-19 Routine Screening; Future  -     promethazine-dextromethorphan (PROMETHAZINE-DM) 6.25-15 mg/5 mL Syrp; Take 5 mLs by mouth every 4 (four) hours as needed (cough).    Shortness of breath  -     COVID-19 Routine Screening; Future    Pt will have COVID test done since SOB.  Sleep with head elevated to promote drainage. Gargle with warm salt water for throat irritation. Increase fluid intake to help break up secretions.  Rest     Follow up:   Follow up if symptoms worsen or fail to improve.            Ani Gomez NP

## 2020-11-12 ENCOUNTER — PATIENT MESSAGE (OUTPATIENT)
Dept: FAMILY MEDICINE | Facility: CLINIC | Age: 40
End: 2020-11-12

## 2020-11-12 RX ORDER — TRAMADOL HYDROCHLORIDE 50 MG/1
50 TABLET ORAL EVERY 8 HOURS PRN
Qty: 21 TABLET | Refills: 0 | Status: SHIPPED | OUTPATIENT
Start: 2020-11-12 | End: 2020-12-10 | Stop reason: SDUPTHER

## 2020-11-13 ENCOUNTER — PATIENT MESSAGE (OUTPATIENT)
Dept: FAMILY MEDICINE | Facility: CLINIC | Age: 40
End: 2020-11-13

## 2020-11-13 LAB
SARS COV SOURCE: ABNORMAL
SARS-COV-2 RNA RESP QL NAA+PROBE: POSITIVE

## 2020-11-13 RX ORDER — ALBUTEROL SULFATE 108 UG/1
1-2 AEROSOL, METERED RESPIRATORY (INHALATION) EVERY 6 HOURS PRN
Qty: 18 G | Refills: 0 | Status: SHIPPED | OUTPATIENT
Start: 2020-11-13 | End: 2021-06-29

## 2020-11-17 ENCOUNTER — TELEPHONE (OUTPATIENT)
Dept: FAMILY MEDICINE | Facility: CLINIC | Age: 40
End: 2020-11-17

## 2020-11-17 NOTE — TELEPHONE ENCOUNTER
[facility]  ----- Message from Jill Palacios MD sent at 11/17/2020  2:21 PM CST -----  Can you check on this patient through [facility] records - she is currently hospitalized for covid

## 2020-11-19 DIAGNOSIS — U07.1 COVID-19 VIRUS DETECTED: ICD-10-CM

## 2020-11-23 ENCOUNTER — PATIENT MESSAGE (OUTPATIENT)
Dept: FAMILY MEDICINE | Facility: CLINIC | Age: 40
End: 2020-11-23

## 2020-11-25 ENCOUNTER — OFFICE VISIT (OUTPATIENT)
Dept: FAMILY MEDICINE | Facility: CLINIC | Age: 40
End: 2020-11-25
Payer: MEDICAID

## 2020-11-25 DIAGNOSIS — M79.604 PAIN IN BOTH LOWER EXTREMITIES: ICD-10-CM

## 2020-11-25 DIAGNOSIS — F41.1 GENERALIZED ANXIETY DISORDER: ICD-10-CM

## 2020-11-25 DIAGNOSIS — M79.605 PAIN IN BOTH LOWER EXTREMITIES: ICD-10-CM

## 2020-11-25 DIAGNOSIS — U07.1 COVID-19: Primary | ICD-10-CM

## 2020-11-25 PROCEDURE — 99213 PR OFFICE/OUTPT VISIT, EST, LEVL III, 20-29 MIN: ICD-10-PCS | Mod: 95,,, | Performed by: FAMILY MEDICINE

## 2020-11-25 PROCEDURE — 99213 OFFICE O/P EST LOW 20 MIN: CPT | Mod: 95,,, | Performed by: FAMILY MEDICINE

## 2020-11-25 RX ORDER — FLURBIPROFEN SODIUM 0.3 MG/ML
1 SOLUTION/ DROPS OPHTHALMIC
Status: CANCELLED | OUTPATIENT
Start: 2020-11-25 | End: 2020-11-25

## 2020-11-25 RX ORDER — CLONAZEPAM 1 MG/1
1 TABLET ORAL 2 TIMES DAILY PRN
Qty: 60 TABLET | Refills: 1 | Status: SHIPPED | OUTPATIENT
Start: 2020-11-25 | End: 2020-12-27 | Stop reason: SDUPTHER

## 2020-11-25 RX ORDER — POLYMYXIN B SULFATE AND TRIMETHOPRIM 1; 10000 MG/ML; [USP'U]/ML
1 SOLUTION OPHTHALMIC EVERY 6 HOURS
Status: CANCELLED | OUTPATIENT
Start: 2020-11-25

## 2020-11-25 NOTE — PROGRESS NOTES
Subjective:       Patient ID: Erica Law is a 40 y.o. female.    Chief Complaint: follow up covid hospitalization    HPI   The patient location is: [place]  The chief complaint leading to consultation is:  f/u covid    Visit type: audiovisual    Face to Face time with patient: 13 minutes of total time spent on the encounter, which includes face to face time and non-face to face time preparing to see the patient (eg, review of tests), Obtaining and/or reviewing separately obtained history, Documenting clinical information in the electronic or other health record, Independently interpreting results (not separately reported) and communicating results to the patient/family/caregiver, or Care coordination (not separately reported).      Each patient to whom he or she provides medical services by telemedicine is:  (1) informed of the relationship between the physician and patient and the respective role of any other health care provider with respect to management of the patient; and (2) notified that he or she may decline to receive medical services by telemedicine and may withdraw from such care at any time.    F[facility]u covid [facility] - she stayed 3 days total and was discharged in stable condition. Sob is improving and cough has resolved. Exercise tolerance improving. She reports some b/l leg pain but no swelling, redness, skin changes. Pain mostly bothers her at night.     Anxiety - controlled on clonazepam prn    She is currently off humira - taken off when dx'd with covid. Goes back to derm in dec     Review of Systems   Constitutional: Negative for activity change, chills, diaphoresis, fatigue, fever and unexpected weight change.   HENT: Negative for hearing loss, rhinorrhea and trouble swallowing.    Eyes: Negative for discharge and visual disturbance.   Respiratory: Negative for chest tightness, shortness of breath and wheezing.    Cardiovascular: Negative for chest pain,  palpitations and leg swelling.   Gastrointestinal: Negative for blood in stool, constipation, diarrhea and vomiting.   Endocrine: Negative for polydipsia and polyuria.   Genitourinary: Negative for decreased urine volume, difficulty urinating, dysuria, hematuria and menstrual problem.   Musculoskeletal: Positive for leg pain. Negative for arthralgias, joint swelling and neck pain.   Neurological: Negative for weakness and headaches.   Psychiatric/Behavioral: Negative for confusion, dysphoric mood and sleep disturbance. The patient is not nervous/anxious.          Objective:      Physical Exam    virtual  Assessment:       1. COVID-19    2. Generalized anxiety disorder    3. Pain in both lower extremities        Plan:       Diagnoses and all orders for this visit:    COVID-19  Comments:  recovered after hospital admission, she is advised that it has been 14 days since + test as of tomorrow so she is cleared    Generalized anxiety disorder  -     clonazePAM (KLONOPIN) 1 MG tablet; Take 1 tablet (1 mg total) by mouth 2 (two) times daily as needed for Anxiety.    Pain in both lower extremities  Comments:  monitor for swelling, redness - report to ED immediately if develops either. suspect from deconditioning

## 2020-12-09 ENCOUNTER — PATIENT MESSAGE (OUTPATIENT)
Dept: FAMILY MEDICINE | Facility: CLINIC | Age: 40
End: 2020-12-09

## 2020-12-10 ENCOUNTER — PATIENT MESSAGE (OUTPATIENT)
Dept: FAMILY MEDICINE | Facility: CLINIC | Age: 40
End: 2020-12-10

## 2020-12-10 RX ORDER — TRAMADOL HYDROCHLORIDE 50 MG/1
50 TABLET ORAL EVERY 8 HOURS PRN
Qty: 60 TABLET | Refills: 0 | Status: SHIPPED | OUTPATIENT
Start: 2020-12-10 | End: 2021-01-08

## 2020-12-11 ENCOUNTER — PATIENT MESSAGE (OUTPATIENT)
Dept: DERMATOLOGY | Facility: CLINIC | Age: 40
End: 2020-12-11

## 2020-12-27 DIAGNOSIS — F41.1 GENERALIZED ANXIETY DISORDER: ICD-10-CM

## 2020-12-28 RX ORDER — CLONAZEPAM 1 MG/1
1 TABLET ORAL 2 TIMES DAILY PRN
Qty: 60 TABLET | Refills: 1 | Status: SHIPPED | OUTPATIENT
Start: 2020-12-28 | End: 2021-02-23 | Stop reason: SDUPTHER

## 2021-01-08 RX ORDER — TRAMADOL HYDROCHLORIDE 50 MG/1
TABLET ORAL
Qty: 60 TABLET | Refills: 0 | Status: SHIPPED | OUTPATIENT
Start: 2021-01-08 | End: 2021-01-11 | Stop reason: SDUPTHER

## 2021-01-11 ENCOUNTER — OFFICE VISIT (OUTPATIENT)
Dept: FAMILY MEDICINE | Facility: CLINIC | Age: 41
End: 2021-01-11
Payer: MEDICAID

## 2021-01-11 DIAGNOSIS — L03.90 CELLULITIS, UNSPECIFIED CELLULITIS SITE: Primary | ICD-10-CM

## 2021-01-11 DIAGNOSIS — B88.8 INFESTATION BY BED BUG: ICD-10-CM

## 2021-01-11 DIAGNOSIS — M19.90 ARTHRITIS: ICD-10-CM

## 2021-01-11 PROCEDURE — 99212 OFFICE O/P EST SF 10 MIN: CPT | Mod: 95,,, | Performed by: FAMILY MEDICINE

## 2021-01-11 PROCEDURE — 99212 PR OFFICE/OUTPT VISIT, EST, LEVL II, 10-19 MIN: ICD-10-PCS | Mod: 95,,, | Performed by: FAMILY MEDICINE

## 2021-01-11 RX ORDER — PERMETHRIN 50 MG/G
CREAM TOPICAL ONCE
Qty: 60 G | Refills: 0 | Status: SHIPPED | OUTPATIENT
Start: 2021-01-11 | End: 2021-01-11

## 2021-01-11 RX ORDER — TRIAMCINOLONE ACETONIDE 1 MG/G
OINTMENT TOPICAL 2 TIMES DAILY
Qty: 30 G | Refills: 0 | Status: SHIPPED | OUTPATIENT
Start: 2021-01-11 | End: 2021-05-11

## 2021-01-11 RX ORDER — TRAMADOL HYDROCHLORIDE 50 MG/1
50 TABLET ORAL EVERY 6 HOURS PRN
Qty: 60 TABLET | Refills: 0 | Status: SHIPPED | OUTPATIENT
Start: 2021-01-11 | End: 2021-02-09 | Stop reason: SDUPTHER

## 2021-01-11 RX ORDER — CLINDAMYCIN HYDROCHLORIDE 300 MG/1
300 CAPSULE ORAL EVERY 6 HOURS
Qty: 28 CAPSULE | Refills: 0 | Status: SHIPPED | OUTPATIENT
Start: 2021-01-11 | End: 2021-01-18

## 2021-01-11 RX ORDER — HYDROXYZINE HYDROCHLORIDE 25 MG/1
25 TABLET, FILM COATED ORAL 3 TIMES DAILY PRN
Qty: 30 TABLET | Refills: 0 | Status: SHIPPED | OUTPATIENT
Start: 2021-01-11 | End: 2021-01-21

## 2021-01-20 ENCOUNTER — PATIENT MESSAGE (OUTPATIENT)
Dept: FAMILY MEDICINE | Facility: CLINIC | Age: 41
End: 2021-01-20

## 2021-01-21 ENCOUNTER — PATIENT MESSAGE (OUTPATIENT)
Dept: FAMILY MEDICINE | Facility: CLINIC | Age: 41
End: 2021-01-21

## 2021-01-22 DIAGNOSIS — D64.9 ANEMIA, UNSPECIFIED TYPE: Primary | ICD-10-CM

## 2021-02-01 ENCOUNTER — OFFICE VISIT (OUTPATIENT)
Dept: HEMATOLOGY/ONCOLOGY | Facility: CLINIC | Age: 41
End: 2021-02-01
Payer: MEDICAID

## 2021-02-01 VITALS
HEART RATE: 104 BPM | HEIGHT: 60 IN | DIASTOLIC BLOOD PRESSURE: 82 MMHG | TEMPERATURE: 98 F | BODY MASS INDEX: 49.08 KG/M2 | OXYGEN SATURATION: 95 % | SYSTOLIC BLOOD PRESSURE: 125 MMHG | WEIGHT: 250 LBS | RESPIRATION RATE: 16 BRPM

## 2021-02-01 DIAGNOSIS — D64.9 ANEMIA, UNSPECIFIED TYPE: ICD-10-CM

## 2021-02-01 LAB
FERRITIN SERPL-MCNC: 235 NG/ML (ref 8–388)
HYPOCHROMIA BLD QL SMEAR: NORMAL
IRON: 77 UG/DL (ref 26–170)
RETIC %: 1.7 % (ref 0.1–1.5)
TOTAL IRON BINDING CAPACITY: 258 UG/DL (ref 250–450)

## 2021-02-01 PROCEDURE — 99213 PR OFFICE/OUTPT VISIT, EST, LEVL III, 20-29 MIN: ICD-10-PCS | Mod: S$GLB,,, | Performed by: INTERNAL MEDICINE

## 2021-02-01 PROCEDURE — 99213 OFFICE O/P EST LOW 20 MIN: CPT | Mod: S$GLB,,, | Performed by: INTERNAL MEDICINE

## 2021-02-04 PROBLEM — D64.9 ANEMIA: Status: ACTIVE | Noted: 2021-02-04

## 2021-02-09 DIAGNOSIS — M19.90 ARTHRITIS: ICD-10-CM

## 2021-02-10 DIAGNOSIS — M19.90 ARTHRITIS: ICD-10-CM

## 2021-02-11 RX ORDER — TRAMADOL HYDROCHLORIDE 50 MG/1
50 TABLET ORAL EVERY 6 HOURS PRN
Qty: 60 TABLET | Refills: 0 | OUTPATIENT
Start: 2021-02-11

## 2021-02-11 RX ORDER — TRAMADOL HYDROCHLORIDE 50 MG/1
50 TABLET ORAL EVERY 6 HOURS PRN
Qty: 60 TABLET | Refills: 0 | Status: SHIPPED | OUTPATIENT
Start: 2021-02-11 | End: 2021-04-08 | Stop reason: SDUPTHER

## 2021-02-15 ENCOUNTER — TELEPHONE (OUTPATIENT)
Dept: OBSTETRICS AND GYNECOLOGY | Facility: CLINIC | Age: 41
End: 2021-02-15

## 2021-02-23 DIAGNOSIS — F41.1 GENERALIZED ANXIETY DISORDER: ICD-10-CM

## 2021-02-24 RX ORDER — CLONAZEPAM 1 MG/1
1 TABLET ORAL 2 TIMES DAILY PRN
Qty: 60 TABLET | Refills: 1 | Status: SHIPPED | OUTPATIENT
Start: 2021-02-24 | End: 2021-03-29 | Stop reason: SDUPTHER

## 2021-03-29 DIAGNOSIS — F41.1 GENERALIZED ANXIETY DISORDER: ICD-10-CM

## 2021-03-29 RX ORDER — CLONAZEPAM 1 MG/1
1 TABLET ORAL 2 TIMES DAILY PRN
Qty: 60 TABLET | Refills: 1 | Status: SHIPPED | OUTPATIENT
Start: 2021-03-29 | End: 2021-03-30 | Stop reason: SDUPTHER

## 2021-03-30 DIAGNOSIS — M19.90 ARTHRITIS: ICD-10-CM

## 2021-03-30 DIAGNOSIS — F41.1 GENERALIZED ANXIETY DISORDER: ICD-10-CM

## 2021-03-30 RX ORDER — TRAMADOL HYDROCHLORIDE 50 MG/1
50 TABLET ORAL EVERY 6 HOURS PRN
Qty: 60 TABLET | Refills: 0 | Status: CANCELLED | OUTPATIENT
Start: 2021-03-30

## 2021-03-30 RX ORDER — TRAMADOL HYDROCHLORIDE 50 MG/1
TABLET ORAL
Qty: 60 TABLET | Refills: 0 | OUTPATIENT
Start: 2021-03-30

## 2021-03-30 RX ORDER — CLONAZEPAM 1 MG/1
1 TABLET ORAL 2 TIMES DAILY PRN
Qty: 60 TABLET | Refills: 1 | Status: SHIPPED | OUTPATIENT
Start: 2021-03-30 | End: 2021-05-25 | Stop reason: SDUPTHER

## 2021-04-06 ENCOUNTER — PATIENT MESSAGE (OUTPATIENT)
Dept: FAMILY MEDICINE | Facility: CLINIC | Age: 41
End: 2021-04-06

## 2021-04-06 DIAGNOSIS — M19.90 ARTHRITIS: ICD-10-CM

## 2021-04-08 DIAGNOSIS — M19.90 ARTHRITIS: ICD-10-CM

## 2021-04-08 RX ORDER — TRAMADOL HYDROCHLORIDE 50 MG/1
50 TABLET ORAL EVERY 6 HOURS PRN
Qty: 60 TABLET | Refills: 0 | Status: SHIPPED | OUTPATIENT
Start: 2021-04-08 | End: 2021-05-25 | Stop reason: SDUPTHER

## 2021-04-08 RX ORDER — TRAMADOL HYDROCHLORIDE 50 MG/1
50 TABLET ORAL EVERY 6 HOURS PRN
Qty: 60 TABLET | Refills: 0 | OUTPATIENT
Start: 2021-04-08

## 2021-05-11 ENCOUNTER — PATIENT MESSAGE (OUTPATIENT)
Dept: FAMILY MEDICINE | Facility: CLINIC | Age: 41
End: 2021-05-11

## 2021-05-11 RX ORDER — FLUCONAZOLE 150 MG/1
150 TABLET ORAL DAILY
Qty: 1 TABLET | Refills: 0 | Status: SHIPPED | OUTPATIENT
Start: 2021-05-11 | End: 2021-05-12

## 2021-05-25 ENCOUNTER — PATIENT MESSAGE (OUTPATIENT)
Dept: FAMILY MEDICINE | Facility: CLINIC | Age: 41
End: 2021-05-25

## 2021-05-25 DIAGNOSIS — F41.1 GENERALIZED ANXIETY DISORDER: ICD-10-CM

## 2021-05-25 DIAGNOSIS — M19.90 ARTHRITIS: ICD-10-CM

## 2021-05-25 RX ORDER — CLONAZEPAM 1 MG/1
1 TABLET ORAL 2 TIMES DAILY PRN
Qty: 60 TABLET | Refills: 1 | Status: SHIPPED | OUTPATIENT
Start: 2021-05-25 | End: 2021-07-26 | Stop reason: SDUPTHER

## 2021-05-25 RX ORDER — TRAMADOL HYDROCHLORIDE 50 MG/1
50 TABLET ORAL EVERY 6 HOURS PRN
Qty: 60 TABLET | Refills: 0 | Status: SHIPPED | OUTPATIENT
Start: 2021-05-25 | End: 2021-07-26 | Stop reason: SDUPTHER

## 2021-06-08 ENCOUNTER — PATIENT MESSAGE (OUTPATIENT)
Dept: FAMILY MEDICINE | Facility: CLINIC | Age: 41
End: 2021-06-08

## 2021-06-08 DIAGNOSIS — M06.4 UNDIFFERENTIATED INFLAMMATORY ARTHRITIS: Primary | ICD-10-CM

## 2021-06-08 RX ORDER — DICLOFENAC SODIUM 10 MG/G
2 GEL TOPICAL 4 TIMES DAILY
Qty: 1 TUBE | Refills: 5 | Status: SHIPPED | OUTPATIENT
Start: 2021-06-08 | End: 2021-09-23

## 2021-06-29 ENCOUNTER — OFFICE VISIT (OUTPATIENT)
Dept: FAMILY MEDICINE | Facility: CLINIC | Age: 41
End: 2021-06-29
Payer: MEDICARE

## 2021-06-29 VITALS
OXYGEN SATURATION: 98 % | BODY MASS INDEX: 52.42 KG/M2 | WEIGHT: 267 LBS | HEIGHT: 60 IN | HEART RATE: 94 BPM | DIASTOLIC BLOOD PRESSURE: 74 MMHG | TEMPERATURE: 99 F | SYSTOLIC BLOOD PRESSURE: 118 MMHG | RESPIRATION RATE: 18 BRPM

## 2021-06-29 DIAGNOSIS — N64.89 HEMATOMA OF LEFT BREAST: Primary | ICD-10-CM

## 2021-06-29 DIAGNOSIS — L02.91 ABSCESS: ICD-10-CM

## 2021-06-29 PROCEDURE — 99213 OFFICE O/P EST LOW 20 MIN: CPT | Mod: S$GLB,,, | Performed by: STUDENT IN AN ORGANIZED HEALTH CARE EDUCATION/TRAINING PROGRAM

## 2021-06-29 PROCEDURE — 99213 PR OFFICE/OUTPT VISIT, EST, LEVL III, 20-29 MIN: ICD-10-PCS | Mod: S$GLB,,, | Performed by: STUDENT IN AN ORGANIZED HEALTH CARE EDUCATION/TRAINING PROGRAM

## 2021-06-29 RX ORDER — PERMETHRIN 50 MG/G
CREAM TOPICAL
COMMUNITY
Start: 2021-01-11 | End: 2022-01-05

## 2021-06-29 RX ORDER — ADALIMUMAB 40MG/0.4ML
KIT SUBCUTANEOUS
COMMUNITY
Start: 2021-06-18 | End: 2023-05-19

## 2021-06-30 ENCOUNTER — OFFICE VISIT (OUTPATIENT)
Dept: SURGERY | Facility: CLINIC | Age: 41
End: 2021-06-30
Payer: MEDICARE

## 2021-06-30 DIAGNOSIS — N64.89 HEMATOMA OF LEFT BREAST: ICD-10-CM

## 2021-06-30 DIAGNOSIS — N61.1 LEFT BREAST ABSCESS: Primary | ICD-10-CM

## 2021-06-30 LAB
ANION GAP SERPL CALC-SCNC: 8 MMOL/L (ref 3–11)
BUN SERPL-MCNC: 16 MG/DL (ref 7–18)
BUN/CREAT SERPL: 11.26 RATIO (ref 7–18)
CALCIUM SERPL-MCNC: 8.2 MG/DL (ref 8.8–10.5)
CHLORIDE SERPL-SCNC: 107 MMOL/L (ref 100–108)
CO2 SERPL-SCNC: 24 MMOL/L (ref 21–32)
CREAT SERPL-MCNC: 1.42 MG/DL (ref 0.55–1.02)
ERYTHROCYTE [DISTWIDTH] IN BLOOD BY AUTOMATED COUNT: 13.5 % (ref 12.5–18)
GFR ESTIMATION: 50
GLUCOSE SERPL-MCNC: 131 MG/DL (ref 70–110)
HCG QUALITATIVE: NEGATIVE
HCT VFR BLD AUTO: 29.2 % (ref 37–47)
HGB BLD-MCNC: 8.6 G/DL (ref 12–16)
MCH RBC QN AUTO: 29 PG (ref 27–31.2)
MCHC RBC AUTO-ENTMCNC: 29.5 G/DL (ref 31.8–35.4)
MCV RBC AUTO: 98.3 FL (ref 80–97)
NUCLEATED RED BLOOD CELLS: 0.2 %
PLATELETS: 296 10*3/UL (ref 142–424)
POTASSIUM SERPL-SCNC: 5.4 MMOL/L (ref 3.6–5.2)
RBC # BLD AUTO: 2.97 10*6/UL (ref 4.2–5.4)
SODIUM BLD-SCNC: 139 MMOL/L (ref 135–145)
WBC # BLD: 8.5 10*3/UL (ref 4.6–10.2)

## 2021-06-30 PROCEDURE — 99213 OFFICE O/P EST LOW 20 MIN: CPT | Mod: S$GLB,,, | Performed by: SURGERY

## 2021-06-30 PROCEDURE — 99213 PR OFFICE/OUTPT VISIT, EST, LEVL III, 20-29 MIN: ICD-10-PCS | Mod: S$GLB,,, | Performed by: SURGERY

## 2021-07-01 ENCOUNTER — OUTSIDE PLACE OF SERVICE (OUTPATIENT)
Dept: ADMINISTRATIVE | Facility: OTHER | Age: 41
End: 2021-07-01

## 2021-07-01 LAB
GLUCOSE SERPL-MCNC: 85 MG/DL (ref 70–105)
GLUCOSE SERPL-MCNC: 89 MG/DL (ref 70–105)

## 2021-07-01 PROCEDURE — 19020 MASTOTOMY EXPL DRG ABSC DP: CPT | Mod: LT,,, | Performed by: SURGERY

## 2021-07-01 PROCEDURE — 19020 PR EXPLO/DRAIN BREAST ABSCESS: ICD-10-PCS | Mod: LT,,, | Performed by: SURGERY

## 2021-07-06 LAB — SPECIMEN TO PATHOLOGY: NORMAL

## 2021-07-07 ENCOUNTER — OFFICE VISIT (OUTPATIENT)
Dept: SURGERY | Facility: CLINIC | Age: 41
End: 2021-07-07
Payer: MEDICAID

## 2021-07-07 DIAGNOSIS — Z98.890 POST-OPERATIVE STATE: Primary | ICD-10-CM

## 2021-07-07 PROCEDURE — 99024 PR POST-OP FOLLOW-UP VISIT: ICD-10-PCS | Mod: S$GLB,,, | Performed by: SURGERY

## 2021-07-07 PROCEDURE — 99024 POSTOP FOLLOW-UP VISIT: CPT | Mod: S$GLB,,, | Performed by: SURGERY

## 2021-07-12 ENCOUNTER — OFFICE VISIT (OUTPATIENT)
Dept: SURGERY | Facility: CLINIC | Age: 41
End: 2021-07-12
Payer: MEDICAID

## 2021-07-12 DIAGNOSIS — Z98.890 POST-OPERATIVE STATE: Primary | ICD-10-CM

## 2021-07-12 PROCEDURE — 99024 PR POST-OP FOLLOW-UP VISIT: ICD-10-PCS | Mod: S$GLB,,, | Performed by: SURGERY

## 2021-07-12 PROCEDURE — 99024 POSTOP FOLLOW-UP VISIT: CPT | Mod: S$GLB,,, | Performed by: SURGERY

## 2021-07-13 ENCOUNTER — PATIENT MESSAGE (OUTPATIENT)
Dept: SURGERY | Facility: CLINIC | Age: 41
End: 2021-07-13

## 2021-07-13 ENCOUNTER — TELEPHONE (OUTPATIENT)
Dept: SURGERY | Facility: CLINIC | Age: 41
End: 2021-07-13

## 2021-07-14 ENCOUNTER — PATIENT MESSAGE (OUTPATIENT)
Dept: SURGERY | Facility: CLINIC | Age: 41
End: 2021-07-14

## 2021-07-21 ENCOUNTER — OFFICE VISIT (OUTPATIENT)
Dept: SURGERY | Facility: CLINIC | Age: 41
End: 2021-07-21
Payer: MEDICAID

## 2021-07-21 DIAGNOSIS — Z98.890 POST-OPERATIVE STATE: Primary | ICD-10-CM

## 2021-07-21 PROCEDURE — 99024 POSTOP FOLLOW-UP VISIT: CPT | Mod: S$GLB,,, | Performed by: SURGERY

## 2021-07-21 PROCEDURE — 99024 PR POST-OP FOLLOW-UP VISIT: ICD-10-PCS | Mod: S$GLB,,, | Performed by: SURGERY

## 2021-07-21 RX ORDER — FLUCONAZOLE 150 MG/1
150 TABLET ORAL DAILY
Qty: 1 TABLET | Refills: 0 | Status: SHIPPED | OUTPATIENT
Start: 2021-07-21 | End: 2021-07-22

## 2021-07-26 DIAGNOSIS — F41.1 GENERALIZED ANXIETY DISORDER: ICD-10-CM

## 2021-07-26 DIAGNOSIS — M19.90 ARTHRITIS: ICD-10-CM

## 2021-07-27 RX ORDER — TRAMADOL HYDROCHLORIDE 50 MG/1
50 TABLET ORAL EVERY 6 HOURS PRN
Qty: 60 TABLET | Refills: 0 | Status: SHIPPED | OUTPATIENT
Start: 2021-07-27 | End: 2021-09-13 | Stop reason: SDUPTHER

## 2021-07-27 RX ORDER — CLONAZEPAM 1 MG/1
1 TABLET ORAL 2 TIMES DAILY PRN
Qty: 60 TABLET | Refills: 1 | Status: SHIPPED | OUTPATIENT
Start: 2021-07-27 | End: 2021-09-23 | Stop reason: SDUPTHER

## 2021-08-26 DIAGNOSIS — F41.1 GENERALIZED ANXIETY DISORDER: ICD-10-CM

## 2021-08-30 ENCOUNTER — PATIENT MESSAGE (OUTPATIENT)
Dept: FAMILY MEDICINE | Facility: CLINIC | Age: 41
End: 2021-08-30

## 2021-08-30 RX ORDER — CLONAZEPAM 1 MG/1
1 TABLET ORAL 2 TIMES DAILY PRN
Qty: 60 TABLET | Refills: 1 | OUTPATIENT
Start: 2021-08-30 | End: 2021-09-29

## 2021-09-13 ENCOUNTER — OFFICE VISIT (OUTPATIENT)
Dept: FAMILY MEDICINE | Facility: CLINIC | Age: 41
End: 2021-09-13
Payer: MEDICARE

## 2021-09-13 VITALS
DIASTOLIC BLOOD PRESSURE: 69 MMHG | BODY MASS INDEX: 51.24 KG/M2 | SYSTOLIC BLOOD PRESSURE: 135 MMHG | WEIGHT: 261 LBS | HEART RATE: 84 BPM | OXYGEN SATURATION: 99 % | HEIGHT: 60 IN

## 2021-09-13 DIAGNOSIS — M19.90 ARTHRITIS: ICD-10-CM

## 2021-09-13 DIAGNOSIS — R73.03 PREDIABETES: ICD-10-CM

## 2021-09-13 DIAGNOSIS — E03.9 HYPOTHYROIDISM, UNSPECIFIED TYPE: ICD-10-CM

## 2021-09-13 DIAGNOSIS — Z00.00 PREVENTATIVE HEALTH CARE: Primary | ICD-10-CM

## 2021-09-13 DIAGNOSIS — R79.9 ABNORMAL FINDING OF BLOOD CHEMISTRY, UNSPECIFIED: ICD-10-CM

## 2021-09-13 PROCEDURE — 3075F PR MOST RECENT SYSTOLIC BLOOD PRESS GE 130-139MM HG: ICD-10-PCS | Mod: CPTII,S$GLB,, | Performed by: FAMILY MEDICINE

## 2021-09-13 PROCEDURE — 3078F PR MOST RECENT DIASTOLIC BLOOD PRESSURE < 80 MM HG: ICD-10-PCS | Mod: CPTII,S$GLB,, | Performed by: FAMILY MEDICINE

## 2021-09-13 PROCEDURE — 1160F RVW MEDS BY RX/DR IN RCRD: CPT | Mod: CPTII,S$GLB,, | Performed by: FAMILY MEDICINE

## 2021-09-13 PROCEDURE — 3008F BODY MASS INDEX DOCD: CPT | Mod: CPTII,S$GLB,, | Performed by: FAMILY MEDICINE

## 2021-09-13 PROCEDURE — 99214 OFFICE O/P EST MOD 30 MIN: CPT | Mod: S$GLB,,, | Performed by: FAMILY MEDICINE

## 2021-09-13 PROCEDURE — 3008F PR BODY MASS INDEX (BMI) DOCUMENTED: ICD-10-PCS | Mod: CPTII,S$GLB,, | Performed by: FAMILY MEDICINE

## 2021-09-13 PROCEDURE — 1159F PR MEDICATION LIST DOCUMENTED IN MEDICAL RECORD: ICD-10-PCS | Mod: CPTII,S$GLB,, | Performed by: FAMILY MEDICINE

## 2021-09-13 PROCEDURE — 3078F DIAST BP <80 MM HG: CPT | Mod: CPTII,S$GLB,, | Performed by: FAMILY MEDICINE

## 2021-09-13 PROCEDURE — 1160F PR REVIEW ALL MEDS BY PRESCRIBER/CLIN PHARMACIST DOCUMENTED: ICD-10-PCS | Mod: CPTII,S$GLB,, | Performed by: FAMILY MEDICINE

## 2021-09-13 PROCEDURE — 99214 PR OFFICE/OUTPT VISIT, EST, LEVL IV, 30-39 MIN: ICD-10-PCS | Mod: S$GLB,,, | Performed by: FAMILY MEDICINE

## 2021-09-13 PROCEDURE — 1159F MED LIST DOCD IN RCRD: CPT | Mod: CPTII,S$GLB,, | Performed by: FAMILY MEDICINE

## 2021-09-13 PROCEDURE — 3075F SYST BP GE 130 - 139MM HG: CPT | Mod: CPTII,S$GLB,, | Performed by: FAMILY MEDICINE

## 2021-09-13 RX ORDER — TRAMADOL HYDROCHLORIDE 50 MG/1
50 TABLET ORAL EVERY 6 HOURS PRN
Qty: 60 TABLET | Refills: 0 | Status: SHIPPED | OUTPATIENT
Start: 2021-09-13 | End: 2021-10-08 | Stop reason: SDUPTHER

## 2021-09-17 LAB
ABS NRBC COUNT: 0 X 10 3/UL (ref 0–0.01)
ABSOLUTE BASOPHIL: 0.03 X 10 3/UL (ref 0–0.22)
ABSOLUTE EOSINOPHIL: 0.17 X 10 3/UL (ref 0.04–0.54)
ABSOLUTE IMMATURE GRAN: 0.05 X 10 3/UL (ref 0–0.04)
ABSOLUTE LYMPHOCYTE: 3.34 X 10 3/UL (ref 0.86–4.75)
ABSOLUTE MONOCYTE: 0.61 X 10 3/UL (ref 0.22–1.08)
ALBUMIN SERPL-MCNC: 4 G/DL (ref 3.5–5.2)
ALBUMIN/GLOB SERPL ELPH: 0.8 {RATIO} (ref 1–2.7)
ALP ISOS SERPL LEV INH-CCNC: 94 U/L (ref 35–105)
ALT (SGPT): 14 U/L (ref 0–33)
AMORPH URATE CRY URNS QL MICRO: NEGATIVE
ANION GAP SERPL CALC-SCNC: 11 MMOL/L (ref 8–17)
AST SERPL-CCNC: 16 U/L (ref 0–32)
BACTERIA #/AREA URNS HPF: ABNORMAL /[HPF]
BASOPHILS NFR BLD: 0.3 % (ref 0.2–1.2)
BILIRUB UR QL STRIP: NEGATIVE
BILIRUBIN, TOTAL: 0.32 MG/DL (ref 0–1.2)
BUN/CREAT SERPL: 10.6 (ref 6–20)
CALCIUM SERPL-MCNC: 9.6 MG/DL (ref 8.6–10.2)
CARBON DIOXIDE, CO2: 21 MMOL/L (ref 22–29)
CHLORIDE: 105 MMOL/L (ref 98–107)
CHOLEST SERPL-MSCNC: 205 MG/DL (ref 100–200)
CLARITY UR: ABNORMAL
COLOR UR: YELLOW
CREAT SERPL-MCNC: 1.24 MG/DL (ref 0.5–0.9)
EOSINOPHIL NFR BLD: 1.5 % (ref 0.7–7)
EPITHELIAL CELLS: ABNORMAL
ESTIMATED AVERAGE GLUCOSE: 123 MG/DL
GFR ESTIMATION: 47.91
GLOBULIN: 5.2 G/DL (ref 1.5–4.5)
GLUCOSE (UA): NEGATIVE MG/DL
GLUCOSE: 117 MG/DL (ref 74–106)
HBA1C MFR BLD: 5.9 % (ref 4–6)
HCT VFR BLD AUTO: 35.7 % (ref 37–47)
HDLC SERPL-MCNC: 55 MG/DL
HGB BLD-MCNC: 10.9 G/DL (ref 12–16)
IMMATURE GRANULOCYTES: 0.4 % (ref 0–0.5)
KETONES UR QL STRIP: NEGATIVE MG/DL
LDL/HDL RATIO: 2.2 (ref 1–3)
LDLC SERPL CALC-MCNC: 119.2 MG/DL (ref 0–100)
LEUKOCYTE ESTERASE UR QL STRIP: NEGATIVE
LYMPHOCYTES NFR BLD: 29.7 % (ref 19.3–53.1)
MCH RBC QN AUTO: 27.9 PG (ref 27–32)
MCHC RBC AUTO-ENTMCNC: 30.5 G/DL (ref 32–36)
MCV RBC AUTO: 91.3 FL (ref 82–100)
MONOCYTES NFR BLD: 5.4 % (ref 4.7–12.5)
MUCOUS THREADS URNS QL MICRO: NEGATIVE
NEUTROPHILS # BLD AUTO: 7.06 X 10 3/UL (ref 2.15–7.56)
NEUTROPHILS NFR BLD: 62.7 %
NITRITE UR QL STRIP: NEGATIVE
NUCLEATED RED BLOOD CELLS: 0 /100 WBC (ref 0–0.2)
OCCULT BLOOD: NEGATIVE
PH, URINE: 5 (ref 5–7.5)
PLATELET # BLD AUTO: 312 X 10 3/UL (ref 135–400)
POTASSIUM: 4.1 MMOL/L (ref 3.5–5.1)
PROT SNV-MCNC: 9.2 G/DL (ref 6.4–8.3)
PROT UR QL STRIP: 100 MG/DL
RBC # BLD AUTO: 3.91 X 10 6/UL (ref 4.2–5.4)
RBC/HPF: NEGATIVE
RDW-SD: 45.3 FL (ref 37–54)
SODIUM: 137 MMOL/L (ref 136–145)
SP GR UR STRIP: 1.01 (ref 1–1.03)
T3FREE SERPL DIALY-MCNC: 2.28 PG/ML (ref 2–4.4)
T4, FREE: 0.88 NG/DL (ref 0.93–1.7)
TRIGL SERPL-MCNC: 154 MG/DL (ref 0–150)
TSH SERPL DL<=0.005 MIU/L-ACNC: 2.62 UIU/ML (ref 0.27–4.2)
UREA NITROGEN (BUN): 13.2 MG/DL (ref 6–20)
UROBILINOGEN, URINE: NORMAL E.U./DL (ref 0–1)
WBC # BLD: 11.26 X 10 3/UL (ref 4.3–10.8)
WBC/HPF: NEGATIVE

## 2021-09-20 ENCOUNTER — PATIENT MESSAGE (OUTPATIENT)
Dept: FAMILY MEDICINE | Facility: CLINIC | Age: 41
End: 2021-09-20

## 2021-09-21 DIAGNOSIS — R94.4 DECREASED GFR: Primary | ICD-10-CM

## 2021-09-23 ENCOUNTER — OFFICE VISIT (OUTPATIENT)
Dept: HEMATOLOGY/ONCOLOGY | Facility: CLINIC | Age: 41
End: 2021-09-23
Payer: MEDICARE

## 2021-09-23 VITALS
WEIGHT: 267 LBS | SYSTOLIC BLOOD PRESSURE: 135 MMHG | DIASTOLIC BLOOD PRESSURE: 110 MMHG | HEIGHT: 60 IN | RESPIRATION RATE: 16 BRPM | OXYGEN SATURATION: 96 % | BODY MASS INDEX: 52.42 KG/M2 | HEART RATE: 90 BPM | TEMPERATURE: 98 F

## 2021-09-23 DIAGNOSIS — F41.1 GENERALIZED ANXIETY DISORDER: ICD-10-CM

## 2021-09-23 DIAGNOSIS — D51.3 OTHER DIETARY VITAMIN B12 DEFICIENCY ANEMIA: ICD-10-CM

## 2021-09-23 DIAGNOSIS — D64.9 ANEMIA, UNSPECIFIED TYPE: Primary | ICD-10-CM

## 2021-09-23 PROCEDURE — 1159F MED LIST DOCD IN RCRD: CPT | Mod: CPTII,S$GLB,, | Performed by: INTERNAL MEDICINE

## 2021-09-23 PROCEDURE — 99214 PR OFFICE/OUTPT VISIT, EST, LEVL IV, 30-39 MIN: ICD-10-PCS | Mod: S$GLB,,, | Performed by: INTERNAL MEDICINE

## 2021-09-23 PROCEDURE — 3080F DIAST BP >= 90 MM HG: CPT | Mod: CPTII,S$GLB,, | Performed by: INTERNAL MEDICINE

## 2021-09-23 PROCEDURE — 99214 OFFICE O/P EST MOD 30 MIN: CPT | Mod: S$GLB,,, | Performed by: INTERNAL MEDICINE

## 2021-09-23 PROCEDURE — 3044F HG A1C LEVEL LT 7.0%: CPT | Mod: CPTII,S$GLB,, | Performed by: INTERNAL MEDICINE

## 2021-09-23 PROCEDURE — 3008F PR BODY MASS INDEX (BMI) DOCUMENTED: ICD-10-PCS | Mod: CPTII,S$GLB,, | Performed by: INTERNAL MEDICINE

## 2021-09-23 PROCEDURE — 3080F PR MOST RECENT DIASTOLIC BLOOD PRESSURE >= 90 MM HG: ICD-10-PCS | Mod: CPTII,S$GLB,, | Performed by: INTERNAL MEDICINE

## 2021-09-23 PROCEDURE — 3008F BODY MASS INDEX DOCD: CPT | Mod: CPTII,S$GLB,, | Performed by: INTERNAL MEDICINE

## 2021-09-23 PROCEDURE — 3044F PR MOST RECENT HEMOGLOBIN A1C LEVEL <7.0%: ICD-10-PCS | Mod: CPTII,S$GLB,, | Performed by: INTERNAL MEDICINE

## 2021-09-23 PROCEDURE — 1159F PR MEDICATION LIST DOCUMENTED IN MEDICAL RECORD: ICD-10-PCS | Mod: CPTII,S$GLB,, | Performed by: INTERNAL MEDICINE

## 2021-09-23 PROCEDURE — 3075F SYST BP GE 130 - 139MM HG: CPT | Mod: CPTII,S$GLB,, | Performed by: INTERNAL MEDICINE

## 2021-09-23 PROCEDURE — 3075F PR MOST RECENT SYSTOLIC BLOOD PRESS GE 130-139MM HG: ICD-10-PCS | Mod: CPTII,S$GLB,, | Performed by: INTERNAL MEDICINE

## 2021-09-23 RX ORDER — IBUPROFEN 800 MG/1
TABLET ORAL
COMMUNITY
Start: 2021-08-11 | End: 2022-04-11 | Stop reason: ALTCHOICE

## 2021-09-23 RX ORDER — CLONAZEPAM 1 MG/1
1 TABLET ORAL 2 TIMES DAILY PRN
Qty: 60 TABLET | Refills: 0 | Status: SHIPPED | OUTPATIENT
Start: 2021-09-23 | End: 2021-10-24 | Stop reason: SDUPTHER

## 2021-10-08 ENCOUNTER — PATIENT MESSAGE (OUTPATIENT)
Dept: FAMILY MEDICINE | Facility: CLINIC | Age: 41
End: 2021-10-08

## 2021-10-08 DIAGNOSIS — M19.90 ARTHRITIS: ICD-10-CM

## 2021-10-08 RX ORDER — TRAMADOL HYDROCHLORIDE 50 MG/1
50 TABLET ORAL EVERY 6 HOURS PRN
Qty: 60 TABLET | Refills: 0 | Status: SHIPPED | OUTPATIENT
Start: 2021-10-08 | End: 2021-11-23 | Stop reason: SDUPTHER

## 2021-10-26 DIAGNOSIS — F41.1 GENERALIZED ANXIETY DISORDER: ICD-10-CM

## 2021-10-26 RX ORDER — CLONAZEPAM 1 MG/1
1 TABLET ORAL 2 TIMES DAILY PRN
Qty: 60 TABLET | Refills: 0 | Status: SHIPPED | OUTPATIENT
Start: 2021-10-26 | End: 2021-11-23 | Stop reason: SDUPTHER

## 2021-10-26 RX ORDER — CLONAZEPAM 1 MG/1
1 TABLET ORAL 2 TIMES DAILY PRN
Qty: 60 TABLET | Refills: 0 | OUTPATIENT
Start: 2021-10-26 | End: 2021-11-25

## 2021-11-02 ENCOUNTER — TELEPHONE (OUTPATIENT)
Dept: FAMILY MEDICINE | Facility: CLINIC | Age: 41
End: 2021-11-02
Payer: MEDICARE

## 2021-11-23 DIAGNOSIS — M19.90 ARTHRITIS: ICD-10-CM

## 2021-11-23 DIAGNOSIS — F41.1 GENERALIZED ANXIETY DISORDER: ICD-10-CM

## 2021-11-23 RX ORDER — CLONAZEPAM 1 MG/1
1 TABLET ORAL 2 TIMES DAILY PRN
Qty: 60 TABLET | Refills: 0 | Status: SHIPPED | OUTPATIENT
Start: 2021-11-23 | End: 2021-12-21 | Stop reason: SDUPTHER

## 2021-11-23 RX ORDER — TRAMADOL HYDROCHLORIDE 50 MG/1
50 TABLET ORAL EVERY 6 HOURS PRN
Qty: 60 TABLET | Refills: 0 | Status: SHIPPED | OUTPATIENT
Start: 2021-11-23 | End: 2021-12-21 | Stop reason: SDUPTHER

## 2021-12-06 ENCOUNTER — PATIENT MESSAGE (OUTPATIENT)
Dept: RESEARCH | Facility: HOSPITAL | Age: 41
End: 2021-12-06
Payer: MEDICARE

## 2021-12-08 ENCOUNTER — OFFICE VISIT (OUTPATIENT)
Dept: FAMILY MEDICINE | Facility: CLINIC | Age: 41
End: 2021-12-08
Payer: MEDICARE

## 2021-12-08 VITALS
OXYGEN SATURATION: 99 % | HEIGHT: 60 IN | TEMPERATURE: 99 F | BODY MASS INDEX: 49.87 KG/M2 | HEART RATE: 112 BPM | DIASTOLIC BLOOD PRESSURE: 67 MMHG | WEIGHT: 254 LBS | SYSTOLIC BLOOD PRESSURE: 139 MMHG

## 2021-12-08 DIAGNOSIS — R00.0 TACHYCARDIA: ICD-10-CM

## 2021-12-08 DIAGNOSIS — J06.9 VIRAL UPPER RESPIRATORY TRACT INFECTION: Primary | ICD-10-CM

## 2021-12-08 DIAGNOSIS — R05.9 COUGH: ICD-10-CM

## 2021-12-08 DIAGNOSIS — R50.81 FEVER IN OTHER DISEASES: ICD-10-CM

## 2021-12-08 LAB
CTP QC/QA: YES
CTP QC/QA: YES
POC MOLECULAR INFLUENZA A AGN: NEGATIVE
POC MOLECULAR INFLUENZA B AGN: NEGATIVE
SARS-COV-2 RDRP RESP QL NAA+PROBE: NEGATIVE

## 2021-12-08 PROCEDURE — 4010F ACE/ARB THERAPY RXD/TAKEN: CPT | Mod: CPTII,S$GLB,, | Performed by: PHYSICIAN ASSISTANT

## 2021-12-08 PROCEDURE — 99213 PR OFFICE/OUTPT VISIT, EST, LEVL III, 20-29 MIN: ICD-10-PCS | Mod: S$GLB,,, | Performed by: PHYSICIAN ASSISTANT

## 2021-12-08 PROCEDURE — U0002: ICD-10-PCS | Mod: QW,S$GLB,, | Performed by: PHYSICIAN ASSISTANT

## 2021-12-08 PROCEDURE — U0002 COVID-19 LAB TEST NON-CDC: HCPCS | Mod: QW,S$GLB,, | Performed by: PHYSICIAN ASSISTANT

## 2021-12-08 PROCEDURE — 4010F PR ACE/ARB THEARPY RXD/TAKEN: ICD-10-PCS | Mod: CPTII,S$GLB,, | Performed by: PHYSICIAN ASSISTANT

## 2021-12-08 PROCEDURE — 87502 POCT INFLUENZA A/B MOLECULAR: ICD-10-PCS | Mod: QW,,, | Performed by: PHYSICIAN ASSISTANT

## 2021-12-08 PROCEDURE — 99213 OFFICE O/P EST LOW 20 MIN: CPT | Mod: S$GLB,,, | Performed by: PHYSICIAN ASSISTANT

## 2021-12-08 PROCEDURE — 87502 INFLUENZA DNA AMP PROBE: CPT | Mod: QW,,, | Performed by: PHYSICIAN ASSISTANT

## 2021-12-08 RX ORDER — BENZONATATE 200 MG/1
200 CAPSULE ORAL 3 TIMES DAILY PRN
Qty: 21 CAPSULE | Refills: 0 | Status: SHIPPED | OUTPATIENT
Start: 2021-12-08 | End: 2021-12-18

## 2021-12-08 RX ORDER — PROMETHAZINE HYDROCHLORIDE AND DEXTROMETHORPHAN HYDROBROMIDE 6.25; 15 MG/5ML; MG/5ML
5 SYRUP ORAL EVERY 6 HOURS PRN
Qty: 105 ML | Refills: 0 | Status: SHIPPED | OUTPATIENT
Start: 2021-12-08 | End: 2021-12-15

## 2021-12-08 RX ORDER — PREDNISONE 20 MG/1
20 TABLET ORAL 2 TIMES DAILY
Qty: 8 TABLET | Refills: 0 | Status: SHIPPED | OUTPATIENT
Start: 2021-12-08 | End: 2021-12-12

## 2021-12-20 ENCOUNTER — OFFICE VISIT (OUTPATIENT)
Dept: FAMILY MEDICINE | Facility: CLINIC | Age: 41
End: 2021-12-20
Payer: MEDICARE

## 2021-12-20 VITALS
HEIGHT: 60 IN | OXYGEN SATURATION: 99 % | BODY MASS INDEX: 48.69 KG/M2 | RESPIRATION RATE: 17 BRPM | WEIGHT: 248 LBS | DIASTOLIC BLOOD PRESSURE: 86 MMHG | HEART RATE: 100 BPM | SYSTOLIC BLOOD PRESSURE: 141 MMHG

## 2021-12-20 DIAGNOSIS — J06.9 VIRAL UPPER RESPIRATORY TRACT INFECTION: Primary | ICD-10-CM

## 2021-12-20 DIAGNOSIS — R00.0 TACHYCARDIA: ICD-10-CM

## 2021-12-20 DIAGNOSIS — R05.9 COUGH: ICD-10-CM

## 2021-12-20 PROCEDURE — 99214 OFFICE O/P EST MOD 30 MIN: CPT | Mod: S$GLB,,, | Performed by: PHYSICIAN ASSISTANT

## 2021-12-20 PROCEDURE — 99214 PR OFFICE/OUTPT VISIT, EST, LEVL IV, 30-39 MIN: ICD-10-PCS | Mod: S$GLB,,, | Performed by: PHYSICIAN ASSISTANT

## 2021-12-20 PROCEDURE — 4010F PR ACE/ARB THEARPY RXD/TAKEN: ICD-10-PCS | Mod: CPTII,S$GLB,, | Performed by: PHYSICIAN ASSISTANT

## 2021-12-20 PROCEDURE — 4010F ACE/ARB THERAPY RXD/TAKEN: CPT | Mod: CPTII,S$GLB,, | Performed by: PHYSICIAN ASSISTANT

## 2021-12-20 RX ORDER — AZITHROMYCIN 250 MG/1
TABLET, FILM COATED ORAL
Qty: 6 TABLET | Refills: 0 | Status: SHIPPED | OUTPATIENT
Start: 2021-12-20 | End: 2022-01-05

## 2021-12-20 RX ORDER — BENZONATATE 200 MG/1
200 CAPSULE ORAL 3 TIMES DAILY PRN
Qty: 21 CAPSULE | Refills: 0 | Status: SHIPPED | OUTPATIENT
Start: 2021-12-20 | End: 2021-12-30

## 2021-12-20 RX ORDER — PROMETHAZINE HYDROCHLORIDE AND DEXTROMETHORPHAN HYDROBROMIDE 6.25; 15 MG/5ML; MG/5ML
5 SYRUP ORAL EVERY 6 HOURS PRN
Qty: 120 ML | Refills: 0 | Status: SHIPPED | OUTPATIENT
Start: 2021-12-20 | End: 2021-12-27

## 2021-12-21 DIAGNOSIS — F41.1 GENERALIZED ANXIETY DISORDER: ICD-10-CM

## 2021-12-21 DIAGNOSIS — M19.90 ARTHRITIS: ICD-10-CM

## 2021-12-22 ENCOUNTER — PATIENT MESSAGE (OUTPATIENT)
Dept: FAMILY MEDICINE | Facility: CLINIC | Age: 41
End: 2021-12-22
Payer: MEDICARE

## 2021-12-22 RX ORDER — CLONAZEPAM 1 MG/1
1 TABLET ORAL 2 TIMES DAILY PRN
Qty: 60 TABLET | Refills: 0 | Status: SHIPPED | OUTPATIENT
Start: 2021-12-22 | End: 2022-01-22 | Stop reason: SDUPTHER

## 2021-12-22 RX ORDER — TRAMADOL HYDROCHLORIDE 50 MG/1
50 TABLET ORAL EVERY 6 HOURS PRN
Qty: 60 TABLET | Refills: 0 | Status: SHIPPED | OUTPATIENT
Start: 2021-12-22 | End: 2021-12-29

## 2022-01-05 ENCOUNTER — OFFICE VISIT (OUTPATIENT)
Dept: OBSTETRICS AND GYNECOLOGY | Facility: CLINIC | Age: 42
End: 2022-01-05
Payer: MEDICARE

## 2022-01-05 VITALS
WEIGHT: 256.38 LBS | HEIGHT: 60 IN | DIASTOLIC BLOOD PRESSURE: 84 MMHG | BODY MASS INDEX: 50.33 KG/M2 | HEART RATE: 106 BPM | SYSTOLIC BLOOD PRESSURE: 146 MMHG

## 2022-01-05 DIAGNOSIS — Z01.419 ENCOUNTER FOR ANNUAL ROUTINE GYNECOLOGICAL EXAMINATION: Primary | ICD-10-CM

## 2022-01-05 DIAGNOSIS — E66.01 MORBIDLY OBESE: ICD-10-CM

## 2022-01-05 DIAGNOSIS — Z12.31 VISIT FOR SCREENING MAMMOGRAM: ICD-10-CM

## 2022-01-05 DIAGNOSIS — Z97.5 NEXPLANON IN PLACE: ICD-10-CM

## 2022-01-05 PROCEDURE — 1159F MED LIST DOCD IN RCRD: CPT | Mod: CPTII,S$GLB,, | Performed by: NURSE PRACTITIONER

## 2022-01-05 PROCEDURE — 3079F DIAST BP 80-89 MM HG: CPT | Mod: CPTII,S$GLB,, | Performed by: NURSE PRACTITIONER

## 2022-01-05 PROCEDURE — 1159F PR MEDICATION LIST DOCUMENTED IN MEDICAL RECORD: ICD-10-PCS | Mod: CPTII,S$GLB,, | Performed by: NURSE PRACTITIONER

## 2022-01-05 PROCEDURE — 1160F PR REVIEW ALL MEDS BY PRESCRIBER/CLIN PHARMACIST DOCUMENTED: ICD-10-PCS | Mod: CPTII,S$GLB,, | Performed by: NURSE PRACTITIONER

## 2022-01-05 PROCEDURE — 3008F BODY MASS INDEX DOCD: CPT | Mod: CPTII,S$GLB,, | Performed by: NURSE PRACTITIONER

## 2022-01-05 PROCEDURE — 3077F PR MOST RECENT SYSTOLIC BLOOD PRESSURE >= 140 MM HG: ICD-10-PCS | Mod: CPTII,S$GLB,, | Performed by: NURSE PRACTITIONER

## 2022-01-05 PROCEDURE — G0101 PR CA SCREEN;PELVIC/BREAST EXAM: ICD-10-PCS | Mod: S$GLB,,, | Performed by: NURSE PRACTITIONER

## 2022-01-05 PROCEDURE — 3008F PR BODY MASS INDEX (BMI) DOCUMENTED: ICD-10-PCS | Mod: CPTII,S$GLB,, | Performed by: NURSE PRACTITIONER

## 2022-01-05 PROCEDURE — 1160F RVW MEDS BY RX/DR IN RCRD: CPT | Mod: CPTII,S$GLB,, | Performed by: NURSE PRACTITIONER

## 2022-01-05 PROCEDURE — 3079F PR MOST RECENT DIASTOLIC BLOOD PRESSURE 80-89 MM HG: ICD-10-PCS | Mod: CPTII,S$GLB,, | Performed by: NURSE PRACTITIONER

## 2022-01-05 PROCEDURE — G0101 CA SCREEN;PELVIC/BREAST EXAM: HCPCS | Mod: S$GLB,,, | Performed by: NURSE PRACTITIONER

## 2022-01-05 PROCEDURE — 3077F SYST BP >= 140 MM HG: CPT | Mod: CPTII,S$GLB,, | Performed by: NURSE PRACTITIONER

## 2022-01-05 RX ORDER — CLINDAMYCIN PHOSPHATE 10 MG/G
GEL TOPICAL
COMMUNITY
Start: 2021-10-29 | End: 2022-08-17

## 2022-01-05 RX ORDER — DOXYCYCLINE 100 MG/1
CAPSULE ORAL
COMMUNITY
Start: 2021-10-19 | End: 2022-04-11 | Stop reason: ALTCHOICE

## 2022-01-05 NOTE — PROGRESS NOTES
Subjective:       Patient ID: Erica Law is a 41 y.o. female.    Chief Complaint:  Well Woman and Contraception      History of Present Illness  Pt here for gyn annual.  History and past labs reviewed with patient.    Complaints: WWE, Follow up on nexplanon, will  in 7 months and would like to stay with this form until expiration.       Review of Systems  Review of Systems   Constitutional: Negative for appetite change and fatigue.   HENT: Negative for tinnitus.    Cardiovascular: Negative for chest pain and palpitations.   Gastrointestinal: Negative for abdominal pain, bloating, constipation, vomiting and reflux.   Endocrine: Negative for diabetes, hyperthyroidism and hypothyroidism.   Genitourinary: Negative for flank pain, menstrual problem, pelvic pain and postcoital bleeding.   Musculoskeletal: Negative for back pain and myalgias.   Integumentary:  Negative for rash, mole/lesion, breast mass and breast tenderness.   Neurological: Negative for vertigo, syncope and headaches.   Psychiatric/Behavioral: Negative for depression and sleep disturbance. The patient is not nervous/anxious.    Breast: Negative for lump, mass, mastodynia and tenderness          Objective:     Vitals:    22 1521   BP: (!) 146/84   Pulse: 106   Weight: 116.3 kg (256 lb 6 oz)   Height: 5' (1.524 m)        Physical Exam:   Constitutional: She is oriented to person, place, and time. She appears well-developed and well-nourished.    HENT:   Head: Normocephalic and atraumatic.    Eyes: Pupils are equal, round, and reactive to light. Conjunctivae are normal.    Neck: No thyromegaly present.    Cardiovascular: Normal rate, regular rhythm and normal heart sounds.  Exam reveals no clubbing, no cyanosis and no edema.     Pulmonary/Chest: Effort normal and breath sounds normal.        Abdominal: Soft. She exhibits no distension. Hernia confirmed negative in the right inguinal area and confirmed negative in the left inguinal area.      Genitourinary:    Vagina and uterus normal.      Pelvic exam was performed with patient supine.   There is no rash, tenderness, lesion or injury on the right labia. There is no rash, tenderness, lesion or injury on the left labia. Cervix is normal. Right adnexum displays no mass, no tenderness and no fullness. Left adnexum displays no mass, no tenderness and no fullness. Cervix exhibits no discharge.           Musculoskeletal: Normal range of motion and moves all extremeties.      Lymphadenopathy:        Right: No inguinal adenopathy present.        Left: No inguinal adenopathy present.    Neurological: She is alert and oriented to person, place, and time.    Skin: Skin is warm and dry. No cyanosis. Nails show no clubbing.    Psychiatric: She has a normal mood and affect. Her behavior is normal.      breast exam wnl- no nipple dc, skin changes, masses or lymph nodes palpated bilaterally    Assessment:     1. Encounter for annual routine gynecological examination    2. Morbidly obese    3. Nexplanon in place    4. Visit for screening mammogram              Plan:       Encounter for annual routine gynecological examination  -     Liquid-based pap smear, screening    Morbidly obese    Nexplanon in place    Visit for screening mammogram  -     Mammo Digital Screening Bilat; Future; Expected date: 01/05/2022     apt to remove in Oct 2022    Follow up in about 1 year (around 1/5/2023).

## 2022-01-18 ENCOUNTER — PATIENT MESSAGE (OUTPATIENT)
Dept: FAMILY MEDICINE | Facility: CLINIC | Age: 42
End: 2022-01-18
Payer: MEDICARE

## 2022-01-19 DIAGNOSIS — F32.A DEPRESSION, UNSPECIFIED DEPRESSION TYPE: ICD-10-CM

## 2022-01-19 RX ORDER — TRAMADOL HYDROCHLORIDE 50 MG/1
50 TABLET ORAL EVERY 4 HOURS PRN
Qty: 30 TABLET | Refills: 0 | Status: SHIPPED | OUTPATIENT
Start: 2022-01-19 | End: 2022-01-26

## 2022-01-19 RX ORDER — ESCITALOPRAM OXALATE 10 MG/1
10 TABLET ORAL DAILY
Qty: 90 TABLET | Refills: 1 | Status: SHIPPED | OUTPATIENT
Start: 2022-01-19 | End: 2022-07-20

## 2022-01-22 DIAGNOSIS — F41.1 GENERALIZED ANXIETY DISORDER: ICD-10-CM

## 2022-01-24 ENCOUNTER — TELEPHONE (OUTPATIENT)
Dept: HEMATOLOGY/ONCOLOGY | Facility: CLINIC | Age: 42
End: 2022-01-24
Payer: MEDICARE

## 2022-01-24 NOTE — TELEPHONE ENCOUNTER
----- Message from Luiza Ralph sent at 1/24/2022  9:03 AM CST -----  Contact: PT      Who Called: Erica     Does the patient know what this is regarding?: pt would like to [facility] 01[facility]25/2022   Would the patient rather a call back or a response via [facility]?  Callback   Best Call Back Number: .[phone] (home)      Additional Information:

## 2022-01-25 RX ORDER — CLONAZEPAM 1 MG/1
1 TABLET ORAL 2 TIMES DAILY PRN
Qty: 60 TABLET | Refills: 1 | Status: SHIPPED | OUTPATIENT
Start: 2022-01-25 | End: 2022-03-31 | Stop reason: SDUPTHER

## 2022-01-27 ENCOUNTER — OFFICE VISIT (OUTPATIENT)
Dept: HEMATOLOGY/ONCOLOGY | Facility: CLINIC | Age: 42
End: 2022-01-27
Payer: MEDICARE

## 2022-01-27 VITALS
BODY MASS INDEX: 50.06 KG/M2 | TEMPERATURE: 99 F | DIASTOLIC BLOOD PRESSURE: 92 MMHG | RESPIRATION RATE: 16 BRPM | OXYGEN SATURATION: 97 % | WEIGHT: 255 LBS | HEIGHT: 60 IN | SYSTOLIC BLOOD PRESSURE: 149 MMHG | HEART RATE: 96 BPM

## 2022-01-27 DIAGNOSIS — D64.9 ANEMIA, UNSPECIFIED TYPE: Primary | ICD-10-CM

## 2022-01-27 PROCEDURE — 1159F MED LIST DOCD IN RCRD: CPT | Mod: CPTII,S$GLB,, | Performed by: INTERNAL MEDICINE

## 2022-01-27 PROCEDURE — 3077F PR MOST RECENT SYSTOLIC BLOOD PRESSURE >= 140 MM HG: ICD-10-PCS | Mod: CPTII,S$GLB,, | Performed by: INTERNAL MEDICINE

## 2022-01-27 PROCEDURE — 3008F BODY MASS INDEX DOCD: CPT | Mod: CPTII,S$GLB,, | Performed by: INTERNAL MEDICINE

## 2022-01-27 PROCEDURE — 3077F SYST BP >= 140 MM HG: CPT | Mod: CPTII,S$GLB,, | Performed by: INTERNAL MEDICINE

## 2022-01-27 PROCEDURE — 4010F ACE/ARB THERAPY RXD/TAKEN: CPT | Mod: CPTII,S$GLB,, | Performed by: INTERNAL MEDICINE

## 2022-01-27 PROCEDURE — 99214 OFFICE O/P EST MOD 30 MIN: CPT | Mod: S$GLB,,, | Performed by: INTERNAL MEDICINE

## 2022-01-27 PROCEDURE — 3080F DIAST BP >= 90 MM HG: CPT | Mod: CPTII,S$GLB,, | Performed by: INTERNAL MEDICINE

## 2022-01-27 PROCEDURE — 4010F PR ACE/ARB THEARPY RXD/TAKEN: ICD-10-PCS | Mod: CPTII,S$GLB,, | Performed by: INTERNAL MEDICINE

## 2022-01-27 PROCEDURE — 3080F PR MOST RECENT DIASTOLIC BLOOD PRESSURE >= 90 MM HG: ICD-10-PCS | Mod: CPTII,S$GLB,, | Performed by: INTERNAL MEDICINE

## 2022-01-27 PROCEDURE — 1159F PR MEDICATION LIST DOCUMENTED IN MEDICAL RECORD: ICD-10-PCS | Mod: CPTII,S$GLB,, | Performed by: INTERNAL MEDICINE

## 2022-01-27 PROCEDURE — 3008F PR BODY MASS INDEX (BMI) DOCUMENTED: ICD-10-PCS | Mod: CPTII,S$GLB,, | Performed by: INTERNAL MEDICINE

## 2022-01-27 PROCEDURE — 99214 PR OFFICE/OUTPT VISIT, EST, LEVL IV, 30-39 MIN: ICD-10-PCS | Mod: S$GLB,,, | Performed by: INTERNAL MEDICINE

## 2022-01-27 RX ORDER — LISINOPRIL 5 MG/1
TABLET ORAL
COMMUNITY
Start: 2021-11-12 | End: 2022-04-11 | Stop reason: ALTCHOICE

## 2022-01-27 RX ORDER — DOXYCYCLINE 100 MG/1
CAPSULE ORAL
COMMUNITY
Start: 2022-01-12 | End: 2022-03-31

## 2022-01-27 NOTE — PROGRESS NOTES
HEMATOLOGY FOLLOW UP CONSULTATION NOTE    Patient ID: Erica Law is a 41 y.o. female.    Chief Complaint: Anemia    HPI:  Patient is a 40-year-old female with past medical history of DM, Obesity, anxiety, HTN, Hypothyroidism, positive SHELLY, Arthritis who is referred by her primary care provider for evaluation of anemia.  Patient voices no acute complaints and denies any history of abnormal bleeding, blood in the stools.      Labs:  Lab Results   Component Value Date    WBC 11.26 (H) 09/17/2021    HGB 10.9 (L) 09/17/2021    HCT 35.7 (L) 09/17/2021    MCV 91.3 09/17/2021    LABPLAT 296 06/30/2021      Platelets   Date Value Ref Range Status   06/30/2021 296 142 - 424 10*3/uL Final     CMP  Sodium   Date Value Ref Range Status   09/17/2021 137 136 - 145 mmol/L Final     Potassium   Date Value Ref Range Status   09/17/2021 4.1 3.5 - 5.1 mmol/L Final     Chloride   Date Value Ref Range Status   09/17/2021 105 98 - 107 mmol/L Final     CO2   Date Value Ref Range Status   09/17/2021 21 (L) 22 - 29 mmol/L Final     Glucose   Date Value Ref Range Status   06/30/2021 131 (H) 70 - 110 mg/dL Final     BUN   Date Value Ref Range Status   09/17/2021 13.2 6 - 20 mg/dL Final     Creatinine   Date Value Ref Range Status   09/17/2021 1.24 (H) 0.50 - 0.90 mg/dL Final     Comment:     Recommend repeat creatinine within 90 days.     Calcium   Date Value Ref Range Status   09/17/2021 9.6 8.6 - 10.2 mg/dL Final     Total Protein   Date Value Ref Range Status   07/01/2020 10.2 (H) 6.0 - 8.4 g/dL Final     Albumin   Date Value Ref Range Status   09/17/2021 4.0 3.5 - 5.2 g/dL Final     Total Bilirubin   Date Value Ref Range Status   07/01/2020 0.4 0.1 - 1.0 mg/dL Final     Comment:     For infants and newborns, interpretation of results should be based  on gestational age, weight and in agreement with clinical  observations.  Premature Infant recommended reference ranges:  Up to 24 hours.............<8.0 mg/dL  Up to 48  hours............<12.0 mg/dL  3-5 days..................<15.0 mg/dL  6-29 days.................<15.0 mg/dL       Alkaline Phosphatase   Date Value Ref Range Status   2020 76 55 - 135 U/L Final     AST   Date Value Ref Range Status   2021 16 0 - 32 U/L Final     ALT   Date Value Ref Range Status   2020 12 10 - 44 U/L Final     Anion Gap   Date Value Ref Range Status   2021 11.0 8.0 - 17.0 mmol/L Final     Comment:     NOTE  Testing performed at:  [facility], [address] [place] [address] CLIA #:[id number]       eGFR if    Date Value Ref Range Status   2020 >60.0 >60 mL/min/1.73 m^2 Final     eGFR if non    Date Value Ref Range Status   2020 >60.0 >60 mL/min/1.73 m^2 Final     Comment:     Calculation used to obtain the estimated glomerular filtration  rate (eGFR) is the CKD-EPI equation.                   Past Medical History:   Diagnosis Date    Anxiety     Diabetes mellitus     Hydradenitis     Hypertension     Staph infection 2001    Thyroid disease        Family History   Problem Relation Age of Onset    Diabetes Mellitus Mother     Cancer Maternal Grandmother     Cancer Maternal Grandfather        Social History     Socioeconomic History    Marital status: Single    Number of children: 1   Tobacco Use    Smoking status: Former Smoker     Packs/day: 0.50     Years: 1.00     Pack years: 0.50     Types: Vaping with nicotine, Cigarettes     Quit date: 2016     Years since quittin.3    Smokeless tobacco: Never Used    Tobacco comment: pt on medications to stop smoking    Substance and Sexual Activity    Alcohol use: Not Currently    Drug use: Never    Sexual activity: Not Currently     Partners: Male     Birth control/protection: Implant         Past Surgical History:   Procedure Laterality Date    ABCESS DRAINAGE      from the breast     BREAST CYST EXCISION       SECTION             Review  of systems:    Review of Systems   Constitutional: Negative for activity change, appetite change, chills, diaphoresis, fatigue and unexpected weight change.   HENT: Negative for congestion, facial swelling, hearing loss, mouth sores, trouble swallowing and voice change.    Eyes: Negative for photophobia, pain, discharge and itching.   Respiratory: Negative for apnea, cough, choking, chest tightness and shortness of breath.    Cardiovascular: Negative for chest pain, palpitations and leg swelling.   Gastrointestinal: Negative for abdominal distention, abdominal pain, anal bleeding and blood in stool.   Endocrine: Negative for cold intolerance, heat intolerance, polydipsia and polyphagia.   Genitourinary: Negative for difficulty urinating, dysuria, flank pain and hematuria.   Musculoskeletal: Negative for arthralgias, back pain, joint swelling, myalgias, neck pain and neck stiffness.   Skin: Negative for color change, pallor and wound.   Allergic/Immunologic: Negative for environmental allergies, food allergies and immunocompromised state.   Neurological: Negative for dizziness, seizures, facial asymmetry, speech difficulty, light-headedness, numbness and headaches.   Hematological: Negative for adenopathy. Does not bruise/bleed easily.   Psychiatric/Behavioral: Negative for agitation, behavioral problems, confusion, decreased concentration and sleep disturbance.         Physical Exam  Vitals and nursing note reviewed.   Constitutional:       General: She is not in acute distress.     Appearance: Normal appearance. She is not ill-appearing.   HENT:      Head: Normocephalic and atraumatic.      Nose: No congestion or rhinorrhea.   Eyes:      General: No scleral icterus.     Extraocular Movements: Extraocular movements intact.      Pupils: Pupils are equal, round, and reactive to light.   Cardiovascular:      Rate and Rhythm: Normal rate and regular rhythm.      Pulses: Normal pulses.      Heart sounds: Normal heart  sounds. No murmur heard.  No gallop.    Pulmonary:      Effort: Pulmonary effort is normal. No respiratory distress.      Breath sounds: Normal breath sounds. No stridor. No wheezing or rhonchi.   Abdominal:      General: Bowel sounds are normal. There is no distension.      Palpations: There is no mass.      Tenderness: There is no abdominal tenderness. There is no guarding.   Musculoskeletal:         General: No swelling, tenderness, deformity or signs of injury. Normal range of motion.      Cervical back: Normal range of motion and neck supple. No rigidity. No muscular tenderness.      Right lower leg: No edema.      Left lower leg: No edema.   Skin:     General: Skin is warm.      Coloration: Skin is not jaundiced or pale.      Findings: No bruising or lesion.   Neurological:      General: No focal deficit present.      Mental Status: She is alert and oriented to person, place, and time.      Cranial Nerves: No cranial nerve deficit.      Sensory: No sensory deficit.      Motor: No weakness.      Gait: Gait normal.   Psychiatric:         Mood and Affect: Mood normal.         Behavior: Behavior normal.         Thought Content: Thought content normal.       Vitals:    01/27/22 1317   Resp: 16   Body surface area is 2.21 meters squared.        Assessment/Plan:      1. Normochromic Anemia:     == Normocytic normochromic noted on labs done 3 weeks back.  Will obtain repeat CBC and also obtain iron profile and ferritin.  Discussed with her the etiology for normocytic anemia in the young females. Also discussed with her about anemia of inflammation. Looking at her records this could be due to inflammatory process due to her multiple medical co-morbidities. Would recommend to check B12 in future as well  == In the interim would recommend her to take PO iron supplementation and to take Vit C to enhance absorption but I feel we are likely looking at anemia of chronic disease looking at labs we have so far. Discussed this  with patient who voices understanding.  == 9/23/2021:  Improvement in hemoglobin noted from 8.6 to 10.9. She has been taking Iron with Vit C and that has helped with her improved counts. Advised to continue at this time. She also has hypothyroidism and her last TSH levels are borderline low. We will also check her b12 levels at the return visit for completion. But I feel all these factors likely contributing to her anemia.  == 1/27/2022:  Labs from today showed stable hemoglobin at 10.4 which was 10.9 previously.  Normal iron and TIBC along with normal B12 level.  Reports she has been taking her thyroid supplementation regularly.  And a would recommend that she continues to take p.o. iron supplementation along with vitamin-C.  Will defer IV iron supplementation at this time    2. Leucocytosis:    == Seems to be reactive. Normal differential. Continue observation. If continued elevation in future would obtain Flow cytometry.    3. Preventive Medicine:    == Reports being upto date. Follows closely with PCP      RTC in 4 months for MD visit with labs: CBC for f/up    Total time spent in counseling and discussion about further management options including relevant lab work, treatment,  prognosis, medications and intended side effects was more than 25 minutes. More than 50 % of the time was spent in counseling and coordination of care.  This includes face to face time and non-face to face time preparing to see the patient (eg, review of tests), Obtaining and/or reviewing separately obtained history, Documenting clinical information in the electronic or other health record, Independently interpreting resultsand communicating results to the patient/family/caregiver, or Care coordination.

## 2022-01-27 NOTE — LETTER
January 27, 2022        Jill Palacios MD  [address]  [address]  [place] [address]             [facility]  [address]  [place] [address]  Phone: [phone]  Fax: [phone]   Patient: Erica Law   MR Number: [id number]   YOB: 1980   Date of Visit: 1/27/2022       Dear Dr. Palacios:    Thank you for referring Erica Law to me for evaluation. Attached you will find relevant portions of my assessment and plan of care.    If you have questions, please do not hesitate to call me. I look forward to following Erica Law along with you.    Sincerely,      Eliazar Burgos MD            CC  No Recipients    Enclosure

## 2022-02-01 ENCOUNTER — PATIENT MESSAGE (OUTPATIENT)
Dept: HEMATOLOGY/ONCOLOGY | Facility: CLINIC | Age: 42
End: 2022-02-01
Payer: MEDICARE

## 2022-02-01 ENCOUNTER — PATIENT MESSAGE (OUTPATIENT)
Dept: OBSTETRICS AND GYNECOLOGY | Facility: CLINIC | Age: 42
End: 2022-02-01
Payer: MEDICARE

## 2022-02-01 NOTE — TELEPHONE ENCOUNTER
Yes. Please let her know that we have forwarded results to Dr Jill Palacios to evaluate dose reduction. She can contact her office to discuss this further. Thanks

## 2022-02-04 ENCOUNTER — OFFICE VISIT (OUTPATIENT)
Dept: OBSTETRICS AND GYNECOLOGY | Facility: CLINIC | Age: 42
End: 2022-02-04
Payer: MEDICARE

## 2022-02-04 VITALS
WEIGHT: 258.81 LBS | HEIGHT: 60 IN | BODY MASS INDEX: 50.81 KG/M2 | DIASTOLIC BLOOD PRESSURE: 73 MMHG | SYSTOLIC BLOOD PRESSURE: 110 MMHG | HEART RATE: 97 BPM

## 2022-02-04 DIAGNOSIS — Z30.46 NEXPLANON REMOVAL: Primary | ICD-10-CM

## 2022-02-04 DIAGNOSIS — N92.1 MENORRHAGIA WITH IRREGULAR CYCLE: ICD-10-CM

## 2022-02-04 DIAGNOSIS — D64.9 ANEMIA, UNSPECIFIED TYPE: ICD-10-CM

## 2022-02-04 PROCEDURE — 3008F BODY MASS INDEX DOCD: CPT | Mod: CPTII,S$GLB,, | Performed by: NURSE PRACTITIONER

## 2022-02-04 PROCEDURE — 1159F PR MEDICATION LIST DOCUMENTED IN MEDICAL RECORD: ICD-10-PCS | Mod: CPTII,S$GLB,, | Performed by: NURSE PRACTITIONER

## 2022-02-04 PROCEDURE — 99499 NO LOS: ICD-10-PCS | Mod: S$GLB,,, | Performed by: NURSE PRACTITIONER

## 2022-02-04 PROCEDURE — 99499 UNLISTED E&M SERVICE: CPT | Mod: S$GLB,,, | Performed by: NURSE PRACTITIONER

## 2022-02-04 PROCEDURE — 4010F PR ACE/ARB THEARPY RXD/TAKEN: ICD-10-PCS | Mod: CPTII,S$GLB,, | Performed by: NURSE PRACTITIONER

## 2022-02-04 PROCEDURE — 3008F PR BODY MASS INDEX (BMI) DOCUMENTED: ICD-10-PCS | Mod: CPTII,S$GLB,, | Performed by: NURSE PRACTITIONER

## 2022-02-04 PROCEDURE — 3074F PR MOST RECENT SYSTOLIC BLOOD PRESSURE < 130 MM HG: ICD-10-PCS | Mod: CPTII,S$GLB,, | Performed by: NURSE PRACTITIONER

## 2022-02-04 PROCEDURE — 4010F ACE/ARB THERAPY RXD/TAKEN: CPT | Mod: CPTII,S$GLB,, | Performed by: NURSE PRACTITIONER

## 2022-02-04 PROCEDURE — 3078F PR MOST RECENT DIASTOLIC BLOOD PRESSURE < 80 MM HG: ICD-10-PCS | Mod: CPTII,S$GLB,, | Performed by: NURSE PRACTITIONER

## 2022-02-04 PROCEDURE — 3078F DIAST BP <80 MM HG: CPT | Mod: CPTII,S$GLB,, | Performed by: NURSE PRACTITIONER

## 2022-02-04 PROCEDURE — 3074F SYST BP LT 130 MM HG: CPT | Mod: CPTII,S$GLB,, | Performed by: NURSE PRACTITIONER

## 2022-02-04 PROCEDURE — 11982 PR REMOVAL DRUG IMPLANT DEVICE: ICD-10-PCS | Mod: S$GLB,,, | Performed by: NURSE PRACTITIONER

## 2022-02-04 PROCEDURE — 11982 REMOVE DRUG IMPLANT DEVICE: CPT | Mod: S$GLB,,, | Performed by: NURSE PRACTITIONER

## 2022-02-04 PROCEDURE — 1160F PR REVIEW ALL MEDS BY PRESCRIBER/CLIN PHARMACIST DOCUMENTED: ICD-10-PCS | Mod: CPTII,S$GLB,, | Performed by: NURSE PRACTITIONER

## 2022-02-04 PROCEDURE — 1159F MED LIST DOCD IN RCRD: CPT | Mod: CPTII,S$GLB,, | Performed by: NURSE PRACTITIONER

## 2022-02-04 PROCEDURE — 1160F RVW MEDS BY RX/DR IN RCRD: CPT | Mod: CPTII,S$GLB,, | Performed by: NURSE PRACTITIONER

## 2022-02-04 RX ORDER — LEVONORGESTREL AND ETHINYL ESTRADIOL 0.15-0.03
1 KIT ORAL DAILY
Qty: 30 TABLET | Refills: 11 | Status: SHIPPED | OUTPATIENT
Start: 2022-02-04 | End: 2022-08-02

## 2022-02-04 NOTE — PROGRESS NOTES
PROCEDURE NOTE- Nexplanon removal    CC: Nexplanon removal    Reason for removal:    Future plan for contraception:     Review of patient's allergies indicates:  No Known Allergies    Patient was counseled on risks, benefits and alternatives to Nexplanon removal. All of her questions were answered, consent was given and the patient wishes to proceed with removal.    TIME OUT PERFORMED:  mplant palpated in the medial area of the left upper upper arm and then cleaned with betadine. Area injected with 1% lidocaine. Area then cleaned with alcohol. Distal end of Nexplanon pushed toward the skin surface to tent up the tissue. 2 mm stab incision made and the implant was removed.  Nexplanon was noted to be intact and shown to patient. Area cleaned, benzoin placed and then steristrips were placed over incisions site. A nonstick piece of Telfa and a pressure dressing placed and postop care discussed. Patient tolerated the procedure well without any complications    POST PROCEDURE COUNSELING:  Manage post Nexplanon removal arm pain with NSAIDs or tylenol  Keep arm elevated and apply intermittent ice packs to decrease pain and bruising for 24 Hours.  May remove bandage in 24 hours.  Keep bandage dry for 24 hours-if it inadvertantly gets wet, remove, dry arm, and can replace.  If any worsening pain at insertion site, bleeding through bandage, redness and/or pus drainage at insertion site, please contact our office immediately.    ASSESSMENT/ PLAN:  Nexplanon removal         FOLLOWUP:  No follow-ups on file.

## 2022-02-07 ENCOUNTER — PATIENT MESSAGE (OUTPATIENT)
Dept: FAMILY MEDICINE | Facility: CLINIC | Age: 42
End: 2022-02-07
Payer: MEDICARE

## 2022-02-10 ENCOUNTER — OFFICE VISIT (OUTPATIENT)
Dept: FAMILY MEDICINE | Facility: CLINIC | Age: 42
End: 2022-02-10
Payer: MEDICARE

## 2022-02-10 VITALS
BODY MASS INDEX: 50.45 KG/M2 | WEIGHT: 257 LBS | HEIGHT: 60 IN | OXYGEN SATURATION: 100 % | RESPIRATION RATE: 16 BRPM | DIASTOLIC BLOOD PRESSURE: 79 MMHG | SYSTOLIC BLOOD PRESSURE: 133 MMHG | HEART RATE: 104 BPM

## 2022-02-10 DIAGNOSIS — E03.9 HYPOTHYROIDISM, UNSPECIFIED TYPE: Primary | ICD-10-CM

## 2022-02-10 PROCEDURE — 3075F SYST BP GE 130 - 139MM HG: CPT | Mod: CPTII,S$GLB,, | Performed by: PHYSICIAN ASSISTANT

## 2022-02-10 PROCEDURE — 4010F ACE/ARB THERAPY RXD/TAKEN: CPT | Mod: CPTII,S$GLB,, | Performed by: PHYSICIAN ASSISTANT

## 2022-02-10 PROCEDURE — 3075F PR MOST RECENT SYSTOLIC BLOOD PRESS GE 130-139MM HG: ICD-10-PCS | Mod: CPTII,S$GLB,, | Performed by: PHYSICIAN ASSISTANT

## 2022-02-10 PROCEDURE — 4010F PR ACE/ARB THEARPY RXD/TAKEN: ICD-10-PCS | Mod: CPTII,S$GLB,, | Performed by: PHYSICIAN ASSISTANT

## 2022-02-10 PROCEDURE — 1160F RVW MEDS BY RX/DR IN RCRD: CPT | Mod: CPTII,S$GLB,, | Performed by: PHYSICIAN ASSISTANT

## 2022-02-10 PROCEDURE — 1160F PR REVIEW ALL MEDS BY PRESCRIBER/CLIN PHARMACIST DOCUMENTED: ICD-10-PCS | Mod: CPTII,S$GLB,, | Performed by: PHYSICIAN ASSISTANT

## 2022-02-10 PROCEDURE — 3078F PR MOST RECENT DIASTOLIC BLOOD PRESSURE < 80 MM HG: ICD-10-PCS | Mod: CPTII,S$GLB,, | Performed by: PHYSICIAN ASSISTANT

## 2022-02-10 PROCEDURE — 3078F DIAST BP <80 MM HG: CPT | Mod: CPTII,S$GLB,, | Performed by: PHYSICIAN ASSISTANT

## 2022-02-10 PROCEDURE — 1159F MED LIST DOCD IN RCRD: CPT | Mod: CPTII,S$GLB,, | Performed by: PHYSICIAN ASSISTANT

## 2022-02-10 PROCEDURE — 1159F PR MEDICATION LIST DOCUMENTED IN MEDICAL RECORD: ICD-10-PCS | Mod: CPTII,S$GLB,, | Performed by: PHYSICIAN ASSISTANT

## 2022-02-10 PROCEDURE — 99213 PR OFFICE/OUTPT VISIT, EST, LEVL III, 20-29 MIN: ICD-10-PCS | Mod: S$GLB,,, | Performed by: PHYSICIAN ASSISTANT

## 2022-02-10 PROCEDURE — 3008F BODY MASS INDEX DOCD: CPT | Mod: CPTII,S$GLB,, | Performed by: PHYSICIAN ASSISTANT

## 2022-02-10 PROCEDURE — 3008F PR BODY MASS INDEX (BMI) DOCUMENTED: ICD-10-PCS | Mod: CPTII,S$GLB,, | Performed by: PHYSICIAN ASSISTANT

## 2022-02-10 PROCEDURE — 99213 OFFICE O/P EST LOW 20 MIN: CPT | Mod: S$GLB,,, | Performed by: PHYSICIAN ASSISTANT

## 2022-02-10 RX ORDER — LEVOTHYROXINE SODIUM 50 UG/1
50 TABLET ORAL
Qty: 30 TABLET | Refills: 2 | Status: SHIPPED | OUTPATIENT
Start: 2022-02-10 | End: 2022-05-12

## 2022-02-10 NOTE — PROGRESS NOTES
Subjective:      Patient ID: Erica Law is a 41 y.o. female.    Chief Complaint: Follow-up      Patient is here today for follow-up on her thyroid labs.  Recently was seen by heme Onc and had a diminished TSH level.  She has been on Synthroid 50 mcg for 7 years with good control.  She does report that she has been out of her Synthroid for approximately 1 week, normally takes it daily.  States she is feeling well otherwise and has no complaints.      Review of Systems   Constitutional: Negative for activity change, appetite change, chills, fatigue and fever.   HENT: Negative for nasal congestion, facial swelling and rhinorrhea.    Eyes: Negative for pain and visual disturbance.   Respiratory: Negative for cough, chest tightness and shortness of breath.    Cardiovascular: Negative for chest pain, palpitations, leg swelling and claudication.   Gastrointestinal: Negative for abdominal pain, blood in stool, constipation, diarrhea, nausea, vomiting and reflux.   Endocrine: Negative for polydipsia and polyuria.   Genitourinary: Negative for difficulty urinating, flank pain and frequency.   Musculoskeletal: Negative for arthralgias and back pain.   Integumentary:         Acne    Neurological: Negative for dizziness, weakness, light-headedness, numbness and headaches.   Psychiatric/Behavioral: Negative for self-injury, sleep disturbance and suicidal ideas. The patient is not nervous/anxious.        Medication List with Changes/Refills   Current Medications    ATORVASTATIN (LIPITOR) 40 MG TABLET    TAKE 1 TABLET BY MOUTH EVERY DAY    CLINDAMYCIN PHOSPHATE 1% (CLINDAGEL) 1 % GEL        CLONAZEPAM (KLONOPIN) 1 MG TABLET    Take 1 tablet (1 mg total) by mouth 2 (two) times daily as needed for Anxiety.    DOXYCYCLINE (MONODOX) 100 MG CAPSULE        DOXYCYCLINE (VIBRAMYCIN) 100 MG CAP        ESCITALOPRAM OXALATE (LEXAPRO) 10 MG TABLET    Take 1 tablet (10 mg total) by mouth once daily.    GABAPENTIN (NEURONTIN) 300 MG CAPSULE     TAKE 1 CAPSULE BY MOUTH THREE TIMES A DAY    HUMIRA,CF, PEN 40 MG/0.4 ML PNKT        IBUPROFEN (ADVIL,MOTRIN) 800 MG TABLET    TAKE 1 TABLET BY MOUTH EVERY 6 TO 8 HOURS AS NEEDED FOR PAIN    IRON 100 PLUS TAB    TAKE 1 TABLET BY MOUTH TWICE A DAY    LEVONORGESTREL-ETHINYL ESTRADIOL (SEASONALE) 0.15 MG-30 MCG (91) PER TABLET    Take 1 tablet by mouth once daily.    LISINOPRIL (PRINIVIL,ZESTRIL) 5 MG TABLET        METFORMIN (GLUCOPHAGE) 500 MG TABLET    TAKE 1 TABLET BY MOUTH TWICE A DAY WITH MEALS   Changed and/or Refilled Medications    Modified Medication Previous Medication    LEVOTHYROXINE (SYNTHROID) 50 MCG TABLET levothyroxine (SYNTHROID) 50 MCG tablet       Take 1 tablet (50 mcg total) by mouth before breakfast.    TAKE 1 TABLET BY MOUTH EVERY DAY BEFORE BREAKFAST        Objective:     Vitals:    02/10/22 1121   BP: 133/79   Pulse: 104   Resp: 16   SpO2: 100%   Weight: 116.6 kg (257 lb)   Height: 5' (1.524 m)        Physical Exam  Constitutional:       Appearance: Normal appearance. She is obese.   HENT:      Head: Normocephalic and atraumatic.      Right Ear: External ear normal.      Left Ear: External ear normal.      Nose: Nose normal.   Eyes:      Extraocular Movements: Extraocular movements intact.      Conjunctiva/sclera: Conjunctivae normal.   Cardiovascular:      Rate and Rhythm: Normal rate and regular rhythm.      Pulses: Normal pulses.      Heart sounds: Normal heart sounds.   Pulmonary:      Effort: Pulmonary effort is normal.      Breath sounds: Normal breath sounds.   Musculoskeletal:      Cervical back: Normal range of motion and neck supple. No tenderness.   Lymphadenopathy:      Cervical: No cervical adenopathy.   Skin:     General: Skin is warm and dry.      Capillary Refill: Capillary refill takes less than 2 seconds.   Neurological:      Mental Status: She is alert and oriented to person, place, and time.   Psychiatric:         Mood and Affect: Mood normal.         Thought Content:  Thought content normal.            Assessment & Plan:     Hypothyroidism, unspecified type  -     levothyroxine (SYNTHROID) 50 MCG tablet; Take 1 tablet (50 mcg total) by mouth before breakfast.  Dispense: 30 tablet; Refill: 2  -     TSH; Future; Expected date: 02/10/2022  -     T4, Free; Future; Expected date: 02/10/2022  -     T3; Future; Expected date: 02/10/2022    Patient to resume her regular dose of Synthroid.  We will recheck when she has been back on her medicine for 6 weeks.  Orders placed today.        Follow up in about 2 months (around 4/10/2022).

## 2022-02-22 DIAGNOSIS — F41.1 GENERALIZED ANXIETY DISORDER: ICD-10-CM

## 2022-02-22 RX ORDER — CLONAZEPAM 1 MG/1
1 TABLET ORAL 2 TIMES DAILY PRN
Qty: 60 TABLET | Refills: 1 | OUTPATIENT
Start: 2022-02-22 | End: 2022-03-24

## 2022-02-24 ENCOUNTER — PATIENT MESSAGE (OUTPATIENT)
Dept: FAMILY MEDICINE | Facility: CLINIC | Age: 42
End: 2022-02-24
Payer: MEDICARE

## 2022-03-23 ENCOUNTER — PATIENT MESSAGE (OUTPATIENT)
Dept: FAMILY MEDICINE | Facility: CLINIC | Age: 42
End: 2022-03-23
Payer: MEDICARE

## 2022-03-23 DIAGNOSIS — F41.1 GENERALIZED ANXIETY DISORDER: ICD-10-CM

## 2022-03-31 ENCOUNTER — PATIENT MESSAGE (OUTPATIENT)
Dept: FAMILY MEDICINE | Facility: CLINIC | Age: 42
End: 2022-03-31
Payer: MEDICARE

## 2022-03-31 DIAGNOSIS — F41.1 GENERALIZED ANXIETY DISORDER: ICD-10-CM

## 2022-03-31 RX ORDER — CLONAZEPAM 1 MG/1
1 TABLET ORAL 2 TIMES DAILY PRN
Qty: 60 TABLET | Refills: 1 | Status: SHIPPED | OUTPATIENT
Start: 2022-03-31 | End: 2022-06-01 | Stop reason: SDUPTHER

## 2022-03-31 RX ORDER — CLONAZEPAM 1 MG/1
1 TABLET ORAL 2 TIMES DAILY PRN
Qty: 60 TABLET | Refills: 1 | Status: CANCELLED | OUTPATIENT
Start: 2022-03-31 | End: 2022-04-30

## 2022-04-11 ENCOUNTER — TELEPHONE (OUTPATIENT)
Dept: FAMILY MEDICINE | Facility: CLINIC | Age: 42
End: 2022-04-11

## 2022-04-11 ENCOUNTER — OFFICE VISIT (OUTPATIENT)
Dept: FAMILY MEDICINE | Facility: CLINIC | Age: 42
End: 2022-04-11
Payer: MEDICARE

## 2022-04-11 VITALS
OXYGEN SATURATION: 99 % | DIASTOLIC BLOOD PRESSURE: 77 MMHG | SYSTOLIC BLOOD PRESSURE: 147 MMHG | WEIGHT: 248.31 LBS | HEART RATE: 127 BPM | BODY MASS INDEX: 46.88 KG/M2 | HEIGHT: 61 IN

## 2022-04-11 DIAGNOSIS — R00.0 TACHYCARDIA: ICD-10-CM

## 2022-04-11 DIAGNOSIS — K52.9 COLITIS: ICD-10-CM

## 2022-04-11 DIAGNOSIS — R50.9 FEVER, UNSPECIFIED FEVER CAUSE: ICD-10-CM

## 2022-04-11 DIAGNOSIS — E86.0 DEHYDRATION: ICD-10-CM

## 2022-04-11 DIAGNOSIS — Z09 HOSPITAL DISCHARGE FOLLOW-UP: Primary | ICD-10-CM

## 2022-04-11 LAB
ABS NRBC COUNT: 0 X 10 3/UL (ref 0–0.01)
ABSOLUTE BASOPHIL: 0.04 X 10 3/UL (ref 0–0.22)
ABSOLUTE EOSINOPHIL: 0.15 X 10 3/UL (ref 0.04–0.54)
ABSOLUTE IMMATURE GRAN: 0.06 X 10 3/UL (ref 0–0.04)
ABSOLUTE LYMPHOCYTE: 3.42 X 10 3/UL (ref 0.86–4.75)
ABSOLUTE MONOCYTE: 0.45 X 10 3/UL (ref 0.22–1.08)
ALBUMIN SERPL-MCNC: 3.7 G/DL (ref 3.5–5.2)
ALBUMIN/GLOB SERPL ELPH: 0.9 {RATIO} (ref 1–2.7)
ALP ISOS SERPL LEV INH-CCNC: 68 U/L (ref 35–105)
ALT (SGPT): 10 U/L (ref 0–33)
ANION GAP SERPL CALC-SCNC: 10 MMOL/L (ref 8–17)
AST SERPL-CCNC: 14 U/L (ref 0–32)
BASOPHILS NFR BLD: 0.4 % (ref 0.2–1.2)
BILIRUBIN, TOTAL: 0.26 MG/DL (ref 0–1.2)
BUN/CREAT SERPL: 9.4 (ref 6–20)
CALCIUM SERPL-MCNC: 9 MG/DL (ref 8.6–10.2)
CARBON DIOXIDE, CO2: 19 MMOL/L (ref 22–29)
CHLORIDE: 106 MMOL/L (ref 98–107)
CREAT SERPL-MCNC: 1.17 MG/DL (ref 0.5–0.9)
EOSINOPHIL NFR BLD: 1.4 % (ref 0.7–7)
GFR ESTIMATION: 51.23
GLOBULIN: 4.1 G/DL (ref 1.5–4.5)
GLUCOSE: 98 MG/DL (ref 74–106)
HCT VFR BLD AUTO: 33.8 % (ref 37–47)
HGB BLD-MCNC: 10.4 G/DL (ref 12–16)
IMMATURE GRANULOCYTES: 0.6 % (ref 0–0.5)
LYMPHOCYTES NFR BLD: 31.6 % (ref 19.3–53.1)
MCH RBC QN AUTO: 28.7 PG (ref 27–32)
MCHC RBC AUTO-ENTMCNC: 30.8 G/DL (ref 32–36)
MCV RBC AUTO: 93.1 FL (ref 82–100)
MONOCYTES NFR BLD: 4.2 % (ref 4.7–12.5)
NEUTROPHILS # BLD AUTO: 6.71 X 10 3/UL (ref 2.15–7.56)
NEUTROPHILS NFR BLD: 61.8 % (ref 34–71.1)
NUCLEATED RED BLOOD CELLS: 0 /100 WBC (ref 0–0.2)
PLATELET # BLD AUTO: 371 X 10 3/UL (ref 135–400)
POTASSIUM: 4.5 MMOL/L (ref 3.5–5.1)
PROT SNV-MCNC: 7.8 G/DL (ref 6.4–8.3)
RBC # BLD AUTO: 3.63 X 10 6/UL (ref 4.2–5.4)
RDW-SD: 44.6 FL (ref 37–54)
SODIUM: 135 MMOL/L (ref 136–145)
UREA NITROGEN (BUN): 11 MG/DL (ref 6–20)
WBC # BLD: 10.83 X 10 3/UL (ref 4.3–10.8)

## 2022-04-11 PROCEDURE — 4010F ACE/ARB THERAPY RXD/TAKEN: CPT | Mod: CPTII,S$GLB,, | Performed by: PHYSICIAN ASSISTANT

## 2022-04-11 PROCEDURE — 99495 TCM SERVICES (MODERATE COMPLEXITY): ICD-10-PCS | Mod: S$GLB,,, | Performed by: PHYSICIAN ASSISTANT

## 2022-04-11 PROCEDURE — 1159F MED LIST DOCD IN RCRD: CPT | Mod: CPTII,S$GLB,, | Performed by: PHYSICIAN ASSISTANT

## 2022-04-11 PROCEDURE — 3078F PR MOST RECENT DIASTOLIC BLOOD PRESSURE < 80 MM HG: ICD-10-PCS | Mod: CPTII,S$GLB,, | Performed by: PHYSICIAN ASSISTANT

## 2022-04-11 PROCEDURE — 3077F PR MOST RECENT SYSTOLIC BLOOD PRESSURE >= 140 MM HG: ICD-10-PCS | Mod: CPTII,S$GLB,, | Performed by: PHYSICIAN ASSISTANT

## 2022-04-11 PROCEDURE — 3008F BODY MASS INDEX DOCD: CPT | Mod: CPTII,S$GLB,, | Performed by: PHYSICIAN ASSISTANT

## 2022-04-11 PROCEDURE — 99495 TRANSJ CARE MGMT MOD F2F 14D: CPT | Mod: S$GLB,,, | Performed by: PHYSICIAN ASSISTANT

## 2022-04-11 PROCEDURE — 4010F PR ACE/ARB THEARPY RXD/TAKEN: ICD-10-PCS | Mod: CPTII,S$GLB,, | Performed by: PHYSICIAN ASSISTANT

## 2022-04-11 PROCEDURE — 1159F PR MEDICATION LIST DOCUMENTED IN MEDICAL RECORD: ICD-10-PCS | Mod: CPTII,S$GLB,, | Performed by: PHYSICIAN ASSISTANT

## 2022-04-11 PROCEDURE — 3078F DIAST BP <80 MM HG: CPT | Mod: CPTII,S$GLB,, | Performed by: PHYSICIAN ASSISTANT

## 2022-04-11 PROCEDURE — 3008F PR BODY MASS INDEX (BMI) DOCUMENTED: ICD-10-PCS | Mod: CPTII,S$GLB,, | Performed by: PHYSICIAN ASSISTANT

## 2022-04-11 PROCEDURE — 3077F SYST BP >= 140 MM HG: CPT | Mod: CPTII,S$GLB,, | Performed by: PHYSICIAN ASSISTANT

## 2022-04-11 RX ORDER — SODIUM CHLORIDE 0.9 % (FLUSH) 0.9 %
10 SYRINGE (ML) INJECTION
Status: CANCELLED | OUTPATIENT
Start: 2022-04-11

## 2022-04-11 RX ORDER — PANTOPRAZOLE SODIUM 40 MG/1
40 TABLET, DELAYED RELEASE ORAL
COMMUNITY
Start: 2022-04-08 | End: 2023-06-20

## 2022-04-11 RX ORDER — NIFEDIPINE 60 MG/1
60 TABLET, EXTENDED RELEASE ORAL DAILY
COMMUNITY
Start: 2022-04-08 | End: 2022-06-17

## 2022-04-11 NOTE — TELEPHONE ENCOUNTER
Outpatient therapy plan faxed to [facility] [place] @ [phone]. Patient notified.

## 2022-04-11 NOTE — PROGRESS NOTES
Subjective:      Patient ID: Erica Law is a 41 y.o. female.    Chief Complaint: Follow-up (Patient is here for a hospital follow up visit and pulse rate is high )      Patient is here today for hospital follow-up.    Patient presented to [facility] on April 5, 2022 for intractable nausea and vomiting with intermittent abdominal pain.  At intake patient was hypertensive with blood pressure of 200/145, tachycardic, with an elevated white blood cell count of 19,000. She was admitted to evaluate her sepsis (likely from enteritis) and treat hypertensive urgency.      CT of the abdomen pelvis did show enteritis, likely viral cause.  She was treated for sepsis, although cause of infection was unclear.  Serial blood cultures were negative.  At discharge  her leukocytosis had trended down to 12.9, I plan on rechecking this today.  She was afebrile at discharge.  Discharge date April 8, 2022.    Patient reports she has been feeling well since discharge.  She denies any prior history of enteritis.  She does have a history of recurrent staph infections due to hidradenitis, she has taken Humira are on and off for quite some time.  She denies any current infections.  She does report a history of fever last night, patient reports 101 F. no fever since.        Transitional Care Note    Family and/or Caretaker present at visit?  No.  Diagnostic tests reviewed/disposition: I have reviewed all completed as well as pending diagnostic tests at the time of discharge.  Disease/illness education:  Yes.  Patient counseled on signs of recurrence of sepsis.  I will be following her over the next week.  Home health/community services discussion/referrals: Patient does not have home health established from hospital visit.  They do not need home health.  If needed, we will set up home health for the patient.   Establishment or re-establishment of referral orders for community resources: No other necessary community resources.    Discussion with other health care providers: No discussion with other health care providers necessary.               Review of Systems   Constitutional: Positive for fatigue and fever (pt reports fever last pm of 101F ). Negative for activity change, appetite change, chills and diaphoresis.   HENT: Negative for nasal congestion, facial swelling and rhinorrhea.    Eyes: Negative for pain and visual disturbance.   Respiratory: Negative for cough, chest tightness and shortness of breath.    Cardiovascular: Negative for chest pain, palpitations, leg swelling and claudication.   Gastrointestinal: Positive for abdominal pain (improving), nausea (resolved) and vomiting (resolved ). Negative for abdominal distention, anal bleeding, blood in stool, change in bowel habit, constipation, diarrhea, reflux and change in bowel habit.   Endocrine: Negative for polydipsia and polyuria.   Genitourinary: Negative for difficulty urinating, flank pain and frequency.   Musculoskeletal: Negative for arthralgias and back pain.   Integumentary:         Abscess    Neurological: Negative for dizziness, weakness, light-headedness, numbness and headaches.   Psychiatric/Behavioral: Negative for self-injury, sleep disturbance and suicidal ideas. The patient is not nervous/anxious.        Medication List with Changes/Refills   Current Medications    ATORVASTATIN (LIPITOR) 40 MG TABLET    TAKE 1 TABLET BY MOUTH EVERY DAY    CLINDAMYCIN PHOSPHATE 1% (CLINDAGEL) 1 % GEL        CLONAZEPAM (KLONOPIN) 1 MG TABLET    Take 1 tablet (1 mg total) by mouth 2 (two) times daily as needed for Anxiety.    ESCITALOPRAM OXALATE (LEXAPRO) 10 MG TABLET    Take 1 tablet (10 mg total) by mouth once daily.    GABAPENTIN (NEURONTIN) 300 MG CAPSULE    TAKE 1 CAPSULE BY MOUTH THREE TIMES A DAY    DERIAN CLIFFORD, PEN 40 MG/0.4 ML PNKT        IRON 100 PLUS TAB    TAKE 1 TABLET BY MOUTH TWICE A DAY    LEVONORGESTREL-ETHINYL ESTRADIOL (SEASONALE) 0.15 MG-30 MCG (91) PER TABLET    " Take 1 tablet by mouth once daily.    LEVOTHYROXINE (SYNTHROID) 50 MCG TABLET    Take 1 tablet (50 mcg total) by mouth before breakfast.    METFORMIN (GLUCOPHAGE) 500 MG TABLET    TAKE 1 TABLET BY MOUTH TWICE A DAY WITH MEALS    NIFEDIPINE (ADALAT CC) 60 MG TBSR    Take 60 mg by mouth once daily.    PANTOPRAZOLE (PROTONIX) 40 MG TABLET    40 mg.   Discontinued Medications    DOXYCYCLINE (VIBRAMYCIN) 100 MG CAP        IBUPROFEN (ADVIL,MOTRIN) 800 MG TABLET    TAKE 1 TABLET BY MOUTH EVERY 6 TO 8 HOURS AS NEEDED FOR PAIN    LISINOPRIL (PRINIVIL,ZESTRIL) 5 MG TABLET            Objective:     Vitals:    04/11/22 1107   BP: (!) 147/77   Pulse: (!) 127   SpO2: 99%   Weight: 112.6 kg (248 lb 4.8 oz)   Height: 5' 1" (1.549 m)        Physical Exam  Constitutional:       Appearance: Normal appearance. She is obese.   HENT:      Head: Normocephalic and atraumatic.      Right Ear: External ear normal.      Left Ear: External ear normal.      Nose: No congestion or rhinorrhea.      Mouth/Throat:      Mouth: Mucous membranes are moist.      Pharynx: No oropharyngeal exudate or posterior oropharyngeal erythema.   Eyes:      General: No scleral icterus.        Right eye: No discharge.         Left eye: No discharge.      Conjunctiva/sclera: Conjunctivae normal.   Cardiovascular:      Rate and Rhythm: Normal rate and regular rhythm.      Pulses: Normal pulses.      Heart sounds: Normal heart sounds. No murmur heard.    No friction rub. No gallop. No S3 or S4 sounds.   Pulmonary:      Effort: Pulmonary effort is normal. No respiratory distress.      Breath sounds: Normal breath sounds. No wheezing, rhonchi or rales.   Abdominal:      General: Abdomen is flat. Bowel sounds are normal. There is no distension.      Palpations: Abdomen is soft. There is no mass.      Tenderness: There is no abdominal tenderness. There is no right CVA tenderness, left CVA tenderness, guarding or rebound.      Hernia: No hernia is present. "   Musculoskeletal:      Cervical back: Normal range of motion. No rigidity or tenderness.      Right lower leg: No edema.      Left lower leg: No edema.      Comments: Moves all extremities well and with good control    Skin:     General: Skin is warm and dry.      Capillary Refill: Capillary refill takes less than 2 seconds.      Findings: No rash.   Neurological:      Mental Status: She is alert and oriented to person, place, and time.      Motor: No weakness.      Gait: Gait normal.   Psychiatric:         Mood and Affect: Mood normal.         Behavior: Behavior normal.         Judgment: Judgment normal.            Assessment & Plan:     Hospital discharge follow-up  -     Comprehensive Metabolic Panel; Future; Expected date: 04/11/2022  -     CBC Auto Differential; Future; Expected date: 04/11/2022  -     sodium chloride 0.9% bolus 1,000 mL  -     Insert peripheral IV; Standing  -     Discontinue IV; Standing    Colitis  -     Comprehensive Metabolic Panel; Future; Expected date: 04/11/2022  -     CBC Auto Differential; Future; Expected date: 04/11/2022  -     sodium chloride 0.9% bolus 1,000 mL  -     Insert peripheral IV; Standing  -     Discontinue IV; Standing    Fever, unspecified fever cause  -     Comprehensive Metabolic Panel; Future; Expected date: 04/11/2022  -     CBC Auto Differential; Future; Expected date: 04/11/2022  -     sodium chloride 0.9% bolus 1,000 mL  -     Insert peripheral IV; Standing  -     Discontinue IV; Standing    Dehydration  -     sodium chloride 0.9% bolus 1,000 mL  -     Insert peripheral IV; Standing  -     Discontinue IV; Standing    Tachycardia  -     Comprehensive Metabolic Panel; Future; Expected date: 04/11/2022  -     CBC Auto Differential; Future; Expected date: 04/11/2022  -     sodium chloride 0.9% bolus 1,000 mL    Other orders  -     sodium chloride 0.9% flush 10 mL  -     sodium chloride 0.9% bolus 1,000 mL  -     sodium chloride 0.9% flush 10 mL     Patient overall  is well-appearing today and her symptoms that led to her admission have resolved.  I am concerned if her elevated heart rate, 127 at intake, 130 on my exam.  I have drawn some labs for her her to have done today and a1L of normal saline to have done at an outside urgent care.  I plan to see her back in clinic tomorrow for recheck.      No follow-ups on file.       -Patient instructed that our office calls back on all labs and imaging within 1 week of receiving the results. Patient instructed to reach out to our office if they have not heard from us so that we can request the results from the lab/imaging center           Pushpa Kinsey PA-C

## 2022-04-12 ENCOUNTER — OFFICE VISIT (OUTPATIENT)
Dept: FAMILY MEDICINE | Facility: CLINIC | Age: 42
End: 2022-04-12
Payer: MEDICARE

## 2022-04-12 VITALS
WEIGHT: 248 LBS | SYSTOLIC BLOOD PRESSURE: 133 MMHG | BODY MASS INDEX: 46.82 KG/M2 | DIASTOLIC BLOOD PRESSURE: 71 MMHG | HEART RATE: 103 BPM | HEIGHT: 61 IN | OXYGEN SATURATION: 97 %

## 2022-04-12 DIAGNOSIS — N76.0 ACUTE VAGINITIS: ICD-10-CM

## 2022-04-12 DIAGNOSIS — D72.829 LEUKOCYTOSIS, UNSPECIFIED TYPE: Primary | ICD-10-CM

## 2022-04-12 DIAGNOSIS — N28.9 RENAL INSUFFICIENCY: ICD-10-CM

## 2022-04-12 PROCEDURE — 3075F PR MOST RECENT SYSTOLIC BLOOD PRESS GE 130-139MM HG: ICD-10-PCS | Mod: CPTII,S$GLB,, | Performed by: PHYSICIAN ASSISTANT

## 2022-04-12 PROCEDURE — 4010F ACE/ARB THERAPY RXD/TAKEN: CPT | Mod: CPTII,S$GLB,, | Performed by: PHYSICIAN ASSISTANT

## 2022-04-12 PROCEDURE — 99213 OFFICE O/P EST LOW 20 MIN: CPT | Mod: S$GLB,,, | Performed by: PHYSICIAN ASSISTANT

## 2022-04-12 PROCEDURE — 3078F DIAST BP <80 MM HG: CPT | Mod: CPTII,S$GLB,, | Performed by: PHYSICIAN ASSISTANT

## 2022-04-12 PROCEDURE — 3075F SYST BP GE 130 - 139MM HG: CPT | Mod: CPTII,S$GLB,, | Performed by: PHYSICIAN ASSISTANT

## 2022-04-12 PROCEDURE — 1159F PR MEDICATION LIST DOCUMENTED IN MEDICAL RECORD: ICD-10-PCS | Mod: CPTII,S$GLB,, | Performed by: PHYSICIAN ASSISTANT

## 2022-04-12 PROCEDURE — 3008F BODY MASS INDEX DOCD: CPT | Mod: CPTII,S$GLB,, | Performed by: PHYSICIAN ASSISTANT

## 2022-04-12 PROCEDURE — 3008F PR BODY MASS INDEX (BMI) DOCUMENTED: ICD-10-PCS | Mod: CPTII,S$GLB,, | Performed by: PHYSICIAN ASSISTANT

## 2022-04-12 PROCEDURE — 4010F PR ACE/ARB THEARPY RXD/TAKEN: ICD-10-PCS | Mod: CPTII,S$GLB,, | Performed by: PHYSICIAN ASSISTANT

## 2022-04-12 PROCEDURE — 3078F PR MOST RECENT DIASTOLIC BLOOD PRESSURE < 80 MM HG: ICD-10-PCS | Mod: CPTII,S$GLB,, | Performed by: PHYSICIAN ASSISTANT

## 2022-04-12 PROCEDURE — 1159F MED LIST DOCD IN RCRD: CPT | Mod: CPTII,S$GLB,, | Performed by: PHYSICIAN ASSISTANT

## 2022-04-12 PROCEDURE — 99213 PR OFFICE/OUTPT VISIT, EST, LEVL III, 20-29 MIN: ICD-10-PCS | Mod: S$GLB,,, | Performed by: PHYSICIAN ASSISTANT

## 2022-04-12 RX ORDER — FLUCONAZOLE 150 MG/1
150 TABLET ORAL DAILY
Qty: 2 TABLET | Refills: 0 | Status: SHIPPED | OUTPATIENT
Start: 2022-04-12 | End: 2022-04-13

## 2022-04-12 NOTE — PROGRESS NOTES
Subjective:      Patient ID: Erica Law is a 42 y.o. female.    Chief Complaint: Follow-up (Patient is here for a follow up visit )      HPI  Pt is here today for followup and new complaint    Saw yesterday by myself for a hospital followup.  Sent for IVF and labs yesterday.  Reports she is feeling well today.  Labs reviewed, leukocytosis and renal insufficiency present, likely remaining from recent hospital course.    New complaint of vaginal itching and thick white DC since her recent abx.        Review of Systems   Constitutional: Negative for activity change, appetite change, chills, fatigue and fever.   HENT: Negative for nasal congestion, facial swelling and rhinorrhea.    Eyes: Negative for pain and visual disturbance.   Respiratory: Negative for cough, chest tightness and shortness of breath.    Cardiovascular: Negative for chest pain, palpitations, leg swelling and claudication.   Gastrointestinal: Negative for abdominal distention, abdominal pain, anal bleeding, blood in stool, constipation, diarrhea, nausea, vomiting and reflux.   Endocrine: Negative for polydipsia and polyuria.   Genitourinary: Positive for vaginal discharge. Negative for difficulty urinating, flank pain, frequency, pelvic pain, vaginal bleeding, vaginal pain and vaginal dryness.   Musculoskeletal: Negative for arthralgias and back pain.   Neurological: Negative for dizziness, weakness, light-headedness, numbness and headaches.   Psychiatric/Behavioral: Negative for self-injury, sleep disturbance and suicidal ideas. The patient is not nervous/anxious.        Medication List with Changes/Refills   New Medications    FLUCONAZOLE (DIFLUCAN) 150 MG TAB    Take 1 tablet (150 mg total) by mouth once daily. Take one day, repeat in 3 days if not resolved for 1 day   Current Medications    ATORVASTATIN (LIPITOR) 40 MG TABLET    TAKE 1 TABLET BY MOUTH EVERY DAY    CLINDAMYCIN PHOSPHATE 1% (CLINDAGEL) 1 % GEL        CLONAZEPAM (KLONOPIN) 1 MG  "TABLET    Take 1 tablet (1 mg total) by mouth 2 (two) times daily as needed for Anxiety.    ESCITALOPRAM OXALATE (LEXAPRO) 10 MG TABLET    Take 1 tablet (10 mg total) by mouth once daily.    GABAPENTIN (NEURONTIN) 300 MG CAPSULE    TAKE 1 CAPSULE BY MOUTH THREE TIMES A DAY    HUMIRA,CF, PEN 40 MG/0.4 ML PNKT        IRON 100 PLUS TAB    TAKE 1 TABLET BY MOUTH TWICE A DAY    LEVONORGESTREL-ETHINYL ESTRADIOL (SEASONALE) 0.15 MG-30 MCG (91) PER TABLET    Take 1 tablet by mouth once daily.    LEVOTHYROXINE (SYNTHROID) 50 MCG TABLET    Take 1 tablet (50 mcg total) by mouth before breakfast.    METFORMIN (GLUCOPHAGE) 500 MG TABLET    TAKE 1 TABLET BY MOUTH TWICE A DAY WITH MEALS    NIFEDIPINE (ADALAT CC) 60 MG TBSR    Take 60 mg by mouth once daily.    PANTOPRAZOLE (PROTONIX) 40 MG TABLET    40 mg.        Objective:     Vitals:    04/12/22 1144   BP: 133/71   Pulse: 103   SpO2: 97%   Weight: 112.5 kg (248 lb)   Height: 5' 1" (1.549 m)        Physical Exam  Constitutional:       Appearance: Normal appearance.   HENT:      Head: Normocephalic and atraumatic.      Right Ear: External ear normal.      Left Ear: External ear normal.      Nose: Nose normal.   Eyes:      Extraocular Movements: Extraocular movements intact.      Conjunctiva/sclera: Conjunctivae normal.      Pupils: Pupils are equal, round, and reactive to light.   Cardiovascular:      Rate and Rhythm: Normal rate and regular rhythm.      Pulses: Normal pulses.      Heart sounds: Normal heart sounds.      Comments: 90 bpm on exam   Pulmonary:      Effort: Pulmonary effort is normal.      Breath sounds: Normal breath sounds.   Skin:     General: Skin is warm.   Neurological:      Mental Status: She is alert.            Assessment & Plan:     Leukocytosis, unspecified type  -     CBC Auto Differential; Future; Expected date: 04/12/2022    Renal insufficiency  -     Comprehensive Metabolic Panel; Future; Expected date: 04/12/2022    Acute vaginitis  -     fluconazole " (DIFLUCAN) 150 MG Tab; Take 1 tablet (150 mg total) by mouth once daily. Take one day, repeat in 3 days if not resolved for 1 day  Dispense: 2 tablet; Refill: 0       Recheck labs and followup in 2 weeks     Follow up in about 2 weeks (around 4/26/2022).       -Patient instructed that our office calls back on all labs and imaging within 1 week of receiving the results. Patient instructed to reach out to our office if they have not heard from us so that we can request the results from the lab/imaging center           Pushpa Kinsey PA-C

## 2022-05-08 ENCOUNTER — PATIENT MESSAGE (OUTPATIENT)
Dept: FAMILY MEDICINE | Facility: CLINIC | Age: 42
End: 2022-05-08
Payer: MEDICARE

## 2022-05-09 RX ORDER — FLUCONAZOLE 150 MG/1
150 TABLET ORAL DAILY
Qty: 2 TABLET | Refills: 0 | Status: SHIPPED | OUTPATIENT
Start: 2022-05-09 | End: 2022-05-10

## 2022-05-11 ENCOUNTER — OFFICE VISIT (OUTPATIENT)
Dept: FAMILY MEDICINE | Facility: CLINIC | Age: 42
End: 2022-05-11
Payer: MEDICARE

## 2022-05-11 VITALS
HEIGHT: 61 IN | BODY MASS INDEX: 45.57 KG/M2 | SYSTOLIC BLOOD PRESSURE: 129 MMHG | WEIGHT: 241.38 LBS | DIASTOLIC BLOOD PRESSURE: 67 MMHG | OXYGEN SATURATION: 98 % | HEART RATE: 102 BPM

## 2022-05-11 DIAGNOSIS — I10 PRIMARY HYPERTENSION: ICD-10-CM

## 2022-05-11 DIAGNOSIS — B37.31 CANDIDA VAGINITIS: ICD-10-CM

## 2022-05-11 DIAGNOSIS — G89.4 CHRONIC PAIN SYNDROME: ICD-10-CM

## 2022-05-11 DIAGNOSIS — L73.2 HIDRADENITIS SUPPURATIVA: Primary | ICD-10-CM

## 2022-05-11 DIAGNOSIS — E11.69 TYPE 2 DIABETES MELLITUS WITH OTHER SPECIFIED COMPLICATION, WITHOUT LONG-TERM CURRENT USE OF INSULIN: ICD-10-CM

## 2022-05-11 PROBLEM — E11.9 TYPE 2 DIABETES MELLITUS, WITHOUT LONG-TERM CURRENT USE OF INSULIN: Status: ACTIVE | Noted: 2022-05-11

## 2022-05-11 PROBLEM — R73.03 PREDIABETES: Status: RESOLVED | Noted: 2020-03-27 | Resolved: 2022-05-11

## 2022-05-11 PROCEDURE — 3008F PR BODY MASS INDEX (BMI) DOCUMENTED: ICD-10-PCS | Mod: CPTII,S$GLB,, | Performed by: FAMILY MEDICINE

## 2022-05-11 PROCEDURE — 3074F SYST BP LT 130 MM HG: CPT | Mod: CPTII,S$GLB,, | Performed by: FAMILY MEDICINE

## 2022-05-11 PROCEDURE — 99214 OFFICE O/P EST MOD 30 MIN: CPT | Mod: S$GLB,,, | Performed by: FAMILY MEDICINE

## 2022-05-11 PROCEDURE — 1159F MED LIST DOCD IN RCRD: CPT | Mod: CPTII,S$GLB,, | Performed by: FAMILY MEDICINE

## 2022-05-11 PROCEDURE — 3074F PR MOST RECENT SYSTOLIC BLOOD PRESSURE < 130 MM HG: ICD-10-PCS | Mod: CPTII,S$GLB,, | Performed by: FAMILY MEDICINE

## 2022-05-11 PROCEDURE — 99214 PR OFFICE/OUTPT VISIT, EST, LEVL IV, 30-39 MIN: ICD-10-PCS | Mod: S$GLB,,, | Performed by: FAMILY MEDICINE

## 2022-05-11 PROCEDURE — 1160F PR REVIEW ALL MEDS BY PRESCRIBER/CLIN PHARMACIST DOCUMENTED: ICD-10-PCS | Mod: CPTII,S$GLB,, | Performed by: FAMILY MEDICINE

## 2022-05-11 PROCEDURE — 1159F PR MEDICATION LIST DOCUMENTED IN MEDICAL RECORD: ICD-10-PCS | Mod: CPTII,S$GLB,, | Performed by: FAMILY MEDICINE

## 2022-05-11 PROCEDURE — 3078F DIAST BP <80 MM HG: CPT | Mod: CPTII,S$GLB,, | Performed by: FAMILY MEDICINE

## 2022-05-11 PROCEDURE — 4010F ACE/ARB THERAPY RXD/TAKEN: CPT | Mod: CPTII,S$GLB,, | Performed by: FAMILY MEDICINE

## 2022-05-11 PROCEDURE — 4010F PR ACE/ARB THEARPY RXD/TAKEN: ICD-10-PCS | Mod: CPTII,S$GLB,, | Performed by: FAMILY MEDICINE

## 2022-05-11 PROCEDURE — 3078F PR MOST RECENT DIASTOLIC BLOOD PRESSURE < 80 MM HG: ICD-10-PCS | Mod: CPTII,S$GLB,, | Performed by: FAMILY MEDICINE

## 2022-05-11 PROCEDURE — 3008F BODY MASS INDEX DOCD: CPT | Mod: CPTII,S$GLB,, | Performed by: FAMILY MEDICINE

## 2022-05-11 PROCEDURE — 1160F RVW MEDS BY RX/DR IN RCRD: CPT | Mod: CPTII,S$GLB,, | Performed by: FAMILY MEDICINE

## 2022-05-11 RX ORDER — FLUCONAZOLE 150 MG/1
150 TABLET ORAL WEEKLY
Qty: 3 TABLET | Refills: 0 | Status: SHIPPED | OUTPATIENT
Start: 2022-05-11 | End: 2022-08-31 | Stop reason: SDUPTHER

## 2022-05-11 RX ORDER — TRAMADOL HYDROCHLORIDE 50 MG/1
50 TABLET ORAL EVERY 6 HOURS PRN
Qty: 28 TABLET | Refills: 0 | Status: SHIPPED | OUTPATIENT
Start: 2022-05-11 | End: 2022-05-18

## 2022-05-11 RX ORDER — CLINDAMYCIN HYDROCHLORIDE 300 MG/1
300 CAPSULE ORAL EVERY 6 HOURS
Qty: 28 CAPSULE | Refills: 0 | Status: SHIPPED | OUTPATIENT
Start: 2022-05-11 | End: 2022-05-18

## 2022-05-11 NOTE — PROGRESS NOTES
Subjective:       Patient ID: Erica Law is a 42 y.o. female.    Chief Complaint: Follow-up (Patient is here for a follow up visit )    HPI     F/u chronic and new prob as below  HS - pt has had multiple abscesses of groin area, had resolved but now recurring. She had received abx and fluids at  in April due to sepsis related to abscesses  Also c/o think and blood tinted vaginal discharge   HTN - well controlled on current  dm - doing well on metformin, last a1c 5.9. will recheck next visit  She is establishing with Pain management in [place]    Review of Systems   Constitutional: Negative for chills, diaphoresis, fatigue, fever and unexpected weight change.   HENT: Negative for nasal congestion, hearing loss, rhinorrhea, sinus pressure/congestion, sore throat, trouble swallowing and voice change.    Eyes: Negative for pain and visual disturbance.   Respiratory: Negative for cough, chest tightness, shortness of breath and wheezing.    Cardiovascular: Negative for chest pain, palpitations and leg swelling.   Gastrointestinal: Negative for abdominal pain, constipation, diarrhea, nausea and vomiting.   Endocrine: Negative for polydipsia and polyuria.   Genitourinary: Positive for vaginal discharge. Negative for decreased urine volume, dysuria, flank pain, frequency, hematuria and pelvic pain.   Musculoskeletal: Negative for arthralgias, back pain, myalgias and neck pain.   Integumentary:  Positive for wound. Negative for color change, pallor and rash.   Neurological: Negative for dizziness, syncope, weakness, light-headedness and headaches.   Hematological: Negative for adenopathy.   Psychiatric/Behavioral: Negative for decreased concentration, dysphoric mood and sleep disturbance. The patient is not nervous/anxious.          Objective:      Physical Exam  Vitals reviewed.   Constitutional:       General: She is not in acute distress.     Appearance: She is well-developed. She is not diaphoretic.   HENT:       Head: Normocephalic and atraumatic.      Nose: Nose normal.      Mouth/Throat:      Mouth: Mucous membranes are moist.      Pharynx: Oropharynx is clear.   Eyes:      Conjunctiva/sclera: Conjunctivae normal.      Pupils: Pupils are equal, round, and reactive to light.   Neck:      Thyroid: No thyromegaly.      Vascular: No JVD.   Cardiovascular:      Rate and Rhythm: Normal rate and regular rhythm.      Pulses: Normal pulses.      Heart sounds: Normal heart sounds. No murmur heard.    No friction rub. No gallop.   Pulmonary:      Effort: Pulmonary effort is normal. No respiratory distress.      Breath sounds: Normal breath sounds. No stridor. No wheezing, rhonchi or rales.   Abdominal:      General: Bowel sounds are normal. There is no distension.      Palpations: Abdomen is soft.      Tenderness: There is no abdominal tenderness.   Musculoskeletal:         General: No tenderness.      Cervical back: Normal range of motion and neck supple.      Right lower leg: No edema.   Lymphadenopathy:      Cervical: No cervical adenopathy.   Skin:     General: Skin is warm and dry.      Coloration: Skin is not pale.      Findings: No erythema or rash.   Neurological:      General: No focal deficit present.      Mental Status: She is alert and oriented to person, place, and time.   Psychiatric:         Mood and Affect: Mood normal.         Behavior: Behavior normal.         Assessment:       Problem List Items Addressed This Visit        Derm    Hidradenitis suppurativa - Primary    Relevant Medications    clindamycin (CLEOCIN) 300 MG capsule       Cardiac/Vascular    Primary hypertension       Endocrine    Type 2 diabetes mellitus, without long-term current use of insulin      Other Visit Diagnoses     Candida vaginitis        Relevant Medications    fluconazole (DIFLUCAN) 150 MG Tab    Chronic pain syndrome        Relevant Medications    traMADoL (ULTRAM) 50 mg tablet    Other Relevant Orders    Ambulatory referral/consult to  Pain Clinic          Plan:       Hidradenitis suppurativa  -     clindamycin (CLEOCIN) 300 MG capsule; Take 1 capsule (300 mg total) by mouth every 6 (six) hours. for 7 days  Dispense: 28 capsule; Refill: 0    Candida vaginitis  -     fluconazole (DIFLUCAN) 150 MG Tab; Take 1 tablet (150 mg total) by mouth once a week. for 21 days  Dispense: 3 tablet; Refill: 0    Chronic pain syndrome  -     Ambulatory referral/consult to Pain Clinic; Future; Expected date: 05/18/2022  -     traMADoL (ULTRAM) 50 mg tablet; Take 1 tablet (50 mg total) by mouth every 6 (six) hours as needed for Pain.  Dispense: 28 tablet; Refill: 0    Type 2 diabetes mellitus with other specified complication, without long-term current use of insulin  Comments:  continue metformin, last a1c 7.0 > 5.9    Primary hypertension  Comments:  controlled on current

## 2022-05-17 ENCOUNTER — OFFICE VISIT (OUTPATIENT)
Dept: OBSTETRICS AND GYNECOLOGY | Facility: CLINIC | Age: 42
End: 2022-05-17
Payer: MEDICARE

## 2022-05-17 VITALS
WEIGHT: 244 LBS | HEART RATE: 101 BPM | BODY MASS INDEX: 46.1 KG/M2 | SYSTOLIC BLOOD PRESSURE: 128 MMHG | DIASTOLIC BLOOD PRESSURE: 89 MMHG

## 2022-05-17 DIAGNOSIS — L02.91 ABSCESS: Primary | ICD-10-CM

## 2022-05-17 PROCEDURE — 99214 OFFICE O/P EST MOD 30 MIN: CPT | Mod: S$GLB,,, | Performed by: OBSTETRICS & GYNECOLOGY

## 2022-05-17 PROCEDURE — 3079F PR MOST RECENT DIASTOLIC BLOOD PRESSURE 80-89 MM HG: ICD-10-PCS | Mod: CPTII,S$GLB,, | Performed by: OBSTETRICS & GYNECOLOGY

## 2022-05-17 PROCEDURE — 99214 PR OFFICE/OUTPT VISIT, EST, LEVL IV, 30-39 MIN: ICD-10-PCS | Mod: S$GLB,,, | Performed by: OBSTETRICS & GYNECOLOGY

## 2022-05-17 PROCEDURE — 3008F PR BODY MASS INDEX (BMI) DOCUMENTED: ICD-10-PCS | Mod: CPTII,S$GLB,, | Performed by: OBSTETRICS & GYNECOLOGY

## 2022-05-17 PROCEDURE — 3074F PR MOST RECENT SYSTOLIC BLOOD PRESSURE < 130 MM HG: ICD-10-PCS | Mod: CPTII,S$GLB,, | Performed by: OBSTETRICS & GYNECOLOGY

## 2022-05-17 PROCEDURE — 4010F PR ACE/ARB THEARPY RXD/TAKEN: ICD-10-PCS | Mod: CPTII,S$GLB,, | Performed by: OBSTETRICS & GYNECOLOGY

## 2022-05-17 PROCEDURE — 3008F BODY MASS INDEX DOCD: CPT | Mod: CPTII,S$GLB,, | Performed by: OBSTETRICS & GYNECOLOGY

## 2022-05-17 PROCEDURE — 3079F DIAST BP 80-89 MM HG: CPT | Mod: CPTII,S$GLB,, | Performed by: OBSTETRICS & GYNECOLOGY

## 2022-05-17 PROCEDURE — 3074F SYST BP LT 130 MM HG: CPT | Mod: CPTII,S$GLB,, | Performed by: OBSTETRICS & GYNECOLOGY

## 2022-05-17 PROCEDURE — 4010F ACE/ARB THERAPY RXD/TAKEN: CPT | Mod: CPTII,S$GLB,, | Performed by: OBSTETRICS & GYNECOLOGY

## 2022-05-17 NOTE — PROGRESS NOTES
Subjective:       Patient ID: Erica Law is a 42 y.o. female.    Chief Complaint:  vaginal lesion      History of Present Illness  HPI  Here with chronic infection of mons pubis area.  No other complaints.  Tried abx's      Past Medical History:   Diagnosis Date    Anxiety     Diabetes mellitus     Hydradenitis     Hypertension     Staph infection     Thyroid disease        Past Surgical History:   Procedure Laterality Date    ABCESS DRAINAGE      from the breast     BREAST CYST EXCISION       SECTION         OB:    Gyn:no STD, never had abn pap   Meds:   Current Outpatient Medications:     atorvastatin (LIPITOR) 40 MG tablet, TAKE 1 TABLET BY MOUTH EVERY DAY, Disp: 90 tablet, Rfl: 1    clindamycin (CLEOCIN) 300 MG capsule, Take 1 capsule (300 mg total) by mouth every 6 (six) hours. for 7 days, Disp: 28 capsule, Rfl: 0    clindamycin phosphate 1% (CLINDAGEL) 1 % gel, , Disp: , Rfl:     clonazePAM (KLONOPIN) 1 MG tablet, Take 1 tablet (1 mg total) by mouth 2 (two) times daily as needed for Anxiety., Disp: 60 tablet, Rfl: 1    EScitalopram oxalate (LEXAPRO) 10 MG tablet, Take 1 tablet (10 mg total) by mouth once daily., Disp: 90 tablet, Rfl: 1    fluconazole (DIFLUCAN) 150 MG Tab, Take 1 tablet (150 mg total) by mouth once a week. for 21 days, Disp: 3 tablet, Rfl: 0    gabapentin (NEURONTIN) 300 MG capsule, TAKE 1 CAPSULE BY MOUTH THREE TIMES A DAY, Disp: 90 capsule, Rfl: 3    HUMIRA,CF, PEN 40 mg/0.4 mL PnKt, , Disp: , Rfl:     IRON 100 PLUS Tab, TAKE 1 TABLET BY MOUTH TWICE A DAY, Disp: 60 tablet, Rfl: 8    levonorgestrel-ethinyl estradiol (SEASONALE) 0.15 mg-30 mcg (91) per tablet, Take 1 tablet by mouth once daily., Disp: 30 tablet, Rfl: 11    levothyroxine (SYNTHROID) 50 MCG tablet, TAKE 1 TABLET BY MOUTH BEFORE BREAKFAST., Disp: 30 tablet, Rfl: 2    metFORMIN (GLUCOPHAGE) 500 MG tablet, TAKE 1 TABLET BY MOUTH TWICE A DAY WITH MEALS, Disp: 180 tablet, Rfl: 1     NIFEdipine (ADALAT CC) 60 MG TbSR, Take 60 mg by mouth once daily., Disp: , Rfl:     pantoprazole (PROTONIX) 40 MG tablet, 40 mg., Disp: , Rfl:     traMADoL (ULTRAM) 50 mg tablet, Take 1 tablet (50 mg total) by mouth every 6 (six) hours as needed for Pain., Disp: 28 tablet, Rfl: 0    Current Facility-Administered Medications:     sodium chloride 0.9% bolus 1,000 mL, 1,000 mL, Intravenous, 1 time in Clinic/HOD, Pushpa Kinsey PA-C    All: Review of patient's allergies indicates:  No Known Allergies    SH:   Social History     Tobacco Use    Smoking status: Former Smoker     Packs/day: 0.50     Years: 1.00     Pack years: 0.50     Types: Vaping with nicotine, Cigarettes     Quit date: 2016     Years since quittin.6    Smokeless tobacco: Never Used    Tobacco comment: pt on medications to stop smoking    Substance Use Topics    Alcohol use: Not Currently      FH: family history includes Cancer in her maternal grandfather and maternal grandmother; Diabetes Mellitus in her mother.      GYN & OB History  No LMP recorded. Patient has had an implant.   Date of Last Pap: No result found    OB History    Para Term  AB Living   2 1 1   1 1   SAB IAB Ectopic Multiple Live Births   1       1      # Outcome Date GA Lbr Evin/2nd Weight Sex Delivery Anes PTL Lv   2 Term 01 39w0d  2.977 kg (6 lb 9 oz) F CS-LTranv EPI, Spinal N TEMO      Complications: Failure to Progress in Second Stage   1 SAB                Review of Systems  Review of Systems   Constitutional: Negative for chills and fever.   Respiratory: Negative for shortness of breath.    Cardiovascular: Negative for chest pain.   Gastrointestinal: Negative for abdominal pain, blood in stool, constipation, diarrhea, nausea, vomiting and reflux.   Genitourinary: Negative for dysmenorrhea, dyspareunia, dysuria, hematuria, hot flashes, menorrhagia, menstrual problem, pelvic pain, vaginal bleeding, vaginal discharge, postcoital bleeding and  vaginal dryness.   Musculoskeletal: Negative for arthralgias and joint swelling.   Integumentary:  Negative for rash and hair changes.   Psychiatric/Behavioral: Negative for depression. The patient is not nervous/anxious.            Objective:    Physical Exam:   Constitutional: She appears well-developed and well-nourished. No distress.    HENT:   Head: Normocephalic.    Eyes: Conjunctivae and EOM are normal.    Neck: No tracheal deviation present. No thyromegaly present.    Cardiovascular: Exam reveals no clubbing, no cyanosis and no edema.     Pulmonary/Chest: Effort normal. No respiratory distress.                  Musculoskeletal: Normal range of motion and moves all extremeties.        Skin: No rash noted. She is not diaphoretic. No cyanosis. Nails show no clubbing.    Psychiatric: She has a normal mood and affect. Her behavior is normal. Judgment and thought content normal.        wet and foul odor under panus.  Panus covers mons- 2-3 cm abcsess present  Assessment:        1. Abscess               Plan:      To OR for drainage

## 2022-05-26 DIAGNOSIS — D64.9 ANEMIA, UNSPECIFIED TYPE: Primary | ICD-10-CM

## 2022-06-01 DIAGNOSIS — F41.1 GENERALIZED ANXIETY DISORDER: ICD-10-CM

## 2022-06-01 RX ORDER — CLONAZEPAM 1 MG/1
1 TABLET ORAL 2 TIMES DAILY PRN
Qty: 60 TABLET | Refills: 1 | Status: SHIPPED | OUTPATIENT
Start: 2022-06-01 | End: 2022-08-02 | Stop reason: SDUPTHER

## 2022-07-21 ENCOUNTER — PATIENT MESSAGE (OUTPATIENT)
Dept: FAMILY MEDICINE | Facility: CLINIC | Age: 42
End: 2022-07-21
Payer: MEDICAID

## 2022-08-01 ENCOUNTER — TELEPHONE (OUTPATIENT)
Dept: OBSTETRICS AND GYNECOLOGY | Facility: CLINIC | Age: 42
End: 2022-08-01
Payer: MEDICAID

## 2022-08-01 ENCOUNTER — TELEPHONE (OUTPATIENT)
Dept: HEMATOLOGY/ONCOLOGY | Facility: CLINIC | Age: 42
End: 2022-08-01
Payer: MEDICAID

## 2022-08-02 ENCOUNTER — OFFICE VISIT (OUTPATIENT)
Dept: OBSTETRICS AND GYNECOLOGY | Facility: CLINIC | Age: 42
End: 2022-08-02
Payer: MEDICARE

## 2022-08-02 VITALS
DIASTOLIC BLOOD PRESSURE: 85 MMHG | WEIGHT: 247.5 LBS | BODY MASS INDEX: 46.73 KG/M2 | SYSTOLIC BLOOD PRESSURE: 136 MMHG | HEART RATE: 102 BPM | HEIGHT: 61 IN

## 2022-08-02 DIAGNOSIS — F41.1 GENERALIZED ANXIETY DISORDER: ICD-10-CM

## 2022-08-02 DIAGNOSIS — Z91.89 AT HIGH RISK FOR OSTEOPOROSIS: ICD-10-CM

## 2022-08-02 DIAGNOSIS — N92.1 MENORRHAGIA WITH IRREGULAR CYCLE: Primary | ICD-10-CM

## 2022-08-02 DIAGNOSIS — Z79.818 LONG TERM (CURRENT) USE OF OTHER AGENTS AFFECTING ESTROGEN RECEPTORS AND ESTROGEN LEVELS: ICD-10-CM

## 2022-08-02 DIAGNOSIS — Z12.31 ENCOUNTER FOR SCREENING MAMMOGRAM FOR MALIGNANT NEOPLASM OF BREAST: ICD-10-CM

## 2022-08-02 DIAGNOSIS — L73.2 HIDRADENITIS SUPPURATIVA: ICD-10-CM

## 2022-08-02 DIAGNOSIS — E66.01 MORBIDLY OBESE: ICD-10-CM

## 2022-08-02 PROCEDURE — 99213 PR OFFICE/OUTPT VISIT, EST, LEVL III, 20-29 MIN: ICD-10-PCS | Mod: S$GLB,,, | Performed by: NURSE PRACTITIONER

## 2022-08-02 PROCEDURE — 4010F PR ACE/ARB THEARPY RXD/TAKEN: ICD-10-PCS | Mod: CPTII,S$GLB,, | Performed by: NURSE PRACTITIONER

## 2022-08-02 PROCEDURE — 1160F RVW MEDS BY RX/DR IN RCRD: CPT | Mod: CPTII,S$GLB,, | Performed by: NURSE PRACTITIONER

## 2022-08-02 PROCEDURE — 3008F PR BODY MASS INDEX (BMI) DOCUMENTED: ICD-10-PCS | Mod: CPTII,S$GLB,, | Performed by: NURSE PRACTITIONER

## 2022-08-02 PROCEDURE — 3075F PR MOST RECENT SYSTOLIC BLOOD PRESS GE 130-139MM HG: ICD-10-PCS | Mod: CPTII,S$GLB,, | Performed by: NURSE PRACTITIONER

## 2022-08-02 PROCEDURE — 1159F PR MEDICATION LIST DOCUMENTED IN MEDICAL RECORD: ICD-10-PCS | Mod: CPTII,S$GLB,, | Performed by: NURSE PRACTITIONER

## 2022-08-02 PROCEDURE — 1160F PR REVIEW ALL MEDS BY PRESCRIBER/CLIN PHARMACIST DOCUMENTED: ICD-10-PCS | Mod: CPTII,S$GLB,, | Performed by: NURSE PRACTITIONER

## 2022-08-02 PROCEDURE — 3079F DIAST BP 80-89 MM HG: CPT | Mod: CPTII,S$GLB,, | Performed by: NURSE PRACTITIONER

## 2022-08-02 PROCEDURE — 4010F ACE/ARB THERAPY RXD/TAKEN: CPT | Mod: CPTII,S$GLB,, | Performed by: NURSE PRACTITIONER

## 2022-08-02 PROCEDURE — 3079F PR MOST RECENT DIASTOLIC BLOOD PRESSURE 80-89 MM HG: ICD-10-PCS | Mod: CPTII,S$GLB,, | Performed by: NURSE PRACTITIONER

## 2022-08-02 PROCEDURE — 99213 OFFICE O/P EST LOW 20 MIN: CPT | Mod: S$GLB,,, | Performed by: NURSE PRACTITIONER

## 2022-08-02 PROCEDURE — 1159F MED LIST DOCD IN RCRD: CPT | Mod: CPTII,S$GLB,, | Performed by: NURSE PRACTITIONER

## 2022-08-02 PROCEDURE — 3075F SYST BP GE 130 - 139MM HG: CPT | Mod: CPTII,S$GLB,, | Performed by: NURSE PRACTITIONER

## 2022-08-02 PROCEDURE — 3008F BODY MASS INDEX DOCD: CPT | Mod: CPTII,S$GLB,, | Performed by: NURSE PRACTITIONER

## 2022-08-02 RX ORDER — CLONAZEPAM 1 MG/1
1 TABLET ORAL 2 TIMES DAILY PRN
Qty: 60 TABLET | Refills: 1 | Status: SHIPPED | OUTPATIENT
Start: 2022-08-02 | End: 2022-11-30

## 2022-08-02 RX ORDER — MEDROXYPROGESTERONE ACETATE 150 MG/ML
150 INJECTION, SUSPENSION INTRAMUSCULAR
Status: DISCONTINUED | OUTPATIENT
Start: 2022-08-02 | End: 2024-02-14

## 2022-08-02 NOTE — PROGRESS NOTES
"  Subjective:       Patient ID: Erica Law is a 42 y.o. female.    Chief Complaint:  Contraception (Want to change from pill to depo shot) and Abscess (Lower abd)      History of Present Illness  Pt here for follow up on the recent outbreaks of the Hiradentitis suppurativa. Hospitalized for I&D abscesses three times since starting the OCP's. PT did very well on Depo which she had taken for 13 years. We were trying to promote wt loss and protecting bone density but at this time the risk of prolong use of the Depo far outweighs the current problems.   Agrees to take Vit D   History and past labs reviewed with patient.    Complaints: increased HS outbreaks with GBS       Review of Systems  Review of Systems   Integumentary:         Frequent outbreaks of the HS with failed Humaria therapy           Objective:     Vitals:    08/02/22 0857   BP: 136/85   Pulse: 102   Weight: 112.3 kg (247 lb 8 oz)   Height: 5' 1" (1.549 m)        Physical Exam:   Constitutional: She is oriented to person, place, and time.       Cardiovascular: Normal rate.     Pulmonary/Chest: Effort normal.          Genitourinary:    Genitourinary Comments: Lesions are healing on buttocks and under arms                   Neurological: She is oriented to person, place, and time.     Psychiatric: She has a normal mood and affect. Her behavior is normal.           Assessment:     1. Menorrhagia with irregular cycle    2. Hidradenitis suppurativa    3. Morbidly obese    4. At high risk for osteoporosis    5. Long term (current) use of other agents affecting estrogen receptors and estrogen levels     6. Encounter for screening mammogram for malignant neoplasm of breast              Plan:       Menorrhagia with irregular cycle  -     medroxyPROGESTERone (DEPO-PROVERA) injection 150 mg    Hidradenitis suppurativa    Morbidly obese    At high risk for osteoporosis  -     DXA Bone Density Spine And Hip; Future; Expected date: 08/02/2022    Long term (current) " use of other agents affecting estrogen receptors and estrogen levels   -     DXA Bone Density Spine And Hip; Future; Expected date: 08/02/2022    Encounter for screening mammogram for malignant neoplasm of breast  -     Mammo Digital Screening Bilat; Future; Expected date: 08/02/2022         Follow up if symptoms worsen or fail to improve, for 12 weeks nurse visit for Estela

## 2022-08-03 ENCOUNTER — PATIENT MESSAGE (OUTPATIENT)
Dept: ADMINISTRATIVE | Facility: HOSPITAL | Age: 42
End: 2022-08-03
Payer: MEDICAID

## 2022-08-03 ENCOUNTER — OFFICE VISIT (OUTPATIENT)
Dept: FAMILY MEDICINE | Facility: CLINIC | Age: 42
End: 2022-08-03
Payer: MEDICARE

## 2022-08-03 VITALS
SYSTOLIC BLOOD PRESSURE: 114 MMHG | BODY MASS INDEX: 47.01 KG/M2 | HEIGHT: 61 IN | WEIGHT: 249 LBS | HEART RATE: 121 BPM | RESPIRATION RATE: 14 BRPM | DIASTOLIC BLOOD PRESSURE: 58 MMHG | OXYGEN SATURATION: 100 %

## 2022-08-03 DIAGNOSIS — E11.69 TYPE 2 DIABETES MELLITUS WITH OTHER SPECIFIED COMPLICATION, WITHOUT LONG-TERM CURRENT USE OF INSULIN: ICD-10-CM

## 2022-08-03 DIAGNOSIS — E03.9 HYPOTHYROIDISM, UNSPECIFIED TYPE: ICD-10-CM

## 2022-08-03 DIAGNOSIS — L02.91 ABSCESS: Primary | ICD-10-CM

## 2022-08-03 LAB
ABS NRBC COUNT: 0 X 10 3/UL (ref 0–0.01)
ABSOLUTE BASOPHIL: 0.04 X 10 3/UL (ref 0–0.22)
ABSOLUTE EOSINOPHIL: 0.23 X 10 3/UL (ref 0.04–0.54)
ABSOLUTE IMMATURE GRAN: 0.06 X 10 3/UL (ref 0–0.04)
ABSOLUTE LYMPHOCYTE: 2.79 X 10 3/UL (ref 0.86–4.75)
ABSOLUTE MONOCYTE: 0.48 X 10 3/UL (ref 0.22–1.08)
ALBUMIN SERPL-MCNC: 3.7 G/DL (ref 3.5–5.2)
ALBUMIN/GLOB SERPL ELPH: 0.8 {RATIO} (ref 1–2.7)
ALP ISOS SERPL LEV INH-CCNC: 75 U/L (ref 35–105)
ALT (SGPT): 15 U/L (ref 0–33)
AMORPH URATE CRY URNS QL MICRO: ABNORMAL
ANION GAP SERPL CALC-SCNC: 14 MMOL/L (ref 8–17)
AST SERPL-CCNC: 29 U/L (ref 0–32)
BACTERIA #/AREA URNS HPF: ABNORMAL /[HPF]
BASOPHILS NFR BLD: 0.3 % (ref 0.2–1.2)
BILIRUB UR QL STRIP: NEGATIVE
BILIRUBIN, TOTAL: 0.16 MG/DL (ref 0–1.2)
BUN/CREAT SERPL: 14.8 (ref 6–20)
CALCIUM SERPL-MCNC: 8.6 MG/DL (ref 8.6–10.2)
CARBON DIOXIDE, CO2: 17 MMOL/L (ref 22–29)
CHLORIDE: 98 MMOL/L (ref 98–107)
CLARITY UR: ABNORMAL
COLOR UR: YELLOW
CREAT SERPL-MCNC: 1.34 MG/DL (ref 0.5–0.9)
EOSINOPHIL NFR BLD: 2 % (ref 0.7–7)
EPITHELIAL CELLS: ABNORMAL
ESTIMATED AVERAGE GLUCOSE: 90 MG/DL
GFR ESTIMATION: 43.59
GLOBULIN: 4.7 G/DL (ref 1.5–4.5)
GLUCOSE (UA): NEGATIVE MG/DL
GLUCOSE: 240 MG/DL (ref 74–106)
HBA1C MFR BLD: 4.8 % (ref 4–6)
HCT VFR BLD AUTO: 32.7 % (ref 37–47)
HGB BLD-MCNC: 10.1 G/DL (ref 12–16)
IMMATURE GRANULOCYTES: 0.5 % (ref 0–0.5)
KETONES UR QL STRIP: NEGATIVE MG/DL
LEUKOCYTE ESTERASE UR QL STRIP: NEGATIVE
LYMPHOCYTES NFR BLD: 24.3 % (ref 19.3–53.1)
MCH RBC QN AUTO: 29.4 PG (ref 27–32)
MCHC RBC AUTO-ENTMCNC: 30.9 G/DL (ref 32–36)
MCV RBC AUTO: 95.3 FL (ref 82–100)
MONOCYTES NFR BLD: 4.2 % (ref 4.7–12.5)
MUCOUS THREADS URNS QL MICRO: ABNORMAL
NEUTROPHILS # BLD AUTO: 7.87 X 10 3/UL (ref 2.15–7.56)
NEUTROPHILS NFR BLD: 68.7 % (ref 34–71.1)
NITRITE UR QL STRIP: NEGATIVE
NUCLEATED RED BLOOD CELLS: 0 /100 WBC (ref 0–0.2)
OCCULT BLOOD: NEGATIVE
PH, URINE: 5 (ref 5–7.5)
PLATELET # BLD AUTO: 382 X 10 3/UL (ref 135–400)
POTASSIUM: 4.2 MMOL/L (ref 3.5–5.1)
PROT SNV-MCNC: 8.4 G/DL (ref 6.4–8.3)
PROT UR QL STRIP: 150 MG/DL
RBC # BLD AUTO: 3.43 X 10 6/UL (ref 4.2–5.4)
RBC/HPF: NEGATIVE
RDW-SD: 48.7 FL (ref 37–54)
SODIUM: 129 MMOL/L (ref 136–145)
SP GR UR STRIP: 1.02 (ref 1–1.03)
T4, FREE: 1.32 NG/DL (ref 0.93–1.7)
TSH SERPL DL<=0.005 MIU/L-ACNC: 5.86 UIU/ML (ref 0.27–4.2)
UREA NITROGEN (BUN): 19.8 MG/DL (ref 6–20)
UROBILINOGEN, URINE: NORMAL E.U./DL (ref 0–1)
WBC # BLD: 11.47 X 10 3/UL (ref 4.3–10.8)
WBC/HPF: ABNORMAL

## 2022-08-03 PROCEDURE — 99215 OFFICE O/P EST HI 40 MIN: CPT | Mod: S$GLB,,, | Performed by: FAMILY MEDICINE

## 2022-08-03 PROCEDURE — 99215 PR OFFICE/OUTPT VISIT, EST, LEVL V, 40-54 MIN: ICD-10-PCS | Mod: S$GLB,,, | Performed by: FAMILY MEDICINE

## 2022-08-03 PROCEDURE — 3008F PR BODY MASS INDEX (BMI) DOCUMENTED: ICD-10-PCS | Mod: CPTII,S$GLB,, | Performed by: FAMILY MEDICINE

## 2022-08-03 PROCEDURE — 1160F RVW MEDS BY RX/DR IN RCRD: CPT | Mod: CPTII,S$GLB,, | Performed by: FAMILY MEDICINE

## 2022-08-03 PROCEDURE — 3074F SYST BP LT 130 MM HG: CPT | Mod: CPTII,S$GLB,, | Performed by: FAMILY MEDICINE

## 2022-08-03 PROCEDURE — 4010F PR ACE/ARB THEARPY RXD/TAKEN: ICD-10-PCS | Mod: CPTII,S$GLB,, | Performed by: FAMILY MEDICINE

## 2022-08-03 PROCEDURE — 1159F MED LIST DOCD IN RCRD: CPT | Mod: CPTII,S$GLB,, | Performed by: FAMILY MEDICINE

## 2022-08-03 PROCEDURE — 4010F ACE/ARB THERAPY RXD/TAKEN: CPT | Mod: CPTII,S$GLB,, | Performed by: FAMILY MEDICINE

## 2022-08-03 PROCEDURE — 1159F PR MEDICATION LIST DOCUMENTED IN MEDICAL RECORD: ICD-10-PCS | Mod: CPTII,S$GLB,, | Performed by: FAMILY MEDICINE

## 2022-08-03 PROCEDURE — 3078F PR MOST RECENT DIASTOLIC BLOOD PRESSURE < 80 MM HG: ICD-10-PCS | Mod: CPTII,S$GLB,, | Performed by: FAMILY MEDICINE

## 2022-08-03 PROCEDURE — 3074F PR MOST RECENT SYSTOLIC BLOOD PRESSURE < 130 MM HG: ICD-10-PCS | Mod: CPTII,S$GLB,, | Performed by: FAMILY MEDICINE

## 2022-08-03 PROCEDURE — 3008F BODY MASS INDEX DOCD: CPT | Mod: CPTII,S$GLB,, | Performed by: FAMILY MEDICINE

## 2022-08-03 PROCEDURE — 3078F DIAST BP <80 MM HG: CPT | Mod: CPTII,S$GLB,, | Performed by: FAMILY MEDICINE

## 2022-08-03 PROCEDURE — 1160F PR REVIEW ALL MEDS BY PRESCRIBER/CLIN PHARMACIST DOCUMENTED: ICD-10-PCS | Mod: CPTII,S$GLB,, | Performed by: FAMILY MEDICINE

## 2022-08-03 NOTE — PROGRESS NOTES
Subjective:       Patient ID: Erica Law is a 42 y.o. female.    Chief Complaint: Follow-up, Dehydration (Patient verbalizes she has had multiple episodes of dehydration since hospitalization 04/2022. ), and Abscess    HPI     F/u groin abscess -   Pt was seen by gyn who advised urgent surgery, however pt elected to postpone   She went to  later on and had I&D and has been on multiple rounds of abx including bactrim, rocephin, amoxil most recently  She was put on amoxil after a cx done at  returned as group b strep (contaminant?)  She notes area is larger and more tender and has had BLE edema for about a week  She feels fatigued and weak    Review of Systems   Constitutional: Negative for chills, diaphoresis, fatigue, fever and unexpected weight change.   HENT: Negative for nasal congestion, hearing loss, rhinorrhea, sinus pressure/congestion, sore throat, trouble swallowing and voice change.    Eyes: Negative for pain and visual disturbance.   Respiratory: Negative for cough, chest tightness, shortness of breath and wheezing.    Cardiovascular: Negative for chest pain, palpitations and leg swelling.   Gastrointestinal: Negative for abdominal pain, constipation, diarrhea, nausea and vomiting.   Endocrine: Negative for polydipsia and polyuria.   Genitourinary: Negative for decreased urine volume, dysuria, flank pain, frequency, hematuria, pelvic pain, vaginal bleeding and vaginal discharge.   Musculoskeletal: Negative for arthralgias, back pain, myalgias and neck pain.   Integumentary:  Negative for color change, pallor and rash.   Neurological: Negative for dizziness, syncope, weakness, light-headedness and headaches.   Hematological: Negative for adenopathy.   Psychiatric/Behavioral: Negative for decreased concentration, dysphoric mood and sleep disturbance. The patient is not nervous/anxious.          Objective:      Physical Exam  Vitals reviewed.   Constitutional:       General: She is not in acute  distress.     Appearance: She is well-developed. She is not diaphoretic.   HENT:      Head: Normocephalic and atraumatic.      Nose: Nose normal.   Eyes:      Conjunctiva/sclera: Conjunctivae normal.      Pupils: Pupils are equal, round, and reactive to light.   Neck:      Thyroid: No thyromegaly.      Vascular: No JVD.   Cardiovascular:      Rate and Rhythm: Regular rhythm. Tachycardia present.      Heart sounds: Normal heart sounds. No murmur heard.    No friction rub. No gallop.   Pulmonary:      Effort: Pulmonary effort is normal. No respiratory distress.      Breath sounds: Normal breath sounds. No stridor. No wheezing or rales.   Abdominal:      General: Bowel sounds are normal. There is no distension.      Palpations: Abdomen is soft.      Tenderness: There is no abdominal tenderness.   Genitourinary:     Comments: Large abscess of groin with surrounding edema  Musculoskeletal:         General: No tenderness.      Cervical back: Normal range of motion and neck supple.      Right lower leg: Edema present.      Left lower leg: Edema present.   Lymphadenopathy:      Cervical: No cervical adenopathy.   Skin:     General: Skin is warm and dry.      Coloration: Skin is not pale.      Findings: No erythema or rash.   Neurological:      Mental Status: She is alert and oriented to person, place, and time.   Psychiatric:         Mood and Affect: Mood normal.         Behavior: Behavior normal.         Assessment:       Problem List Items Addressed This Visit        Endocrine    Hypothyroidism    Relevant Orders    TSH    T4, Free    Type 2 diabetes mellitus, without long-term current use of insulin    Relevant Orders    Hemoglobin A1C      Other Visit Diagnoses     Abscess    -  Primary    Relevant Orders    CBC Auto Differential    Comprehensive Metabolic Panel    Urinalysis    CT Pelvis With Contrast          Plan:       Abscess  -     CBC Auto Differential; Future; Expected date: 08/03/2022  -     Comprehensive  Metabolic Panel; Future; Expected date: 08/03/2022  -     Urinalysis; Future; Expected date: 08/03/2022  -     CT Pelvis With Contrast; Future; Expected date: 08/03/2022    Type 2 diabetes mellitus with other specified complication, without long-term current use of insulin  -     Hemoglobin A1C; Future; Expected date: 08/03/2022    Hypothyroidism, unspecified type  -     TSH; Future; Expected date: 08/03/2022  -     T4, Free; Future; Expected date: 08/03/2022    sent to  for fluids today, stat ct tomorrow am to eval for size/extent of infection  Counseled extensively today about need for urgent treatment, pt expresses understanding  Will also check a1c, tfts

## 2022-08-04 ENCOUNTER — PATIENT MESSAGE (OUTPATIENT)
Dept: FAMILY MEDICINE | Facility: CLINIC | Age: 42
End: 2022-08-04
Payer: MEDICAID

## 2022-08-04 DIAGNOSIS — R92.8 ABNORMAL MAMMOGRAM OF RIGHT BREAST: Primary | ICD-10-CM

## 2022-08-04 RX ORDER — CLINDAMYCIN HYDROCHLORIDE 300 MG/1
300 CAPSULE ORAL EVERY 6 HOURS
Qty: 28 CAPSULE | Refills: 0 | Status: SHIPPED | OUTPATIENT
Start: 2022-08-04 | End: 2022-08-11

## 2022-08-08 ENCOUNTER — OFFICE VISIT (OUTPATIENT)
Dept: FAMILY MEDICINE | Facility: CLINIC | Age: 42
End: 2022-08-08
Payer: MEDICARE

## 2022-08-08 VITALS
SYSTOLIC BLOOD PRESSURE: 115 MMHG | OXYGEN SATURATION: 99 % | BODY MASS INDEX: 48.52 KG/M2 | WEIGHT: 257 LBS | DIASTOLIC BLOOD PRESSURE: 59 MMHG | HEART RATE: 120 BPM | HEIGHT: 61 IN

## 2022-08-08 DIAGNOSIS — R00.0 TACHYCARDIA: ICD-10-CM

## 2022-08-08 DIAGNOSIS — D64.9 ANEMIA, UNSPECIFIED TYPE: ICD-10-CM

## 2022-08-08 DIAGNOSIS — E03.9 HYPOTHYROIDISM, UNSPECIFIED TYPE: ICD-10-CM

## 2022-08-08 DIAGNOSIS — M79.89 LOCALIZED SWELLING OF BOTH LOWER EXTREMITIES: ICD-10-CM

## 2022-08-08 DIAGNOSIS — L73.2 HIDRADENITIS SUPPURATIVA: ICD-10-CM

## 2022-08-08 DIAGNOSIS — R06.01 ORTHOPNEA: Primary | ICD-10-CM

## 2022-08-08 DIAGNOSIS — E11.69 TYPE 2 DIABETES MELLITUS WITH OTHER SPECIFIED COMPLICATION, WITHOUT LONG-TERM CURRENT USE OF INSULIN: ICD-10-CM

## 2022-08-08 LAB — BNP SERPL-MCNC: 26 PG/ML (ref 0–100)

## 2022-08-08 PROCEDURE — 4010F PR ACE/ARB THEARPY RXD/TAKEN: ICD-10-PCS | Mod: CPTII,S$GLB,, | Performed by: PHYSICIAN ASSISTANT

## 2022-08-08 PROCEDURE — 99214 PR OFFICE/OUTPT VISIT, EST, LEVL IV, 30-39 MIN: ICD-10-PCS | Mod: S$GLB,,, | Performed by: PHYSICIAN ASSISTANT

## 2022-08-08 PROCEDURE — 3078F DIAST BP <80 MM HG: CPT | Mod: CPTII,S$GLB,, | Performed by: PHYSICIAN ASSISTANT

## 2022-08-08 PROCEDURE — 3074F SYST BP LT 130 MM HG: CPT | Mod: CPTII,S$GLB,, | Performed by: PHYSICIAN ASSISTANT

## 2022-08-08 PROCEDURE — 3008F PR BODY MASS INDEX (BMI) DOCUMENTED: ICD-10-PCS | Mod: CPTII,S$GLB,, | Performed by: PHYSICIAN ASSISTANT

## 2022-08-08 PROCEDURE — 1160F RVW MEDS BY RX/DR IN RCRD: CPT | Mod: CPTII,S$GLB,, | Performed by: PHYSICIAN ASSISTANT

## 2022-08-08 PROCEDURE — 3044F PR MOST RECENT HEMOGLOBIN A1C LEVEL <7.0%: ICD-10-PCS | Mod: CPTII,S$GLB,, | Performed by: PHYSICIAN ASSISTANT

## 2022-08-08 PROCEDURE — 3008F BODY MASS INDEX DOCD: CPT | Mod: CPTII,S$GLB,, | Performed by: PHYSICIAN ASSISTANT

## 2022-08-08 PROCEDURE — 3044F HG A1C LEVEL LT 7.0%: CPT | Mod: CPTII,S$GLB,, | Performed by: PHYSICIAN ASSISTANT

## 2022-08-08 PROCEDURE — 1160F PR REVIEW ALL MEDS BY PRESCRIBER/CLIN PHARMACIST DOCUMENTED: ICD-10-PCS | Mod: CPTII,S$GLB,, | Performed by: PHYSICIAN ASSISTANT

## 2022-08-08 PROCEDURE — 3078F PR MOST RECENT DIASTOLIC BLOOD PRESSURE < 80 MM HG: ICD-10-PCS | Mod: CPTII,S$GLB,, | Performed by: PHYSICIAN ASSISTANT

## 2022-08-08 PROCEDURE — 99214 OFFICE O/P EST MOD 30 MIN: CPT | Mod: S$GLB,,, | Performed by: PHYSICIAN ASSISTANT

## 2022-08-08 PROCEDURE — 4010F ACE/ARB THERAPY RXD/TAKEN: CPT | Mod: CPTII,S$GLB,, | Performed by: PHYSICIAN ASSISTANT

## 2022-08-08 PROCEDURE — 1159F PR MEDICATION LIST DOCUMENTED IN MEDICAL RECORD: ICD-10-PCS | Mod: CPTII,S$GLB,, | Performed by: PHYSICIAN ASSISTANT

## 2022-08-08 PROCEDURE — 1159F MED LIST DOCD IN RCRD: CPT | Mod: CPTII,S$GLB,, | Performed by: PHYSICIAN ASSISTANT

## 2022-08-08 PROCEDURE — 3074F PR MOST RECENT SYSTOLIC BLOOD PRESSURE < 130 MM HG: ICD-10-PCS | Mod: CPTII,S$GLB,, | Performed by: PHYSICIAN ASSISTANT

## 2022-08-08 RX ORDER — DULAGLUTIDE 0.75 MG/.5ML
0.75 INJECTION, SOLUTION SUBCUTANEOUS
Qty: 4 PEN | Refills: 3 | Status: SHIPPED | OUTPATIENT
Start: 2022-08-08 | End: 2022-08-22

## 2022-08-08 NOTE — PROGRESS NOTES
Subjective:      Patient ID: Erica Law is a 42 y.o. female.    Chief Complaint: Follow-up (Patient is here for a follow up visit )      HPI  Pt is here today for followup for lab followup, dehydration and hydradenitis / abscess followup, new complaints .     Hydradenitis - No deep abscess on CT from last week.  Taking clinda, area still draining.  No fever, no chills, no nausea or vomiting.  Has follow-up appointment upcoming with Dr. Harding    Dehydration-patient reports multiple episodes of dehydration since her hospitalization in April of this year.  Reports she drinks 7-8 bottles of water a day.  Her heart rate was elevated at last week's visit as well as today and on my recheck.    Labs-  CBC- mildly elevated WBC ct, chronic anemia - see heme onc    CMP-Elevated glucose and Cr , decreased GFR- sees Dr. Dodd.  GFR at 43 down from 51 .  Will make med adjustments today.    UA- proteinuria     New-  New onset lower extremity edema x1 week.  Does improve somewhat with elevation.  She reports 3 pillow orthopnea.      Review of Systems   Constitutional: Negative for activity change, appetite change, chills, fatigue and fever.   HENT: Negative for nasal congestion, facial swelling and rhinorrhea.    Eyes: Negative for pain and visual disturbance.   Respiratory: Negative for cough, chest tightness and shortness of breath.    Cardiovascular: Positive for leg swelling. Negative for chest pain, palpitations and claudication.   Gastrointestinal: Negative for abdominal pain, blood in stool, constipation, diarrhea, nausea, vomiting and reflux.   Endocrine: Negative for polydipsia and polyuria.   Genitourinary: Negative for difficulty urinating, flank pain and frequency.   Musculoskeletal: Negative for arthralgias and back pain.   Neurological: Negative for dizziness, weakness, light-headedness, numbness and headaches.   Psychiatric/Behavioral: Negative for self-injury, sleep disturbance and suicidal ideas. The patient  "is not nervous/anxious.        Medication List with Changes/Refills   New Medications    DULAGLUTIDE (TRULICITY) 0.75 MG/0.5 ML PEN INJECTOR    Inject 0.75 mg into the skin every 7 days.   Current Medications    ATORVASTATIN (LIPITOR) 40 MG TABLET    TAKE 1 TABLET BY MOUTH EVERY DAY    CLINDAMYCIN (CLEOCIN) 300 MG CAPSULE    Take 1 capsule (300 mg total) by mouth every 6 (six) hours. for 7 days    CLINDAMYCIN PHOSPHATE 1% (CLINDAGEL) 1 % GEL        CLONAZEPAM (KLONOPIN) 1 MG TABLET    Take 1 tablet (1 mg total) by mouth 2 (two) times daily as needed for Anxiety.    ESCITALOPRAM OXALATE (LEXAPRO) 10 MG TABLET    TAKE 1 TABLET BY MOUTH EVERY DAY    GABAPENTIN (NEURONTIN) 300 MG CAPSULE    TAKE 1 CAPSULE BY MOUTH THREE TIMES A DAY    HUMIRA,CF, PEN 40 MG/0.4 ML PNKT        IRON 100 PLUS TAB    TAKE 1 TABLET BY MOUTH TWICE A DAY    LEVOTHYROXINE (SYNTHROID) 50 MCG TABLET    TAKE 1 TABLET BY MOUTH BEFORE BREAKFAST.    METFORMIN (GLUCOPHAGE) 500 MG TABLET    TAKE 1 TABLET BY MOUTH TWICE A DAY WITH MEALS    NIFEDIPINE (ADALAT CC) 60 MG TBSR    TAKE 1 TABLET BY MOUTH EVERY DAY    PANTOPRAZOLE (PROTONIX) 40 MG TABLET    40 mg.        Objective:     Vitals:    08/08/22 0806   BP: (!) 115/59   Pulse: (!) 120   SpO2: 99%   Weight: 116.6 kg (257 lb)   Height: 5' 1" (1.549 m)        Physical Exam  Constitutional:       Appearance: Normal appearance. She is obese.   HENT:      Head: Normocephalic and atraumatic.      Right Ear: External ear normal.      Left Ear: External ear normal.      Nose: Nose normal.   Eyes:      Extraocular Movements: Extraocular movements intact.      Conjunctiva/sclera: Conjunctivae normal.      Pupils: Pupils are equal, round, and reactive to light.   Cardiovascular:      Rate and Rhythm: Tachycardia present.      Pulses: Normal pulses.      Heart sounds: Normal heart sounds.   Abdominal:      General: Abdomen is flat.      Palpations: Abdomen is soft.   Musculoskeletal:      Cervical back: Normal range " of motion and neck supple.      Comments: Moves all extremities well and with good control    Skin:     General: Skin is warm and dry.      Capillary Refill: Capillary refill takes less than 2 seconds.      Comments: Multiple inflamed draining follicles over the SP area   Neurological:      Mental Status: She is alert and oriented to person, place, and time.   Psychiatric:         Mood and Affect: Mood normal.         Behavior: Behavior normal.            Assessment & Plan:     Orthopnea  -     BNP; Future; Expected date: 08/08/2022  -     Echo; Future  -     Cancel: X-Ray Chest PA And Lateral; Future; Expected date: 08/08/2022  -     X-Ray Chest PA And Lateral; Future; Expected date: 08/08/2022    Tachycardia  -     BNP; Future; Expected date: 08/08/2022  -     Echo; Future  -     Cancel: X-Ray Chest PA And Lateral; Future; Expected date: 08/08/2022  -     X-Ray Chest PA And Lateral; Future; Expected date: 08/08/2022    Localized swelling of both lower extremities  -     BNP; Future; Expected date: 08/08/2022  -     Echo; Future  -     Cancel: X-Ray Chest PA And Lateral; Future; Expected date: 08/08/2022  -     X-Ray Chest PA And Lateral; Future; Expected date: 08/08/2022    Type 2 diabetes mellitus with other specified complication, without long-term current use of insulin  Comments:  Stop metformin  Orders:  -     dulaglutide (TRULICITY) 0.75 mg/0.5 mL pen injector; Inject 0.75 mg into the skin every 7 days.  Dispense: 4 pen; Refill: 3    Hidradenitis suppurativa  Comments:  Follow-up with dermatology    Hypothyroidism, unspecified type  Comments:  Thyroid function test within normal limits    Anemia, unspecified type  Comments:  Keep follow-up with heme Onc           Follow up in about 2 weeks (around 8/22/2022).       -Patient instructed that our office calls back on all labs and imaging within 1 week of receiving the results. Patient instructed to reach out to our office if they have not heard from us so that  we can request the results from the lab/imaging center           Pushpa Kinsey PA-C

## 2022-08-09 ENCOUNTER — PATIENT MESSAGE (OUTPATIENT)
Dept: FAMILY MEDICINE | Facility: CLINIC | Age: 42
End: 2022-08-09
Payer: MEDICAID

## 2022-08-11 ENCOUNTER — PATIENT MESSAGE (OUTPATIENT)
Dept: FAMILY MEDICINE | Facility: CLINIC | Age: 42
End: 2022-08-11
Payer: MEDICAID

## 2022-08-11 ENCOUNTER — PATIENT MESSAGE (OUTPATIENT)
Dept: OBSTETRICS AND GYNECOLOGY | Facility: CLINIC | Age: 42
End: 2022-08-11

## 2022-08-11 ENCOUNTER — OFFICE VISIT (OUTPATIENT)
Dept: OBSTETRICS AND GYNECOLOGY | Facility: CLINIC | Age: 42
End: 2022-08-11
Payer: MEDICARE

## 2022-08-11 VITALS
WEIGHT: 251.5 LBS | BODY MASS INDEX: 47.48 KG/M2 | HEIGHT: 61 IN | SYSTOLIC BLOOD PRESSURE: 111 MMHG | DIASTOLIC BLOOD PRESSURE: 81 MMHG | HEART RATE: 109 BPM

## 2022-08-11 DIAGNOSIS — M06.09 RHEUMATOID ARTHRITIS OF MULTIPLE SITES WITH NEGATIVE RHEUMATOID FACTOR: Primary | ICD-10-CM

## 2022-08-11 DIAGNOSIS — L73.2 HIDRADENITIS SUPPURATIVA: Primary | ICD-10-CM

## 2022-08-11 DIAGNOSIS — E66.01 MORBIDLY OBESE: ICD-10-CM

## 2022-08-11 DIAGNOSIS — Z91.89 AT HIGH RISK FOR OSTEOPOROSIS: ICD-10-CM

## 2022-08-11 PROCEDURE — 3079F PR MOST RECENT DIASTOLIC BLOOD PRESSURE 80-89 MM HG: ICD-10-PCS | Mod: CPTII,S$GLB,, | Performed by: NURSE PRACTITIONER

## 2022-08-11 PROCEDURE — 3008F BODY MASS INDEX DOCD: CPT | Mod: CPTII,S$GLB,, | Performed by: NURSE PRACTITIONER

## 2022-08-11 PROCEDURE — 99213 OFFICE O/P EST LOW 20 MIN: CPT | Mod: S$GLB,,, | Performed by: NURSE PRACTITIONER

## 2022-08-11 PROCEDURE — 3044F PR MOST RECENT HEMOGLOBIN A1C LEVEL <7.0%: ICD-10-PCS | Mod: CPTII,S$GLB,, | Performed by: NURSE PRACTITIONER

## 2022-08-11 PROCEDURE — 3044F HG A1C LEVEL LT 7.0%: CPT | Mod: CPTII,S$GLB,, | Performed by: NURSE PRACTITIONER

## 2022-08-11 PROCEDURE — 4010F ACE/ARB THERAPY RXD/TAKEN: CPT | Mod: CPTII,S$GLB,, | Performed by: NURSE PRACTITIONER

## 2022-08-11 PROCEDURE — 3079F DIAST BP 80-89 MM HG: CPT | Mod: CPTII,S$GLB,, | Performed by: NURSE PRACTITIONER

## 2022-08-11 PROCEDURE — 4010F PR ACE/ARB THEARPY RXD/TAKEN: ICD-10-PCS | Mod: CPTII,S$GLB,, | Performed by: NURSE PRACTITIONER

## 2022-08-11 PROCEDURE — 3008F PR BODY MASS INDEX (BMI) DOCUMENTED: ICD-10-PCS | Mod: CPTII,S$GLB,, | Performed by: NURSE PRACTITIONER

## 2022-08-11 PROCEDURE — 3074F PR MOST RECENT SYSTOLIC BLOOD PRESSURE < 130 MM HG: ICD-10-PCS | Mod: CPTII,S$GLB,, | Performed by: NURSE PRACTITIONER

## 2022-08-11 PROCEDURE — 99213 PR OFFICE/OUTPT VISIT, EST, LEVL III, 20-29 MIN: ICD-10-PCS | Mod: S$GLB,,, | Performed by: NURSE PRACTITIONER

## 2022-08-11 PROCEDURE — 3074F SYST BP LT 130 MM HG: CPT | Mod: CPTII,S$GLB,, | Performed by: NURSE PRACTITIONER

## 2022-08-11 RX ORDER — AZITHROMYCIN 250 MG/1
TABLET, FILM COATED ORAL
COMMUNITY
Start: 2022-08-10 | End: 2023-05-19

## 2022-08-11 RX ORDER — AMOXICILLIN 875 MG/1
TABLET, FILM COATED ORAL
COMMUNITY
Start: 2022-07-28 | End: 2022-08-17

## 2022-08-11 NOTE — PROGRESS NOTES
"    Subjective:       Patient ID: Erica Law is a 42 y.o. female.    Chief Complaint:  Vaginal Pain and Vulvodynia      History of Present Illness   Presents for vaginal pain and wounds.   Reports several ER visits and Urgent care visit with I&D and several different antibiotics. Culture reveals Strep infection and treated with amoxicillin.   Recent CT reveals skin thickening no abscess visualized.   Unable to take Humira regularly r/t frequent infections   Echo normal  BNP-negative     OB History        2    Para   1    Term   1            AB   1    Living   1       SAB   1    IAB        Ectopic        Multiple        Live Births   1                  Review of Systems  Review of Systems   Constitutional: Negative.    Respiratory: Negative.    Cardiovascular: Negative.    Gastrointestinal: Negative.    Genitourinary: Positive for genital sores, vaginal pain and vaginal odor. Negative for bladder incontinence, dysuria, frequency, menstrual problem, vaginal bleeding and vaginal discharge.   Musculoskeletal: Negative.    Integumentary:  Positive for mole/lesion.   Psychiatric/Behavioral: Negative.    Breast: negative.            Objective:     Vitals:    22 1309   BP: 111/81   Pulse: 109   Weight: 114.1 kg (251 lb 8 oz)   Height: 5' 1" (1.549 m)        Physical Exam:   Constitutional: She is oriented to person, place, and time. She appears well-developed and well-nourished.    HENT:   Head: Normocephalic and atraumatic.       Pulmonary/Chest: Effort normal.        Abdominal: Soft. There is no abdominal tenderness.     Genitourinary:    Pelvic exam was performed with patient supine.   External genitalia lesions present.    Genitourinary Comments: multiple areas of erythema, edema, and draining wound  to mons pubis              Musculoskeletal: Moves all extremeties. Edema present.       Neurological: She is alert and oriented to person, place, and time.    Skin: Skin is warm and dry.  "   Psychiatric: She has a normal mood and affect. Her behavior is normal. Judgment and thought content normal.          Assessment:     1. Hidradenitis suppurativa    2. Morbidly obese              Plan:       Hidradenitis suppurativa  -     Ambulatory referral/consult to General Surgery; Future; Expected date: 08/18/2022    Morbidly obese        Continue current meds  Keep area clean and dry       Follow up if symptoms worsen or fail to improve, for [facility] .

## 2022-08-12 DIAGNOSIS — R92.8 MAMMOGRAM ABNORMAL: Primary | ICD-10-CM

## 2022-08-12 LAB — BCS RECOMMENDATION EXT: NORMAL

## 2022-08-15 ENCOUNTER — PATIENT OUTREACH (OUTPATIENT)
Dept: ADMINISTRATIVE | Facility: HOSPITAL | Age: 42
End: 2022-08-15
Payer: MEDICAID

## 2022-08-15 ENCOUNTER — TELEPHONE (OUTPATIENT)
Dept: OBSTETRICS AND GYNECOLOGY | Facility: CLINIC | Age: 42
End: 2022-08-15
Payer: MEDICAID

## 2022-08-15 NOTE — TELEPHONE ENCOUNTER
----- Message from Molly Benedict NP sent at 8/12/2022 12:43 PM CDT -----  Please call pt and set up for apt for spot compression on RT breast please  Thanks  Molly RICO  Pt has schedule an appt for her spot compression.  Eliz

## 2022-08-16 ENCOUNTER — TELEPHONE (OUTPATIENT)
Dept: OBSTETRICS AND GYNECOLOGY | Facility: CLINIC | Age: 42
End: 2022-08-16
Payer: MEDICAID

## 2022-08-16 DIAGNOSIS — L73.2 HIDRADENITIS SUPPURATIVA: Primary | ICD-10-CM

## 2022-08-16 NOTE — TELEPHONE ENCOUNTER
----- Message from Prerna Collins MA sent at 8/15/2022  8:44 AM CDT -----  Dr Augustine has a BMI cutoff of 35 for all surgical pts. Thanks.  ----- Message -----  From: Lashonda Gomes MA  Sent: 8/15/2022   8:40 AM CDT  To: [facility]UE[facility] Staff    Pt has HS without abscess, General Surgery ask us to send to Plastic surgery.     I send Pt referral to [place].

## 2022-08-16 NOTE — TELEPHONE ENCOUNTER
----- Message from Mahin Jacobs sent at 8/16/2022 10:08 AM CDT -----  Contact: Patient  Patient need the nurse to call her regarding her referral      Who was it sent to?  What doctor?        Call back #  [phone]    Spoke to pt about her referral was send to [place], Pt really don't want to go to [place], Pt wants a Doctor closer. Pt states she will talk to Mrs. Barnes at her next appt,

## 2022-08-17 ENCOUNTER — OFFICE VISIT (OUTPATIENT)
Dept: OBSTETRICS AND GYNECOLOGY | Facility: CLINIC | Age: 42
End: 2022-08-17
Payer: MEDICARE

## 2022-08-17 VITALS
HEIGHT: 61 IN | BODY MASS INDEX: 46.75 KG/M2 | HEART RATE: 105 BPM | DIASTOLIC BLOOD PRESSURE: 84 MMHG | SYSTOLIC BLOOD PRESSURE: 127 MMHG | WEIGHT: 247.63 LBS

## 2022-08-17 DIAGNOSIS — L73.2 HIDRADENITIS SUPPURATIVA: Primary | ICD-10-CM

## 2022-08-17 DIAGNOSIS — N76.4 ABSCESS OF VULVA: ICD-10-CM

## 2022-08-17 PROCEDURE — 3079F PR MOST RECENT DIASTOLIC BLOOD PRESSURE 80-89 MM HG: ICD-10-PCS | Mod: CPTII,S$GLB,, | Performed by: NURSE PRACTITIONER

## 2022-08-17 PROCEDURE — 3044F PR MOST RECENT HEMOGLOBIN A1C LEVEL <7.0%: ICD-10-PCS | Mod: CPTII,S$GLB,, | Performed by: NURSE PRACTITIONER

## 2022-08-17 PROCEDURE — 1159F MED LIST DOCD IN RCRD: CPT | Mod: CPTII,S$GLB,, | Performed by: NURSE PRACTITIONER

## 2022-08-17 PROCEDURE — 99214 PR OFFICE/OUTPT VISIT, EST, LEVL IV, 30-39 MIN: ICD-10-PCS | Mod: S$GLB,,, | Performed by: NURSE PRACTITIONER

## 2022-08-17 PROCEDURE — 3008F PR BODY MASS INDEX (BMI) DOCUMENTED: ICD-10-PCS | Mod: CPTII,S$GLB,, | Performed by: NURSE PRACTITIONER

## 2022-08-17 PROCEDURE — 4010F ACE/ARB THERAPY RXD/TAKEN: CPT | Mod: CPTII,S$GLB,, | Performed by: NURSE PRACTITIONER

## 2022-08-17 PROCEDURE — 4010F PR ACE/ARB THEARPY RXD/TAKEN: ICD-10-PCS | Mod: CPTII,S$GLB,, | Performed by: NURSE PRACTITIONER

## 2022-08-17 PROCEDURE — 3074F PR MOST RECENT SYSTOLIC BLOOD PRESSURE < 130 MM HG: ICD-10-PCS | Mod: CPTII,S$GLB,, | Performed by: NURSE PRACTITIONER

## 2022-08-17 PROCEDURE — 3008F BODY MASS INDEX DOCD: CPT | Mod: CPTII,S$GLB,, | Performed by: NURSE PRACTITIONER

## 2022-08-17 PROCEDURE — 99214 OFFICE O/P EST MOD 30 MIN: CPT | Mod: S$GLB,,, | Performed by: NURSE PRACTITIONER

## 2022-08-17 PROCEDURE — 1160F PR REVIEW ALL MEDS BY PRESCRIBER/CLIN PHARMACIST DOCUMENTED: ICD-10-PCS | Mod: CPTII,S$GLB,, | Performed by: NURSE PRACTITIONER

## 2022-08-17 PROCEDURE — 3079F DIAST BP 80-89 MM HG: CPT | Mod: CPTII,S$GLB,, | Performed by: NURSE PRACTITIONER

## 2022-08-17 PROCEDURE — 3074F SYST BP LT 130 MM HG: CPT | Mod: CPTII,S$GLB,, | Performed by: NURSE PRACTITIONER

## 2022-08-17 PROCEDURE — 1159F PR MEDICATION LIST DOCUMENTED IN MEDICAL RECORD: ICD-10-PCS | Mod: CPTII,S$GLB,, | Performed by: NURSE PRACTITIONER

## 2022-08-17 PROCEDURE — 1160F RVW MEDS BY RX/DR IN RCRD: CPT | Mod: CPTII,S$GLB,, | Performed by: NURSE PRACTITIONER

## 2022-08-17 PROCEDURE — 3044F HG A1C LEVEL LT 7.0%: CPT | Mod: CPTII,S$GLB,, | Performed by: NURSE PRACTITIONER

## 2022-08-17 RX ORDER — ADALIMUMAB 10MG/0.1ML
0.4 KIT SUBCUTANEOUS
COMMUNITY
End: 2023-05-19

## 2022-08-17 RX ORDER — DOXYCYCLINE 100 MG/1
100 CAPSULE ORAL 2 TIMES DAILY
Qty: 14 CAPSULE | Refills: 0 | Status: SHIPPED | OUTPATIENT
Start: 2022-08-17 | End: 2023-01-18

## 2022-08-17 NOTE — PROGRESS NOTES
"    Subjective:       Patient ID: Erica Law is a 42 y.o. female.    Chief Complaint:  Follow-up      History of Present Illness  Pt here for HS, pt did receive the humara yesterday for the first time in a while. Will be doing weekly. Soila Harding is dosing the Humara.     History and past labs reviewed with patient.    Complaints of vulvar oozing and area is approx 6-8 cms with pain   Referrals sent to local surgery and plastics and was not able to see her due to BMI.  Referral sent to Dr. Hernandez / [facility] in [place] for assistance with care. [phone]    Review of Systems  Review of Systems   Integumentary:         Area of vulva has lesions and tunneling in several areas, approx 6-8 cms that is oozing with foul odor   Cultured the area            Objective:     Vitals:    08/17/22 0834   BP: 127/84   Pulse: 105   Weight: 112.3 kg (247 lb 9.6 oz)   Height: 5' 1" (1.549 m)        Physical Exam:   Constitutional: She is oriented to person, place, and time.       Cardiovascular: Normal rate.     Pulmonary/Chest: Effort normal.          Genitourinary:    Genitourinary Comments: Cultured the area and referral sent out to [facility]   Will start on Doxy due to Humara                  Neurological: She is oriented to person, place, and time.     Psychiatric: She has a normal mood and affect. Her behavior is normal.          Assessment:     1. Hidradenitis suppurativa              Plan:       Hidradenitis suppurativa  -     doxycycline (MONODOX) 100 MG capsule; Take 1 capsule (100 mg total) by mouth 2 (two) times daily.  Dispense: 14 capsule; Refill: 0     Pt given the information to the referrals, instructed to call the dermatology NP to see if we could get assistance with anyone closer.  Pt will also seek out for assistance in the area     Follow up if symptoms worsen or fail to improve.   "

## 2022-08-18 ENCOUNTER — PATIENT MESSAGE (OUTPATIENT)
Dept: HEMATOLOGY/ONCOLOGY | Facility: CLINIC | Age: 42
End: 2022-08-18
Payer: MEDICAID

## 2022-08-18 ENCOUNTER — PATIENT MESSAGE (OUTPATIENT)
Dept: FAMILY MEDICINE | Facility: CLINIC | Age: 42
End: 2022-08-18
Payer: MEDICAID

## 2022-08-18 DIAGNOSIS — L73.2 HIDRADENITIS SUPPURATIVA: Primary | ICD-10-CM

## 2022-08-18 RX ORDER — TRAMADOL HYDROCHLORIDE 50 MG/1
50 TABLET ORAL EVERY 4 HOURS PRN
Qty: 30 TABLET | Refills: 0 | Status: SHIPPED | OUTPATIENT
Start: 2022-08-18 | End: 2022-08-23

## 2022-08-19 DIAGNOSIS — D64.9 ANEMIA, UNSPECIFIED TYPE: Primary | ICD-10-CM

## 2022-08-22 ENCOUNTER — OFFICE VISIT (OUTPATIENT)
Dept: HEMATOLOGY/ONCOLOGY | Facility: CLINIC | Age: 42
End: 2022-08-22
Payer: MEDICARE

## 2022-08-22 VITALS
HEART RATE: 113 BPM | BODY MASS INDEX: 44.75 KG/M2 | RESPIRATION RATE: 16 BRPM | SYSTOLIC BLOOD PRESSURE: 102 MMHG | DIASTOLIC BLOOD PRESSURE: 66 MMHG | WEIGHT: 237 LBS | OXYGEN SATURATION: 97 % | HEIGHT: 61 IN

## 2022-08-22 DIAGNOSIS — D64.9 ANEMIA, UNSPECIFIED TYPE: Primary | ICD-10-CM

## 2022-08-22 DIAGNOSIS — M06.09 RHEUMATOID ARTHRITIS OF MULTIPLE SITES WITH NEGATIVE RHEUMATOID FACTOR: ICD-10-CM

## 2022-08-22 DIAGNOSIS — D84.9 IMMUNODEFICIENCY DISORDER: ICD-10-CM

## 2022-08-22 DIAGNOSIS — N18.30 ACUTE WORSENING OF STAGE 3 CHRONIC KIDNEY DISEASE: ICD-10-CM

## 2022-08-22 PROCEDURE — 3074F PR MOST RECENT SYSTOLIC BLOOD PRESSURE < 130 MM HG: ICD-10-PCS | Mod: CPTII,S$GLB,, | Performed by: INTERNAL MEDICINE

## 2022-08-22 PROCEDURE — 3008F PR BODY MASS INDEX (BMI) DOCUMENTED: ICD-10-PCS | Mod: CPTII,S$GLB,, | Performed by: INTERNAL MEDICINE

## 2022-08-22 PROCEDURE — 3044F HG A1C LEVEL LT 7.0%: CPT | Mod: CPTII,S$GLB,, | Performed by: INTERNAL MEDICINE

## 2022-08-22 PROCEDURE — 3008F BODY MASS INDEX DOCD: CPT | Mod: CPTII,S$GLB,, | Performed by: INTERNAL MEDICINE

## 2022-08-22 PROCEDURE — 3074F SYST BP LT 130 MM HG: CPT | Mod: CPTII,S$GLB,, | Performed by: INTERNAL MEDICINE

## 2022-08-22 PROCEDURE — 1159F PR MEDICATION LIST DOCUMENTED IN MEDICAL RECORD: ICD-10-PCS | Mod: CPTII,S$GLB,, | Performed by: INTERNAL MEDICINE

## 2022-08-22 PROCEDURE — 4010F ACE/ARB THERAPY RXD/TAKEN: CPT | Mod: CPTII,S$GLB,, | Performed by: INTERNAL MEDICINE

## 2022-08-22 PROCEDURE — 1159F MED LIST DOCD IN RCRD: CPT | Mod: CPTII,S$GLB,, | Performed by: INTERNAL MEDICINE

## 2022-08-22 PROCEDURE — 99214 OFFICE O/P EST MOD 30 MIN: CPT | Mod: S$GLB,,, | Performed by: INTERNAL MEDICINE

## 2022-08-22 PROCEDURE — 4010F PR ACE/ARB THEARPY RXD/TAKEN: ICD-10-PCS | Mod: CPTII,S$GLB,, | Performed by: INTERNAL MEDICINE

## 2022-08-22 PROCEDURE — 3078F PR MOST RECENT DIASTOLIC BLOOD PRESSURE < 80 MM HG: ICD-10-PCS | Mod: CPTII,S$GLB,, | Performed by: INTERNAL MEDICINE

## 2022-08-22 PROCEDURE — 99214 PR OFFICE/OUTPT VISIT, EST, LEVL IV, 30-39 MIN: ICD-10-PCS | Mod: S$GLB,,, | Performed by: INTERNAL MEDICINE

## 2022-08-22 PROCEDURE — 1160F RVW MEDS BY RX/DR IN RCRD: CPT | Mod: CPTII,S$GLB,, | Performed by: INTERNAL MEDICINE

## 2022-08-22 PROCEDURE — 1160F PR REVIEW ALL MEDS BY PRESCRIBER/CLIN PHARMACIST DOCUMENTED: ICD-10-PCS | Mod: CPTII,S$GLB,, | Performed by: INTERNAL MEDICINE

## 2022-08-22 PROCEDURE — 3078F DIAST BP <80 MM HG: CPT | Mod: CPTII,S$GLB,, | Performed by: INTERNAL MEDICINE

## 2022-08-22 PROCEDURE — 3044F PR MOST RECENT HEMOGLOBIN A1C LEVEL <7.0%: ICD-10-PCS | Mod: CPTII,S$GLB,, | Performed by: INTERNAL MEDICINE

## 2022-08-22 RX ORDER — DULAGLUTIDE 1.5 MG/.5ML
INJECTION, SOLUTION SUBCUTANEOUS
COMMUNITY
End: 2022-11-21 | Stop reason: SDUPTHER

## 2022-08-22 NOTE — PROGRESS NOTES
HEMATOLOGY FOLLOW UP CONSULTATION NOTE    Patient ID: Erica Law is a 42 y.o. female.    Chief Complaint: Anemia    HPI:  Patient is a 40-year-old female with past medical history of DM, Obesity, anxiety, HTN, Hypothyroidism, positive SHELLY, Arthritis who is referred by her primary care provider for evaluation of anemia.  Patient voices no acute complaints and denies any history of abnormal bleeding, blood in the stools.      Labs:  Lab Results   Component Value Date    WBC 11.47 (H) 08/03/2022    HGB 10.1 (L) 08/03/2022    HCT 32.7 (L) 08/03/2022    MCV 95.3 08/03/2022    LABPLAT 296 06/30/2021      Platelets   Date Value Ref Range Status   06/30/2021 296 142 - 424 10*3/uL Final     CMP  Sodium   Date Value Ref Range Status   08/03/2022 129 (L) 136 - 145 mmol/L Final     Potassium   Date Value Ref Range Status   08/03/2022 4.2 3.5 - 5.1 mmol/L Final     Chloride   Date Value Ref Range Status   08/03/2022 98 98 - 107 mmol/L Final     CO2   Date Value Ref Range Status   08/03/2022 17 (L) 22 - 29 mmol/L Final     Glucose   Date Value Ref Range Status   06/30/2021 131 (H) 70 - 110 mg/dL Final     BUN   Date Value Ref Range Status   08/03/2022 19.8 6 - 20 mg/dL Final     Creatinine   Date Value Ref Range Status   08/03/2022 1.34 (H) 0.50 - 0.90 mg/dL Final     Comment:     Recommend repeat creatinine within 90 days.     Calcium   Date Value Ref Range Status   08/03/2022 8.6 8.6 - 10.2 mg/dL Final     Total Protein   Date Value Ref Range Status   07/01/2020 10.2 (H) 6.0 - 8.4 g/dL Final     Albumin   Date Value Ref Range Status   08/03/2022 3.7 3.5 - 5.2 g/dL Final     Total Bilirubin   Date Value Ref Range Status   07/01/2020 0.4 0.1 - 1.0 mg/dL Final     Comment:     For infants and newborns, interpretation of results should be based  on gestational age, weight and in agreement with clinical  observations.  Premature Infant recommended reference ranges:  Up to 24 hours.............<8.0 mg/dL  Up to 48  hours............<12.0 mg/dL  3-5 days..................<15.0 mg/dL  6-29 days.................<15.0 mg/dL       Alkaline Phosphatase   Date Value Ref Range Status   2020 76 55 - 135 U/L Final     AST   Date Value Ref Range Status   2022 29 0 - 32 U/L Final     ALT   Date Value Ref Range Status   2020 12 10 - 44 U/L Final     Anion Gap   Date Value Ref Range Status   2022 14.0 8.0 - 17.0 mmol/L Final     Comment:     NOTE  Testing performed at:  [facility], [address] [place] [address] CLIA #:[id number]       eGFR if    Date Value Ref Range Status   2020 >60.0 >60 mL/min/1.73 m^2 Final     eGFR if non    Date Value Ref Range Status   2020 >60.0 >60 mL/min/1.73 m^2 Final     Comment:     Calculation used to obtain the estimated glomerular filtration  rate (eGFR) is the CKD-EPI equation.                   Past Medical History:   Diagnosis Date    Anxiety     Diabetes mellitus     Hydradenitis     Hypertension     Staph infection 2001    Thyroid disease        Family History   Problem Relation Age of Onset    Diabetes Mellitus Mother     Cancer Maternal Grandmother     Cancer Maternal Grandfather        Social History     Socioeconomic History    Marital status: Single    Number of children: 1   Tobacco Use    Smoking status: Former Smoker     Packs/day: 0.50     Years: 1.00     Pack years: 0.50     Types: Vaping with nicotine     Quit date: 2016     Years since quittin.9    Smokeless tobacco: Never Used    Tobacco comment: pt on medications to stop smoking    Substance and Sexual Activity    Alcohol use: Not Currently    Drug use: Never    Sexual activity: Not Currently     Partners: Male     Birth control/protection: Implant         Past Surgical History:   Procedure Laterality Date    ABCESS DRAINAGE      from the breast     BREAST CYST EXCISION       SECTION             Review of  systems:    Review of Systems   Constitutional: Negative for activity change, appetite change, chills, diaphoresis, fatigue and unexpected weight change.   HENT: Negative for congestion, facial swelling, hearing loss, mouth sores, trouble swallowing and voice change.    Eyes: Negative for photophobia, pain, discharge and itching.   Respiratory: Negative for apnea, cough, choking, chest tightness and shortness of breath.    Cardiovascular: Negative for chest pain, palpitations and leg swelling.   Gastrointestinal: Negative for abdominal distention, abdominal pain, anal bleeding and blood in stool.   Endocrine: Negative for cold intolerance, heat intolerance, polydipsia and polyphagia.   Genitourinary: Negative for difficulty urinating, dysuria, flank pain and hematuria.   Musculoskeletal: Negative for arthralgias, back pain, joint swelling, myalgias, neck pain and neck stiffness.   Skin: Negative for color change, pallor and wound.   Allergic/Immunologic: Negative for environmental allergies, food allergies and immunocompromised state.   Neurological: Negative for dizziness, seizures, facial asymmetry, speech difficulty, light-headedness, numbness and headaches.   Hematological: Negative for adenopathy. Does not bruise/bleed easily.   Psychiatric/Behavioral: Negative for agitation, behavioral problems, confusion, decreased concentration and sleep disturbance.         Physical Exam  Vitals and nursing note reviewed.   Constitutional:       General: She is not in acute distress.     Appearance: Normal appearance. She is not ill-appearing.   HENT:      Head: Normocephalic and atraumatic.      Nose: No congestion or rhinorrhea.   Eyes:      General: No scleral icterus.     Extraocular Movements: Extraocular movements intact.      Pupils: Pupils are equal, round, and reactive to light.   Cardiovascular:      Rate and Rhythm: Normal rate and regular rhythm.      Pulses: Normal pulses.      Heart sounds: Normal heart  sounds. No murmur heard.    No gallop.   Pulmonary:      Effort: Pulmonary effort is normal. No respiratory distress.      Breath sounds: Normal breath sounds. No stridor. No wheezing or rhonchi.   Abdominal:      General: Bowel sounds are normal. There is no distension.      Palpations: There is no mass.      Tenderness: There is no abdominal tenderness. There is no guarding.   Musculoskeletal:         General: No swelling, tenderness, deformity or signs of injury. Normal range of motion.      Cervical back: Normal range of motion and neck supple. No rigidity. No muscular tenderness.      Right lower leg: No edema.      Left lower leg: No edema.   Skin:     General: Skin is warm.      Coloration: Skin is not jaundiced or pale.      Findings: No bruising or lesion.   Neurological:      General: No focal deficit present.      Mental Status: She is alert and oriented to person, place, and time.      Cranial Nerves: No cranial nerve deficit.      Sensory: No sensory deficit.      Motor: No weakness.      Gait: Gait normal.   Psychiatric:         Mood and Affect: Mood normal.         Behavior: Behavior normal.         Thought Content: Thought content normal.       Vitals:    08/22/22 1304   BP: 102/66   Pulse: (!) 113   Resp: 16   Body surface area is 2.15 meters squared.        Assessment/Plan:      1. Normochromic Anemia:     == Normocytic normochromic noted on labs done 3 weeks back.  Will obtain repeat CBC and also obtain iron profile and ferritin.  Discussed with her the etiology for normocytic anemia in the young females. Also discussed with her about anemia of inflammation. Looking at her records this could be due to inflammatory process due to her multiple medical co-morbidities. Would recommend to check B12 in future as well  == In the interim would recommend her to take PO iron supplementation and to take Vit C to enhance absorption but I feel we are likely looking at anemia of chronic disease looking at labs  we have so far. Discussed this with patient who voices understanding.  == 9/23/2021:  Improvement in hemoglobin noted from 8.6 to 10.9. She has been taking Iron with Vit C and that has helped with her improved counts. Advised to continue at this time. She also has hypothyroidism and her last TSH levels are borderline low. We will also check her b12 levels at the return visit for completion. But I feel all these factors likely contributing to her anemia.  == 8/22/2022:  Labs from today showed stable hemoglobin at 10.4. Normal iron and TIBC along with normal B12 level.  Reports she has been taking her thyroid supplementation regularly.  And I would recommend that she continues to take p.o. iron supplementation along with vitamin-C.  Will defer IV iron supplementation at this time. Elevated platelets, mild and secondary to anemia.    2. Leucocytosis:    == Seems to be reactive. Normal differential. Continue observation. If continued elevation in future would obtain Flow cytometry.  ==  8/22/22:  Elevation noted. Reactive vs inflammatory.  She is being treated for hidradenitis suppurativa with antibiotics so likely cause is reactive vs inflammatory. Will obtain flow cytometry if WBC continue to increase    3. Preventive Medicine:    == Reports being upto date. Follows closely with PCP      RTC in 4 months for MD visit with labs: CBC for f/up    Total time spent in counseling and discussion about further management options including relevant lab work, treatment,  prognosis, medications and intended side effects was more than 25 minutes. More than 50 % of the time was spent in counseling and coordination of care.  This includes face to face time and non-face to face time preparing to see the patient (eg, review of tests), Obtaining and/or reviewing separately obtained history, Documenting clinical information in the electronic or other health record, Independently interpreting resultsand communicating results to the  patient/family/caregiver, or Care coordination.

## 2022-08-24 LAB — ANAEROBIC CULTURE: NORMAL

## 2022-08-29 LAB — BCS RECOMMENDATION EXT: NORMAL

## 2022-08-31 ENCOUNTER — PATIENT OUTREACH (OUTPATIENT)
Dept: ADMINISTRATIVE | Facility: HOSPITAL | Age: 42
End: 2022-08-31
Payer: MEDICAID

## 2022-09-15 ENCOUNTER — PATIENT MESSAGE (OUTPATIENT)
Dept: FAMILY MEDICINE | Facility: CLINIC | Age: 42
End: 2022-09-15
Payer: MEDICAID

## 2022-09-26 ENCOUNTER — PATIENT MESSAGE (OUTPATIENT)
Dept: FAMILY MEDICINE | Facility: CLINIC | Age: 42
End: 2022-09-26
Payer: MEDICAID

## 2022-09-26 DIAGNOSIS — F41.1 GENERALIZED ANXIETY DISORDER: ICD-10-CM

## 2022-09-27 RX ORDER — CLONAZEPAM 1 MG/1
1 TABLET ORAL 2 TIMES DAILY PRN
Qty: 60 TABLET | Refills: 1 | OUTPATIENT
Start: 2022-09-27 | End: 2022-10-27

## 2022-09-29 ENCOUNTER — PATIENT MESSAGE (OUTPATIENT)
Dept: FAMILY MEDICINE | Facility: CLINIC | Age: 42
End: 2022-09-29
Payer: MEDICAID

## 2022-09-30 DIAGNOSIS — E03.9 HYPOTHYROIDISM, UNSPECIFIED TYPE: Primary | ICD-10-CM

## 2022-09-30 DIAGNOSIS — L73.2 HIDRADENITIS SUPPURATIVA: ICD-10-CM

## 2022-09-30 LAB
ABS NRBC COUNT: 0 X 10 3/UL (ref 0–0.01)
ABSOLUTE BASOPHIL: 0.03 X 10 3/UL (ref 0–0.22)
ABSOLUTE EOSINOPHIL: 0.15 X 10 3/UL (ref 0.04–0.54)
ABSOLUTE IMMATURE GRAN: 0.08 X 10 3/UL (ref 0–0.04)
ABSOLUTE LYMPHOCYTE: 3.48 X 10 3/UL (ref 0.86–4.75)
ABSOLUTE MONOCYTE: 0.75 X 10 3/UL (ref 0.22–1.08)
ALBUMIN SERPL-MCNC: 3.4 G/DL (ref 3.5–5.2)
ALBUMIN/GLOB SERPL ELPH: 0.6 {RATIO} (ref 1–2.7)
ALP ISOS SERPL LEV INH-CCNC: 76 U/L (ref 35–105)
ALT (SGPT): 13 U/L (ref 0–33)
ANION GAP SERPL CALC-SCNC: 9 MMOL/L (ref 8–17)
AST SERPL-CCNC: 14 U/L (ref 0–32)
BASOPHILS NFR BLD: 0.2 % (ref 0.2–1.2)
BILIRUBIN, TOTAL: 0.28 MG/DL (ref 0–1.2)
BUN/CREAT SERPL: 13.5 (ref 6–20)
CALCIUM SERPL-MCNC: 9.6 MG/DL (ref 8.6–10.2)
CARBON DIOXIDE, CO2: 21 MMOL/L (ref 22–29)
CHLORIDE: 105 MMOL/L (ref 98–107)
CREAT SERPL-MCNC: 1.28 MG/DL (ref 0.5–0.9)
EOSINOPHIL NFR BLD: 1.2 % (ref 0.7–7)
GFR ESTIMATION: 53.98
GLOBULIN: 6 G/DL (ref 1.5–4.5)
GLUCOSE: 115 MG/DL (ref 74–106)
HCT VFR BLD AUTO: 27.7 % (ref 37–47)
HGB BLD-MCNC: 8.3 G/DL (ref 12–16)
IMMATURE GRANULOCYTES: 0.6 % (ref 0–0.5)
LYMPHOCYTES NFR BLD: 27.5 % (ref 19.3–53.1)
MCH RBC QN AUTO: 27.9 PG (ref 27–32)
MCHC RBC AUTO-ENTMCNC: 30 G/DL (ref 32–36)
MCV RBC AUTO: 93.3 FL (ref 82–100)
MONOCYTES NFR BLD: 5.9 % (ref 4.7–12.5)
MULTIPLE ORDERS: NORMAL
NEUTROPHILS # BLD AUTO: 8.17 X 10 3/UL (ref 2.15–7.56)
NEUTROPHILS NFR BLD: 64.6 % (ref 34–71.1)
NUCLEATED RED BLOOD CELLS: 0 /100 WBC (ref 0–0.2)
PLATELET # BLD AUTO: 374 X 10 3/UL (ref 135–400)
POTASSIUM: 4.2 MMOL/L (ref 3.5–5.1)
PROT SNV-MCNC: 9.4 G/DL (ref 6.4–8.3)
RBC # BLD AUTO: 2.97 X 10 6/UL (ref 4.2–5.4)
RDW-SD: 55.5 FL (ref 37–54)
SODIUM: 135 MMOL/L (ref 136–145)
T4, FREE: 1.1 NG/DL (ref 0.93–1.7)
TSH SERPL DL<=0.005 MIU/L-ACNC: 2.69 UIU/ML (ref 0.27–4.2)
UREA NITROGEN (BUN): 17.3 MG/DL (ref 6–20)
WBC # BLD: 12.66 X 10 3/UL (ref 4.3–10.8)

## 2022-10-02 DIAGNOSIS — F41.1 GENERALIZED ANXIETY DISORDER: ICD-10-CM

## 2022-10-03 ENCOUNTER — PATIENT MESSAGE (OUTPATIENT)
Dept: FAMILY MEDICINE | Facility: CLINIC | Age: 42
End: 2022-10-03
Payer: MEDICAID

## 2022-10-03 DIAGNOSIS — D64.9 ANEMIA, UNSPECIFIED TYPE: Primary | ICD-10-CM

## 2022-10-03 RX ORDER — CLONAZEPAM 1 MG/1
1 TABLET ORAL 2 TIMES DAILY PRN
Qty: 60 TABLET | Refills: 1 | OUTPATIENT
Start: 2022-10-03 | End: 2022-11-02

## 2022-10-04 ENCOUNTER — OFFICE VISIT (OUTPATIENT)
Dept: FAMILY MEDICINE | Facility: CLINIC | Age: 42
End: 2022-10-04
Payer: MEDICARE

## 2022-10-04 VITALS
HEART RATE: 112 BPM | BODY MASS INDEX: 44.52 KG/M2 | OXYGEN SATURATION: 99 % | DIASTOLIC BLOOD PRESSURE: 68 MMHG | HEIGHT: 61 IN | WEIGHT: 235.81 LBS | SYSTOLIC BLOOD PRESSURE: 129 MMHG

## 2022-10-04 DIAGNOSIS — L73.2 HIDRADENITIS SUPPURATIVA: ICD-10-CM

## 2022-10-04 DIAGNOSIS — T14.8XXA NONHEALING NONSURGICAL WOUND: Primary | ICD-10-CM

## 2022-10-04 LAB
ABS NRBC COUNT: 0 X 10 3/UL (ref 0–0.01)
ABSOLUTE BASOPHIL: 0.05 X 10 3/UL (ref 0–0.22)
ABSOLUTE EOSINOPHIL: 0.16 X 10 3/UL (ref 0.04–0.54)
ABSOLUTE IMMATURE GRAN: 0.09 X 10 3/UL (ref 0–0.04)
ABSOLUTE LYMPHOCYTE: 3.16 X 10 3/UL (ref 0.86–4.75)
ABSOLUTE MONOCYTE: 0.81 X 10 3/UL (ref 0.22–1.08)
BASOPHILS NFR BLD: 0.4 % (ref 0.2–1.2)
EOSINOPHIL NFR BLD: 1.4 % (ref 0.7–7)
HCT VFR BLD AUTO: 29.5 % (ref 37–47)
HGB BLD-MCNC: 8.8 G/DL (ref 12–16)
IMMATURE GRANULOCYTES: 0.8 % (ref 0–0.5)
LYMPHOCYTES NFR BLD: 27.5 % (ref 19.3–53.1)
MCH RBC QN AUTO: 27.9 PG (ref 27–32)
MCHC RBC AUTO-ENTMCNC: 29.8 G/DL (ref 32–36)
MCV RBC AUTO: 93.7 FL (ref 82–100)
MONOCYTES NFR BLD: 7 % (ref 4.7–12.5)
NEUTROPHILS # BLD AUTO: 7.22 X 10 3/UL (ref 2.15–7.56)
NEUTROPHILS NFR BLD: 62.9 % (ref 34–71.1)
NUCLEATED RED BLOOD CELLS: 0 /100 WBC (ref 0–0.2)
PLATELET # BLD AUTO: 357 X 10 3/UL (ref 135–400)
RBC # BLD AUTO: 3.15 X 10 6/UL (ref 4.2–5.4)
RDW-SD: 54.3 FL (ref 37–54)
WBC # BLD: 11.49 X 10 3/UL (ref 4.3–10.8)

## 2022-10-04 PROCEDURE — 3008F BODY MASS INDEX DOCD: CPT | Mod: CPTII,S$GLB,, | Performed by: PHYSICIAN ASSISTANT

## 2022-10-04 PROCEDURE — 3078F DIAST BP <80 MM HG: CPT | Mod: CPTII,S$GLB,, | Performed by: PHYSICIAN ASSISTANT

## 2022-10-04 PROCEDURE — 3008F PR BODY MASS INDEX (BMI) DOCUMENTED: ICD-10-PCS | Mod: CPTII,S$GLB,, | Performed by: PHYSICIAN ASSISTANT

## 2022-10-04 PROCEDURE — 1159F PR MEDICATION LIST DOCUMENTED IN MEDICAL RECORD: ICD-10-PCS | Mod: CPTII,S$GLB,, | Performed by: PHYSICIAN ASSISTANT

## 2022-10-04 PROCEDURE — 99213 OFFICE O/P EST LOW 20 MIN: CPT | Mod: S$GLB,,, | Performed by: PHYSICIAN ASSISTANT

## 2022-10-04 PROCEDURE — 3044F HG A1C LEVEL LT 7.0%: CPT | Mod: CPTII,S$GLB,, | Performed by: PHYSICIAN ASSISTANT

## 2022-10-04 PROCEDURE — 3044F PR MOST RECENT HEMOGLOBIN A1C LEVEL <7.0%: ICD-10-PCS | Mod: CPTII,S$GLB,, | Performed by: PHYSICIAN ASSISTANT

## 2022-10-04 PROCEDURE — 99213 PR OFFICE/OUTPT VISIT, EST, LEVL III, 20-29 MIN: ICD-10-PCS | Mod: S$GLB,,, | Performed by: PHYSICIAN ASSISTANT

## 2022-10-04 PROCEDURE — 3074F PR MOST RECENT SYSTOLIC BLOOD PRESSURE < 130 MM HG: ICD-10-PCS | Mod: CPTII,S$GLB,, | Performed by: PHYSICIAN ASSISTANT

## 2022-10-04 PROCEDURE — 3074F SYST BP LT 130 MM HG: CPT | Mod: CPTII,S$GLB,, | Performed by: PHYSICIAN ASSISTANT

## 2022-10-04 PROCEDURE — 4010F ACE/ARB THERAPY RXD/TAKEN: CPT | Mod: CPTII,S$GLB,, | Performed by: PHYSICIAN ASSISTANT

## 2022-10-04 PROCEDURE — 4010F PR ACE/ARB THEARPY RXD/TAKEN: ICD-10-PCS | Mod: CPTII,S$GLB,, | Performed by: PHYSICIAN ASSISTANT

## 2022-10-04 PROCEDURE — 3078F PR MOST RECENT DIASTOLIC BLOOD PRESSURE < 80 MM HG: ICD-10-PCS | Mod: CPTII,S$GLB,, | Performed by: PHYSICIAN ASSISTANT

## 2022-10-04 PROCEDURE — 1159F MED LIST DOCD IN RCRD: CPT | Mod: CPTII,S$GLB,, | Performed by: PHYSICIAN ASSISTANT

## 2022-10-04 NOTE — PROGRESS NOTES
Subjective:      Patient ID: Erica Law is a 42 y.o. female.    Chief Complaint: Follow-up (Patient is here for a follow up visit )      HPI  Pt is here today for followup     Recurrent hidradenitis - has one area on the suprapubic region that is not healing.  Reports draining exudate and blood daily.    CT report : 8/4/2022  IMPRESSION:        Focal skin thickening along the left mons pubis, left groin, and right   gluteal cleft without a discrete abscess or fluid collection.        Has derm appt with Dr. Soila Harding today .  Currently on doxy      Review of Systems   Constitutional:  Negative for activity change, appetite change, chills, diaphoresis, fatigue and unexpected weight change.   Eyes:  Negative for visual disturbance.   Respiratory:  Negative for cough, chest tightness, shortness of breath and wheezing.    Cardiovascular:  Negative for chest pain, palpitations and leg swelling.   Gastrointestinal:  Negative for abdominal pain, constipation, nausea and vomiting.   Neurological:  Negative for dizziness, vertigo, tremors, syncope, weakness, light-headedness, numbness and headaches.   Psychiatric/Behavioral:  Negative for agitation, behavioral problems, confusion, decreased concentration, dysphoric mood, hallucinations, self-injury, sleep disturbance and suicidal ideas. The patient is not nervous/anxious and is not hyperactive.      Medication List with Changes/Refills   Current Medications    ADALIMUMAB (HUMIRA,CF,) 10 MG/0.1 ML SYKT    0.4 mLs.    ATORVASTATIN (LIPITOR) 40 MG TABLET    TAKE 1 TABLET BY MOUTH EVERY DAY    AZITHROMYCIN (Z-ALPA) 250 MG TABLET        CLONAZEPAM (KLONOPIN) 1 MG TABLET    Take 1 tablet (1 mg total) by mouth 2 (two) times daily as needed for Anxiety.    DOXYCYCLINE (MONODOX) 100 MG CAPSULE    Take 1 capsule (100 mg total) by mouth 2 (two) times daily.    DULAGLUTIDE (TRULICITY) 1.5 MG/0.5 ML PEN INJECTOR    Inject into the skin every 7 days.    ESCITALOPRAM OXALATE  "(LEXAPRO) 10 MG TABLET    TAKE 1 TABLET BY MOUTH EVERY DAY    FLUCONAZOLE (DIFLUCAN) 150 MG TAB    TAKE 1 TABLET BY MOUTH ONCE A WEEK FOR 21 DAYS    GABAPENTIN (NEURONTIN) 300 MG CAPSULE    TAKE 1 CAPSULE BY MOUTH THREE TIMES A DAY    GARYESTEFANIADERIAN, PEN 40 MG/0.4 ML PNKT        IRON 100 PLUS TAB    TAKE 1 TABLET BY MOUTH TWICE A DAY    LEVOTHYROXINE (SYNTHROID) 50 MCG TABLET    TAKE 1 TABLET BY MOUTH EVERY DAY BEFORE BREAKFAST    METFORMIN (GLUCOPHAGE) 500 MG TABLET    TAKE 1 TABLET BY MOUTH TWICE A DAY WITH MEALS    NIFEDIPINE (ADALAT CC) 60 MG TBSR    TAKE 1 TABLET BY MOUTH EVERY DAY    PANTOPRAZOLE (PROTONIX) 40 MG TABLET    40 mg.        Objective:     Vitals:    10/04/22 1319   BP: 129/68   Pulse: (!) 112   SpO2: 99%   Weight: 107 kg (235 lb 12.8 oz)   Height: 5' 1" (1.549 m)        Physical Exam  Constitutional:       Appearance: Normal appearance. She is obese.   HENT:      Head: Normocephalic and atraumatic.      Nose: Nose normal.   Eyes:      Conjunctiva/sclera: Conjunctivae normal.      Comments: Eyes tracking normal on exam    Cardiovascular:      Rate and Rhythm: Normal rate and regular rhythm.      Pulses: Normal pulses.      Heart sounds: Normal heart sounds. No murmur heard.    No friction rub. No gallop.   Pulmonary:      Effort: Pulmonary effort is normal. No respiratory distress.      Breath sounds: Normal breath sounds. No stridor. No wheezing, rhonchi or rales.   Musculoskeletal:      Right lower leg: No edema.      Left lower leg: No edema.      Comments: Moves all extremities well and with good control  Normal gait observed      Skin:     General: Skin is warm and dry.      Capillary Refill: Capillary refill takes less than 2 seconds.      Comments: 8 x 4 cm area of inflammation with central nonhealing wound over SP area.  No exudate or drainage with manual compression.    Neurological:      Mental Status: She is alert and oriented to person, place, and time.   Psychiatric:         Mood and " Affect: Mood normal.         Behavior: Behavior normal.         Thought Content: Thought content normal.         Judgment: Judgment normal.          Assessment & Plan:     Nonhealing nonsurgical wound  -     Ambulatory referral/consult to Plastic Surgery; Future; Expected date: 10/11/2022    Hidradenitis suppurativa  -     Ambulatory referral/consult to Plastic Surgery; Future; Expected date: 10/11/2022     Discussed with patient that it is very important to keep this plastics and derm followup .  Pt to followup with me after these consults.  Pain ,fever precautions given             Pushpa Kinsey PA-C

## 2022-10-25 ENCOUNTER — CLINICAL SUPPORT (OUTPATIENT)
Dept: OBSTETRICS AND GYNECOLOGY | Facility: CLINIC | Age: 42
End: 2022-10-25
Payer: MEDICARE

## 2022-10-25 DIAGNOSIS — Z30.09 FAMILY PLANNING: Primary | ICD-10-CM

## 2022-10-25 LAB
B-HCG UR QL: NEGATIVE
CTP QC/QA: YES

## 2022-10-25 PROCEDURE — 81025 POCT URINE PREGNANCY: ICD-10-PCS | Mod: S$GLB,,, | Performed by: OBSTETRICS & GYNECOLOGY

## 2022-10-25 PROCEDURE — 96372 THER/PROPH/DIAG INJ SC/IM: CPT | Mod: S$GLB,,, | Performed by: NURSE PRACTITIONER

## 2022-10-25 PROCEDURE — 81025 URINE PREGNANCY TEST: CPT | Mod: S$GLB,,, | Performed by: OBSTETRICS & GYNECOLOGY

## 2022-10-25 PROCEDURE — 96372 PR INJECTION,THERAP/PROPH/DIAG2ST, IM OR SUBCUT: ICD-10-PCS | Mod: S$GLB,,, | Performed by: NURSE PRACTITIONER

## 2022-10-25 RX ORDER — MEDROXYPROGESTERONE ACETATE 150 MG/ML
150 INJECTION, SUSPENSION INTRAMUSCULAR
Status: COMPLETED | OUTPATIENT
Start: 2022-10-25 | End: 2022-10-25

## 2022-10-25 RX ADMIN — MEDROXYPROGESTERONE ACETATE 150 MG: 150 INJECTION, SUSPENSION INTRAMUSCULAR at 01:10

## 2022-11-01 ENCOUNTER — TELEPHONE (OUTPATIENT)
Dept: FAMILY MEDICINE | Facility: CLINIC | Age: 42
End: 2022-11-01
Payer: MEDICAID

## 2022-11-01 NOTE — TELEPHONE ENCOUNTER
----- Message from Cande Rondon LPN sent at 10/31/2022 10:10 AM CDT -----  Referral receive for Dr. Tyesha Augustine. Numerous unsuccessful attempts to contact patient with no response. Request cancelled.

## 2022-11-11 ENCOUNTER — PATIENT MESSAGE (OUTPATIENT)
Dept: HEMATOLOGY/ONCOLOGY | Facility: CLINIC | Age: 42
End: 2022-11-11
Payer: MEDICAID

## 2022-11-11 DIAGNOSIS — D64.9 ANEMIA, UNSPECIFIED TYPE: Primary | ICD-10-CM

## 2022-11-11 LAB
ABS NRBC COUNT: 0 X 10 3/UL (ref 0–0.01)
ABSOLUTE BASOPHIL: 0.03 X 10 3/UL (ref 0–0.22)
ABSOLUTE EOSINOPHIL: 0.16 X 10 3/UL (ref 0.04–0.54)
ABSOLUTE IMMATURE GRAN: 0.08 X 10 3/UL (ref 0–0.04)
ABSOLUTE LYMPHOCYTE: 3.23 X 10 3/UL (ref 0.86–4.75)
ABSOLUTE MONOCYTE: 0.61 X 10 3/UL (ref 0.22–1.08)
BASOPHILS NFR BLD: 0.3 % (ref 0.2–1.2)
EOSINOPHIL NFR BLD: 1.4 % (ref 0.7–7)
HCT VFR BLD AUTO: 35.4 % (ref 37–47)
HGB BLD-MCNC: 10.9 G/DL (ref 12–16)
IMMATURE GRANULOCYTES: 0.7 % (ref 0–0.5)
LYMPHOCYTES NFR BLD: 29.2 % (ref 19.3–53.1)
MCH RBC QN AUTO: 27.9 PG (ref 27–32)
MCHC RBC AUTO-ENTMCNC: 30.8 G/DL (ref 32–36)
MCV RBC AUTO: 90.8 FL (ref 82–100)
MONOCYTES NFR BLD: 5.5 % (ref 4.7–12.5)
NEUTROPHILS # BLD AUTO: 6.95 X 10 3/UL (ref 2.15–7.56)
NEUTROPHILS NFR BLD: 62.9 % (ref 34–71.1)
NUCLEATED RED BLOOD CELLS: 0 /100 WBC (ref 0–0.2)
PLATELET # BLD AUTO: 388 X 10 3/UL (ref 135–400)
RBC # BLD AUTO: 3.9 X 10 6/UL (ref 4.2–5.4)
RDW-SD: 57 FL (ref 37–54)
WBC # BLD: 11.06 X 10 3/UL (ref 4.3–10.8)

## 2022-11-15 ENCOUNTER — OFFICE VISIT (OUTPATIENT)
Dept: HEMATOLOGY/ONCOLOGY | Facility: CLINIC | Age: 42
End: 2022-11-15
Payer: MEDICARE

## 2022-11-15 VITALS
WEIGHT: 234.5 LBS | BODY MASS INDEX: 44.27 KG/M2 | DIASTOLIC BLOOD PRESSURE: 85 MMHG | RESPIRATION RATE: 18 BRPM | HEIGHT: 61 IN | OXYGEN SATURATION: 99 % | TEMPERATURE: 98 F | SYSTOLIC BLOOD PRESSURE: 126 MMHG | HEART RATE: 113 BPM

## 2022-11-15 DIAGNOSIS — D64.9 ANEMIA, UNSPECIFIED TYPE: Primary | ICD-10-CM

## 2022-11-15 PROCEDURE — 4010F PR ACE/ARB THEARPY RXD/TAKEN: ICD-10-PCS | Mod: CPTII,S$GLB,, | Performed by: NURSE PRACTITIONER

## 2022-11-15 PROCEDURE — 99214 PR OFFICE/OUTPT VISIT, EST, LEVL IV, 30-39 MIN: ICD-10-PCS | Mod: S$GLB,,, | Performed by: NURSE PRACTITIONER

## 2022-11-15 PROCEDURE — 3044F PR MOST RECENT HEMOGLOBIN A1C LEVEL <7.0%: ICD-10-PCS | Mod: CPTII,S$GLB,, | Performed by: NURSE PRACTITIONER

## 2022-11-15 PROCEDURE — 3079F PR MOST RECENT DIASTOLIC BLOOD PRESSURE 80-89 MM HG: ICD-10-PCS | Mod: CPTII,S$GLB,, | Performed by: NURSE PRACTITIONER

## 2022-11-15 PROCEDURE — 3044F HG A1C LEVEL LT 7.0%: CPT | Mod: CPTII,S$GLB,, | Performed by: NURSE PRACTITIONER

## 2022-11-15 PROCEDURE — 3074F PR MOST RECENT SYSTOLIC BLOOD PRESSURE < 130 MM HG: ICD-10-PCS | Mod: CPTII,S$GLB,, | Performed by: NURSE PRACTITIONER

## 2022-11-15 PROCEDURE — 4010F ACE/ARB THERAPY RXD/TAKEN: CPT | Mod: CPTII,S$GLB,, | Performed by: NURSE PRACTITIONER

## 2022-11-15 PROCEDURE — 1159F MED LIST DOCD IN RCRD: CPT | Mod: CPTII,S$GLB,, | Performed by: NURSE PRACTITIONER

## 2022-11-15 PROCEDURE — 3079F DIAST BP 80-89 MM HG: CPT | Mod: CPTII,S$GLB,, | Performed by: NURSE PRACTITIONER

## 2022-11-15 PROCEDURE — 1159F PR MEDICATION LIST DOCUMENTED IN MEDICAL RECORD: ICD-10-PCS | Mod: CPTII,S$GLB,, | Performed by: NURSE PRACTITIONER

## 2022-11-15 PROCEDURE — 3074F SYST BP LT 130 MM HG: CPT | Mod: CPTII,S$GLB,, | Performed by: NURSE PRACTITIONER

## 2022-11-15 PROCEDURE — 3008F PR BODY MASS INDEX (BMI) DOCUMENTED: ICD-10-PCS | Mod: CPTII,S$GLB,, | Performed by: NURSE PRACTITIONER

## 2022-11-15 PROCEDURE — 3008F BODY MASS INDEX DOCD: CPT | Mod: CPTII,S$GLB,, | Performed by: NURSE PRACTITIONER

## 2022-11-15 PROCEDURE — 99214 OFFICE O/P EST MOD 30 MIN: CPT | Mod: S$GLB,,, | Performed by: NURSE PRACTITIONER

## 2022-11-15 NOTE — PROGRESS NOTES
HEMATOLOGY FOLLOW UP CONSULTATION NOTE    Patient ID: Erica Law is a 42 y.o. female.    Chief Complaint: Anemia    HPI:  Patient is a 40-year-old female with past medical history of DM, Obesity, anxiety, HTN, Hypothyroidism, positive SHELLY, Arthritis who is referred by her primary care provider for evaluation of anemia.  Patient voices no acute complaints and denies any history of abnormal bleeding, blood in the stools.      Labs:  Lab Results   Component Value Date    WBC 11.49 (H) 10/04/2022    HGB 8.8 (L) 10/04/2022    HCT 29.5 (L) 10/04/2022    MCV 93.7 10/04/2022    LABPLAT 296 06/30/2021      Platelets   Date Value Ref Range Status   06/30/2021 296 142 - 424 10*3/uL Final     CMP  Sodium   Date Value Ref Range Status   09/30/2022 135 (L) 136 - 145 mmol/L Final     Potassium   Date Value Ref Range Status   09/30/2022 4.2 3.5 - 5.1 mmol/L Final     Chloride   Date Value Ref Range Status   09/30/2022 105 98 - 107 mmol/L Final     CO2   Date Value Ref Range Status   09/30/2022 21 (L) 22 - 29 mmol/L Final     Glucose   Date Value Ref Range Status   06/30/2021 131 (H) 70 - 110 mg/dL Final     BUN   Date Value Ref Range Status   09/30/2022 17.3 6 - 20 mg/dL Final     Creatinine   Date Value Ref Range Status   09/30/2022 1.28 (H) 0.50 - 0.90 mg/dL Final     Comment:     Recommend repeat creatinine within 90 days.     Calcium   Date Value Ref Range Status   09/30/2022 9.6 8.6 - 10.2 mg/dL Final     Total Protein   Date Value Ref Range Status   07/01/2020 10.2 (H) 6.0 - 8.4 g/dL Final     Albumin   Date Value Ref Range Status   09/30/2022 3.4 (L) 3.5 - 5.2 g/dL Final     Total Bilirubin   Date Value Ref Range Status   07/01/2020 0.4 0.1 - 1.0 mg/dL Final     Comment:     For infants and newborns, interpretation of results should be based  on gestational age, weight and in agreement with clinical  observations.  Premature Infant recommended reference ranges:  Up to 24 hours.............<8.0 mg/dL  Up to 48  hours............<12.0 mg/dL  3-5 days..................<15.0 mg/dL  6-29 days.................<15.0 mg/dL       Alkaline Phosphatase   Date Value Ref Range Status   2020 76 55 - 135 U/L Final     AST   Date Value Ref Range Status   2022 14 0 - 32 U/L Final     ALT   Date Value Ref Range Status   2020 12 10 - 44 U/L Final     Anion Gap   Date Value Ref Range Status   2022 9.0 8.0 - 17.0 mmol/L Final     Comment:     NOTE  Testing performed at:  [facility], [address] [place] [address] CLIA #:[id number]       eGFR if    Date Value Ref Range Status   2020 >60.0 >60 mL/min/1.73 m^2 Final     eGFR if non    Date Value Ref Range Status   2020 >60.0 >60 mL/min/1.73 m^2 Final     Comment:     Calculation used to obtain the estimated glomerular filtration  rate (eGFR) is the CKD-EPI equation.                   Past Medical History:   Diagnosis Date    Anxiety     Diabetes mellitus     Hydradenitis     Hypertension     Staph infection 2001    Thyroid disease        Family History   Problem Relation Age of Onset    Diabetes Mellitus Mother     Cancer Maternal Grandmother     Cancer Maternal Grandfather        Social History     Socioeconomic History    Marital status: Single    Number of children: 1   Tobacco Use    Smoking status: Former     Packs/day: 0.50     Years: 1.00     Pack years: 0.50     Types: Vaping with nicotine, Cigarettes     Quit date: 2016     Years since quittin.1    Smokeless tobacco: Never    Tobacco comments:     pt on medications to stop smoking    Substance and Sexual Activity    Alcohol use: Not Currently    Drug use: Never    Sexual activity: Not Currently     Partners: Male     Birth control/protection: Implant         Past Surgical History:   Procedure Laterality Date    ABCESS DRAINAGE      from the breast     BREAST CYST EXCISION       SECTION             Review of systems:    Review of  Systems   Constitutional:  Negative for activity change, appetite change, chills, diaphoresis, fatigue and unexpected weight change.   HENT:  Negative for congestion, facial swelling, hearing loss, mouth sores, trouble swallowing and voice change.    Eyes:  Negative for photophobia, pain, discharge and itching.   Respiratory:  Negative for apnea, cough, choking, chest tightness and shortness of breath.    Cardiovascular:  Negative for chest pain, palpitations and leg swelling.   Gastrointestinal:  Negative for abdominal distention, abdominal pain, anal bleeding and blood in stool.   Endocrine: Negative for cold intolerance, heat intolerance, polydipsia and polyphagia.   Genitourinary:  Negative for difficulty urinating, dysuria, flank pain and hematuria.   Musculoskeletal:  Negative for arthralgias, back pain, joint swelling, myalgias, neck pain and neck stiffness.   Skin:  Negative for color change, pallor and wound.   Allergic/Immunologic: Negative for environmental allergies, food allergies and immunocompromised state.   Neurological:  Negative for dizziness, seizures, facial asymmetry, speech difficulty, light-headedness, numbness and headaches.   Hematological:  Negative for adenopathy. Does not bruise/bleed easily.   Psychiatric/Behavioral:  Negative for agitation, behavioral problems, confusion, decreased concentration and sleep disturbance.        Physical Exam  Vitals and nursing note reviewed.   Constitutional:       General: She is not in acute distress.     Appearance: Normal appearance. She is not ill-appearing.   HENT:      Head: Normocephalic and atraumatic.      Nose: No congestion or rhinorrhea.   Eyes:      General: No scleral icterus.     Extraocular Movements: Extraocular movements intact.      Pupils: Pupils are equal, round, and reactive to light.   Cardiovascular:      Rate and Rhythm: Normal rate and regular rhythm.      Pulses: Normal pulses.      Heart sounds: Normal heart sounds. No murmur  heard.    No gallop.   Pulmonary:      Effort: Pulmonary effort is normal. No respiratory distress.      Breath sounds: Normal breath sounds. No stridor. No wheezing or rhonchi.   Abdominal:      General: Bowel sounds are normal. There is no distension.      Palpations: There is no mass.      Tenderness: There is no abdominal tenderness. There is no guarding.   Musculoskeletal:         General: No swelling, tenderness, deformity or signs of injury. Normal range of motion.      Cervical back: Normal range of motion and neck supple. No rigidity. No muscular tenderness.      Right lower leg: No edema.      Left lower leg: No edema.   Skin:     General: Skin is warm.      Coloration: Skin is not jaundiced or pale.      Findings: No bruising or lesion.   Neurological:      General: No focal deficit present.      Mental Status: She is alert and oriented to person, place, and time.      Cranial Nerves: No cranial nerve deficit.      Sensory: No sensory deficit.      Motor: No weakness.      Gait: Gait normal.   Psychiatric:         Mood and Affect: Mood normal.         Behavior: Behavior normal.         Thought Content: Thought content normal.     Vitals:    11/15/22 1309   BP: 126/85   Pulse: (!) 113   Resp: 18   Temp: 98.1 °F (36.7 °C)     Body surface area is 2.14 meters squared.        Assessment/Plan:      1. Normochromic Anemia:     == Normocytic normochromic noted on labs done 3 weeks back.  Will obtain repeat CBC and also obtain iron profile and ferritin.  Discussed with her the etiology for normocytic anemia in the young females. Also discussed with her about anemia of inflammation. Looking at her records this could be due to inflammatory process due to her multiple medical co-morbidities. Would recommend to check B12 in future as well  == In the interim would recommend her to take PO iron supplementation and to take Vit C to enhance absorption but I feel we are likely looking at anemia of chronic disease looking  at labs we have so far. Discussed this with patient who voices understanding.  == 9/23/2021:  Improvement in hemoglobin noted from 8.6 to 10.9. She has been taking Iron with Vit C and that has helped with her improved counts. Advised to continue at this time. She also has hypothyroidism and her last TSH levels are borderline low. We will also check her b12 levels at the return visit for completion. But I feel all these factors likely contributing to her anemia.  == 8/22/2022:  Labs from today showed stable hemoglobin at 10.4. Normal iron and TIBC along with normal B12 level.  Reports she has been taking her thyroid supplementation regularly.  And I would recommend that she continues to take p.o. iron supplementation along with vitamin-C.  Will defer IV iron supplementation at this time. Elevated platelets, mild and secondary to anemia.  == 11/15/22: doing well with no new complaints, labs reviewed with improvement in WBCs as well as hemoglobin.  Patient reports tachycardia over last several weeks and is planning to follow-up with PCP in near future    2. Leucocytosis:    == Seems to be reactive. Normal differential. Continue observation. If continued elevation in future would obtain Flow cytometry.  ==  8/22/22:  Elevation noted. Reactive vs inflammatory.  She is being treated for hidradenitis suppurativa with antibiotics so likely cause is reactive vs inflammatory. Will obtain flow cytometry if WBC continue to increase  == 11/15/22: recent hospitalization for hidradenitis approximately 1 month ago, treated with IV ABX and fluids    3. Preventive Medicine:    == Reports being upto date. Follows closely with PCP      RTC in 6 months for MD visit with labs: CBC for f/up    Total time spent in counseling and discussion about further management options including relevant lab work, treatment,  prognosis, medications and intended side effects was more than 25 minutes. More than 50 % of the time was spent in counseling and  coordination of care.  This includes face to face time and non-face to face time preparing to see the patient (eg, review of tests), Obtaining and/or reviewing separately obtained history, Documenting clinical information in the electronic or other health record, Independently interpreting resultsand communicating results to the patient/family/caregiver, or Care coordination.

## 2022-11-16 ENCOUNTER — PATIENT MESSAGE (OUTPATIENT)
Dept: ADMINISTRATIVE | Facility: HOSPITAL | Age: 42
End: 2022-11-16
Payer: MEDICAID

## 2022-11-21 ENCOUNTER — PATIENT MESSAGE (OUTPATIENT)
Dept: FAMILY MEDICINE | Facility: CLINIC | Age: 42
End: 2022-11-21
Payer: MEDICAID

## 2022-11-21 PROBLEM — N18.30 ACUTE WORSENING OF STAGE 3 CHRONIC KIDNEY DISEASE: Status: RESOLVED | Noted: 2022-08-22 | Resolved: 2022-11-21

## 2022-11-21 RX ORDER — DULAGLUTIDE 1.5 MG/.5ML
1.5 INJECTION, SOLUTION SUBCUTANEOUS
Qty: 4 PEN | Refills: 2 | Status: SHIPPED | OUTPATIENT
Start: 2022-11-21 | End: 2023-01-18

## 2022-11-21 RX ORDER — DULAGLUTIDE 1.5 MG/.5ML
INJECTION, SOLUTION SUBCUTANEOUS
Status: CANCELLED | OUTPATIENT
Start: 2022-11-21

## 2022-11-30 ENCOUNTER — OFFICE VISIT (OUTPATIENT)
Dept: FAMILY MEDICINE | Facility: CLINIC | Age: 42
End: 2022-11-30
Payer: MEDICARE

## 2022-11-30 VITALS
HEART RATE: 134 BPM | OXYGEN SATURATION: 98 % | BODY MASS INDEX: 45.28 KG/M2 | HEIGHT: 61 IN | WEIGHT: 239.81 LBS | SYSTOLIC BLOOD PRESSURE: 124 MMHG | DIASTOLIC BLOOD PRESSURE: 64 MMHG

## 2022-11-30 DIAGNOSIS — E03.9 HYPOTHYROIDISM, UNSPECIFIED TYPE: ICD-10-CM

## 2022-11-30 DIAGNOSIS — R00.0 TACHYCARDIA: Primary | ICD-10-CM

## 2022-11-30 DIAGNOSIS — R00.2 PALPITATIONS: ICD-10-CM

## 2022-11-30 PROCEDURE — 99214 PR OFFICE/OUTPT VISIT, EST, LEVL IV, 30-39 MIN: ICD-10-PCS | Mod: S$GLB,,, | Performed by: PHYSICIAN ASSISTANT

## 2022-11-30 PROCEDURE — 4010F PR ACE/ARB THEARPY RXD/TAKEN: ICD-10-PCS | Mod: CPTII,S$GLB,, | Performed by: FAMILY MEDICINE

## 2022-11-30 PROCEDURE — 93000 ELECTROCARDIOGRAM COMPLETE: CPT | Mod: S$GLB,,, | Performed by: FAMILY MEDICINE

## 2022-11-30 PROCEDURE — 4010F ACE/ARB THERAPY RXD/TAKEN: CPT | Mod: CPTII,S$GLB,, | Performed by: FAMILY MEDICINE

## 2022-11-30 PROCEDURE — 3074F PR MOST RECENT SYSTOLIC BLOOD PRESSURE < 130 MM HG: ICD-10-PCS | Mod: CPTII,S$GLB,, | Performed by: FAMILY MEDICINE

## 2022-11-30 PROCEDURE — 3078F PR MOST RECENT DIASTOLIC BLOOD PRESSURE < 80 MM HG: ICD-10-PCS | Mod: CPTII,S$GLB,, | Performed by: FAMILY MEDICINE

## 2022-11-30 PROCEDURE — 3074F SYST BP LT 130 MM HG: CPT | Mod: CPTII,S$GLB,, | Performed by: FAMILY MEDICINE

## 2022-11-30 PROCEDURE — 3008F BODY MASS INDEX DOCD: CPT | Mod: CPTII,S$GLB,, | Performed by: FAMILY MEDICINE

## 2022-11-30 PROCEDURE — 3044F PR MOST RECENT HEMOGLOBIN A1C LEVEL <7.0%: ICD-10-PCS | Mod: CPTII,S$GLB,, | Performed by: FAMILY MEDICINE

## 2022-11-30 PROCEDURE — 93000 PR ELECTROCARDIOGRAM, COMPLETE: ICD-10-PCS | Mod: S$GLB,,, | Performed by: FAMILY MEDICINE

## 2022-11-30 PROCEDURE — 3078F DIAST BP <80 MM HG: CPT | Mod: CPTII,S$GLB,, | Performed by: FAMILY MEDICINE

## 2022-11-30 PROCEDURE — 99214 OFFICE O/P EST MOD 30 MIN: CPT | Mod: S$GLB,,, | Performed by: PHYSICIAN ASSISTANT

## 2022-11-30 PROCEDURE — 3044F HG A1C LEVEL LT 7.0%: CPT | Mod: CPTII,S$GLB,, | Performed by: FAMILY MEDICINE

## 2022-11-30 PROCEDURE — 1159F MED LIST DOCD IN RCRD: CPT | Mod: CPTII,S$GLB,, | Performed by: FAMILY MEDICINE

## 2022-11-30 PROCEDURE — 1159F PR MEDICATION LIST DOCUMENTED IN MEDICAL RECORD: ICD-10-PCS | Mod: CPTII,S$GLB,, | Performed by: FAMILY MEDICINE

## 2022-11-30 PROCEDURE — 3008F PR BODY MASS INDEX (BMI) DOCUMENTED: ICD-10-PCS | Mod: CPTII,S$GLB,, | Performed by: FAMILY MEDICINE

## 2022-11-30 RX ORDER — PROPRANOLOL HYDROCHLORIDE 60 MG/1
60 CAPSULE, EXTENDED RELEASE ORAL DAILY
Qty: 30 CAPSULE | Refills: 11 | Status: SHIPPED | OUTPATIENT
Start: 2022-11-30 | End: 2023-06-20

## 2022-11-30 NOTE — PROGRESS NOTES
Subjective:      Patient ID: Erica Law is a 42 y.o. female.    Chief Complaint: Palpitations (Patient is here for heart palpatations)      HPI  Pt is here today with a cc of elevated HR and palpitations x 6 mo  .    Reports noticing fluttering daily for a few min then resolves   Reviewed of prior records do show consistently elevated HR.     No new meds or med changes  Does not drink caffeine but she does vape frequently throughout the day     Review of Systems   Constitutional:  Negative for activity change, appetite change, chills, diaphoresis, fatigue and unexpected weight change.   Eyes:  Negative for visual disturbance.   Respiratory:  Negative for cough, chest tightness, shortness of breath and wheezing.    Cardiovascular:  Positive for palpitations. Negative for chest pain and leg swelling.   Gastrointestinal:  Negative for abdominal pain, constipation, nausea and vomiting.   Neurological:  Negative for dizziness, vertigo, tremors, syncope, weakness, light-headedness, numbness and headaches.   Psychiatric/Behavioral:  Negative for agitation, behavioral problems, confusion, decreased concentration, dysphoric mood, hallucinations, self-injury, sleep disturbance and suicidal ideas. The patient is not nervous/anxious and is not hyperactive.      Medication List with Changes/Refills   New Medications    PROPRANOLOL (INDERAL LA) 60 MG 24 HR CAPSULE    Take 1 capsule (60 mg total) by mouth once daily.   Current Medications    ADALIMUMAB (HUMIRA,CF,) 10 MG/0.1 ML SYKT    0.4 mLs.    ATORVASTATIN (LIPITOR) 40 MG TABLET    TAKE 1 TABLET BY MOUTH EVERY DAY    AZITHROMYCIN (Z-ALPA) 250 MG TABLET        DOXYCYCLINE (MONODOX) 100 MG CAPSULE    Take 1 capsule (100 mg total) by mouth 2 (two) times daily.    DULAGLUTIDE (TRULICITY) 1.5 MG/0.5 ML PEN INJECTOR    Inject 1.5 mg into the skin every 7 days.    ESCITALOPRAM OXALATE (LEXAPRO) 10 MG TABLET    TAKE 1 TABLET BY MOUTH EVERY DAY    FLUCONAZOLE (DIFLUCAN) 150 MG TAB     "TAKE 1 TABLET BY MOUTH ONCE A WEEK FOR 21 DAYS    GABAPENTIN (NEURONTIN) 300 MG CAPSULE    TAKE 1 CAPSULE BY MOUTH THREE TIMES A DAY    DARRINCF, PEN 40 MG/0.4 ML PNKT        IRON 100 PLUS TAB    TAKE 1 TABLET BY MOUTH TWICE A DAY    LEVOTHYROXINE (SYNTHROID) 50 MCG TABLET    TAKE 1 TABLET BY MOUTH EVERY DAY BEFORE BREAKFAST    NIFEDIPINE (ADALAT CC) 60 MG TBSR    TAKE 1 TABLET BY MOUTH EVERY DAY    PANTOPRAZOLE (PROTONIX) 40 MG TABLET    40 mg.   Discontinued Medications    CLONAZEPAM (KLONOPIN) 1 MG TABLET    Take 1 tablet (1 mg total) by mouth 2 (two) times daily as needed for Anxiety.    METFORMIN (GLUCOPHAGE) 500 MG TABLET    TAKE 1 TABLET BY MOUTH TWICE A DAY WITH MEALS        Objective:     Vitals:    11/30/22 1411   BP: 124/64   Pulse: (!) 134   SpO2: 98%   Weight: 108.8 kg (239 lb 12.8 oz)   Height: 5' 1" (1.549 m)        Physical Exam  Constitutional:       Appearance: Normal appearance. She is obese.   HENT:      Head: Normocephalic and atraumatic.      Nose: Nose normal.   Eyes:      Conjunctiva/sclera: Conjunctivae normal.      Comments: Eyes tracking normal on exam    Cardiovascular:      Rate and Rhythm: Regular rhythm. Tachycardia present.      Pulses: Normal pulses.      Heart sounds: Normal heart sounds. No murmur heard.    No friction rub. No gallop.   Pulmonary:      Effort: Pulmonary effort is normal. No respiratory distress.      Breath sounds: Normal breath sounds. No stridor. No wheezing, rhonchi or rales.   Musculoskeletal:      Right lower leg: No edema.      Left lower leg: No edema.      Comments: Moves all extremities well and with good control  Normal gait observed      Skin:     General: Skin is warm and dry.      Capillary Refill: Capillary refill takes less than 2 seconds.   Neurological:      Mental Status: She is alert and oriented to person, place, and time.   Psychiatric:         Mood and Affect: Mood normal.         Behavior: Behavior normal.         Thought Content: Thought " content normal.         Judgment: Judgment normal.     EKbpm sinus tach      Assessment & Plan:     Tachycardia  -     CBC Auto Differential; Future; Expected date: 2022  -     Comprehensive Metabolic Panel; Future; Expected date: 2022  -     T3; Future; Expected date: 2022  -     T4, Free; Future; Expected date: 2022  -     TSH; Future; Expected date: 2022  -     Holter monitor - 48 hour; Future  -     POCT EKG 12-LEAD (NOT FOR [facility] USE)  -     propranoloL (INDERAL LA) 60 MG 24 hr capsule; Take 1 capsule (60 mg total) by mouth once daily.  Dispense: 30 capsule; Refill: 11    Palpitations  -     CBC Auto Differential; Future; Expected date: 2022  -     Comprehensive Metabolic Panel; Future; Expected date: 2022  -     T3; Future; Expected date: 2022  -     T4, Free; Future; Expected date: 2022  -     TSH; Future; Expected date: 2022  -     Holter monitor - 48 hour; Future  -     POCT EKG 12-LEAD (NOT FOR [facility] USE)    Hypothyroidism, unspecified type  -     T3; Future; Expected date: 2022  -     T4, Free; Future; Expected date: 2022  -     TSH; Future; Expected date: 2022       Decrease vaping  Have holter done   2 week followup       -Patient instructed that our office calls back on all labs and imaging within 1 week of receiving the results. Patient instructed to reach out to our office if they have not heard from us so that we can request the results from the lab/imaging center           Pushpa Kinsey PA-C

## 2022-12-02 ENCOUNTER — PATIENT MESSAGE (OUTPATIENT)
Dept: FAMILY MEDICINE | Facility: CLINIC | Age: 42
End: 2022-12-02
Payer: MEDICAID

## 2022-12-06 LAB
ABS NRBC COUNT: 0 X 10 3/UL (ref 0–0.01)
ABSOLUTE BASOPHIL: 0.04 X 10 3/UL (ref 0–0.22)
ABSOLUTE EOSINOPHIL: 0.14 X 10 3/UL (ref 0.04–0.54)
ABSOLUTE IMMATURE GRAN: 0.15 X 10 3/UL (ref 0–0.04)
ABSOLUTE LYMPHOCYTE: 2.46 X 10 3/UL (ref 0.86–4.75)
ABSOLUTE MONOCYTE: 1.12 X 10 3/UL (ref 0.22–1.08)
ALBUMIN SERPL-MCNC: 3.3 G/DL (ref 3.5–5.2)
ALBUMIN/GLOB SERPL ELPH: 0.5 {RATIO} (ref 1–2.7)
ALP ISOS SERPL LEV INH-CCNC: 89 U/L (ref 35–105)
ALT (SGPT): 12 U/L (ref 0–33)
ANION GAP SERPL CALC-SCNC: 10 MMOL/L (ref 8–17)
AST SERPL-CCNC: 13 U/L (ref 0–32)
BASOPHILS NFR BLD: 0.3 % (ref 0.2–1.2)
BILIRUBIN, TOTAL: 0.28 MG/DL (ref 0–1.2)
BUN/CREAT SERPL: 14 (ref 6–20)
CALCIUM SERPL-MCNC: 9.3 MG/DL (ref 8.6–10.2)
CARBON DIOXIDE, CO2: 24 MMOL/L (ref 22–29)
CHLORIDE: 107 MMOL/L (ref 98–107)
CREAT SERPL-MCNC: 1.26 MG/DL (ref 0.5–0.9)
EOSINOPHIL NFR BLD: 0.9 % (ref 0.7–7)
GFR ESTIMATION: 55
GLOBULIN: 6.6 G/DL (ref 1.5–4.5)
GLUCOSE: 169 MG/DL (ref 74–106)
HCT VFR BLD AUTO: 31.4 % (ref 37–47)
HGB BLD-MCNC: 9.3 G/DL (ref 12–16)
IMMATURE GRANULOCYTES: 1 % (ref 0–0.5)
LYMPHOCYTES NFR BLD: 15.9 % (ref 19.3–53.1)
MCH RBC QN AUTO: 26.3 PG (ref 27–32)
MCHC RBC AUTO-ENTMCNC: 29.6 G/DL (ref 32–36)
MCV RBC AUTO: 89 FL (ref 82–100)
MONOCYTES NFR BLD: 7.2 % (ref 4.7–12.5)
NEUTROPHILS # BLD AUTO: 11.55 X 10 3/UL (ref 2.15–7.56)
NEUTROPHILS NFR BLD: 74.7 % (ref 34–71.1)
NUCLEATED RED BLOOD CELLS: 0 /100 WBC (ref 0–0.2)
PLATELET # BLD AUTO: 415 X 10 3/UL (ref 135–400)
POTASSIUM: 4.7 MMOL/L (ref 3.5–5.1)
PROT SNV-MCNC: 9.9 G/DL (ref 6.4–8.3)
RBC # BLD AUTO: 3.53 X 10 6/UL (ref 4.2–5.4)
RDW-SD: 53.6 FL (ref 37–54)
SODIUM: 141 MMOL/L (ref 136–145)
T3 SERPL-MCNC: 0.78 NG/ML (ref 0.8–2)
T4, FREE: 1.12 NG/DL (ref 0.93–1.7)
TSH SERPL DL<=0.005 MIU/L-ACNC: 1.14 UIU/ML (ref 0.27–4.2)
UREA NITROGEN (BUN): 17.7 MG/DL (ref 6–20)
WBC # BLD: 15.46 X 10 3/UL (ref 4.3–10.8)

## 2022-12-07 ENCOUNTER — PATIENT MESSAGE (OUTPATIENT)
Dept: FAMILY MEDICINE | Facility: CLINIC | Age: 42
End: 2022-12-07

## 2022-12-22 ENCOUNTER — OFFICE VISIT (OUTPATIENT)
Dept: PLASTIC SURGERY | Facility: CLINIC | Age: 42
End: 2022-12-22
Payer: MEDICARE

## 2022-12-22 VITALS
HEIGHT: 60 IN | WEIGHT: 240 LBS | OXYGEN SATURATION: 98 % | BODY MASS INDEX: 47.12 KG/M2 | HEART RATE: 105 BPM | DIASTOLIC BLOOD PRESSURE: 79 MMHG | SYSTOLIC BLOOD PRESSURE: 148 MMHG

## 2022-12-22 DIAGNOSIS — E66.9 OBESITY, UNSPECIFIED CLASSIFICATION, UNSPECIFIED OBESITY TYPE, UNSPECIFIED WHETHER SERIOUS COMORBIDITY PRESENT: Primary | ICD-10-CM

## 2022-12-22 PROCEDURE — 3044F HG A1C LEVEL LT 7.0%: CPT | Mod: CPTII,S$GLB,, | Performed by: SURGERY

## 2022-12-22 PROCEDURE — 3008F PR BODY MASS INDEX (BMI) DOCUMENTED: ICD-10-PCS | Mod: CPTII,S$GLB,, | Performed by: SURGERY

## 2022-12-22 PROCEDURE — 3077F SYST BP >= 140 MM HG: CPT | Mod: CPTII,S$GLB,, | Performed by: SURGERY

## 2022-12-22 PROCEDURE — 99204 PR OFFICE/OUTPT VISIT, NEW, LEVL IV, 45-59 MIN: ICD-10-PCS | Mod: S$GLB,,, | Performed by: SURGERY

## 2022-12-22 PROCEDURE — 3044F PR MOST RECENT HEMOGLOBIN A1C LEVEL <7.0%: ICD-10-PCS | Mod: CPTII,S$GLB,, | Performed by: SURGERY

## 2022-12-22 PROCEDURE — 3078F PR MOST RECENT DIASTOLIC BLOOD PRESSURE < 80 MM HG: ICD-10-PCS | Mod: CPTII,S$GLB,, | Performed by: SURGERY

## 2022-12-22 PROCEDURE — 3008F BODY MASS INDEX DOCD: CPT | Mod: CPTII,S$GLB,, | Performed by: SURGERY

## 2022-12-22 PROCEDURE — 1159F PR MEDICATION LIST DOCUMENTED IN MEDICAL RECORD: ICD-10-PCS | Mod: CPTII,S$GLB,, | Performed by: SURGERY

## 2022-12-22 PROCEDURE — 99204 OFFICE O/P NEW MOD 45 MIN: CPT | Mod: S$GLB,,, | Performed by: SURGERY

## 2022-12-22 PROCEDURE — 3078F DIAST BP <80 MM HG: CPT | Mod: CPTII,S$GLB,, | Performed by: SURGERY

## 2022-12-22 PROCEDURE — 1159F MED LIST DOCD IN RCRD: CPT | Mod: CPTII,S$GLB,, | Performed by: SURGERY

## 2022-12-22 PROCEDURE — 4010F ACE/ARB THERAPY RXD/TAKEN: CPT | Mod: CPTII,S$GLB,, | Performed by: SURGERY

## 2022-12-22 PROCEDURE — 4010F PR ACE/ARB THEARPY RXD/TAKEN: ICD-10-PCS | Mod: CPTII,S$GLB,, | Performed by: SURGERY

## 2022-12-22 PROCEDURE — 3077F PR MOST RECENT SYSTOLIC BLOOD PRESSURE >= 140 MM HG: ICD-10-PCS | Mod: CPTII,S$GLB,, | Performed by: SURGERY

## 2022-12-22 NOTE — PROGRESS NOTES
CONSULTATION NOTE    CC  Skin MassLesion  Bilateral underarm    Referring Provider: No ref. provider found  PCP: Jill Palacios MD    HPI  Erica Lwa is a 42 y.o. female presenting with  Hidradenitis right and left axilla, pubis , and buttocks. Right axilla draining since 31 years old. Hidradenitis began when she was 15 years old. Dermatologist is Dr. Soila Page. Has been taking Humira since . Current BMI 46.87. Uses vape w/ nicotine daily.         Accutane Use no    Tobacco Use yes      FINAL PATHOLOGIC DIAGNOSIS   No Prior biopsy    [facility]  Patient Active Problem List   Diagnosis    Morbidly obese    Hidradenitis suppurativa    Generalized anxiety disorder    Hypothyroidism    Hyperlipidemia    Positive SHELLY (antinuclear antibody)    Undifferentiated inflammatory arthritis    Rheumatoid arthritis of multiple sites with negative rheumatoid factor    Elevated sed rate    Elevated C-reactive protein (CRP)    Anemia    Nexplanon in place    Colitis    Hospital discharge follow-up    Fever    Dehydration    Type 2 diabetes mellitus, without long-term current use of insulin    Primary hypertension    Immunodeficiency disorder       PSH  Past Surgical History:   Procedure Laterality Date    ABCESS DRAINAGE      from the breast     BREAST CYST EXCISION       SECTION         FH  Family History   Problem Relation Age of Onset    Diabetes Mellitus Mother     Cancer Maternal Grandmother     Cancer Maternal Grandfather        MEDICATIONS  No outpatient medications have been marked as taking for the 22 encounter (Office Visit) with Yesenia Augustine MD.     Current Facility-Administered Medications for the 22 encounter (Office Visit) with Yesenia Augustine MD   Medication Dose Route Frequency Provider Last Rate Last Admin    medroxyPROGESTERone (DEPO-PROVERA) injection 150 mg  150 mg Intramuscular 1 time in Clinic/HOD Molly Benedict, LIZZIE        sodium chloride 0.9% bolus 1,000 mL  1,000 mL  Intravenous 1 time in Clinic/HOD Pushpa Kinsey PA-C           ALLERGIES  Review of patient's allergies indicates:  No Known Allergies    SOCIAL HISTORY  Social History     Tobacco Use    Smoking status: Every Day     Types: Vaping with nicotine    Smokeless tobacco: Never   Substance Use Topics    Alcohol use: Not Currently    Drug use: Never       ROS  Review of Systems   Constitutional:  Negative for chills, fever and malaise/fatigue.   HENT:  Negative for congestion.    Eyes:  Negative for blurred vision and double vision.   Respiratory:  Negative for cough and sputum production.    Cardiovascular:  Negative for chest pain and palpitations.   Gastrointestinal:  Negative for nausea and vomiting.   Genitourinary:  Negative for dysuria and hematuria.   Musculoskeletal:  Negative for back pain and joint pain.   Skin:  Negative for itching and rash.   Neurological:  Negative for dizziness, seizures and headaches.   Psychiatric/Behavioral:  Negative for depression. The patient is not nervous/anxious.        PHYSICAL EXAM  BP (!) 148/79 (BP Location: Right arm, Patient Position: Sitting, BP Method: Small (Automatic))   Pulse 105   Ht 5' (1.524 m)   Wt 108.9 kg (240 lb)   LMP  (LMP Unknown)   SpO2 98%   BMI 46.87 kg/m²      Constitutional: Pt is oriented to person, place, and time.  Pt appears well-developed and well-nourished.   HENT: Normocephalic and atraumatic.   Pulmonary/Chest: Effort normal. No respiratory distress.   Abdomen: Soft. Non-tender. No masses or distension.  Musculoskeletal: Normal range of motion. Pt exhibits no edema or deformity.   Neurological: Pt is alert and oriented to person, place, and time. No sensory deficit. Exhibits normal muscle tone.   Skin: Skin is warm. No rash noted. No erythema.     Multiple draining cysts right axilla dense scarring  Similar left axilla        ASSESSMENT       Will discuss options for surgery when BMI is less than 40 and nicotine cessation was discussed for  wound healing optimization, can send to cessation clinic discussion held for 5 min.  Continue humira and local wound care.    Refer to bariatric surgery- Dr. Sanchez   Clean hidradenitis with Vashe and cover w/ guaze   Sitz baths for buttocks and pubic area  Send progress notes to Dr. Soila Gustafson-Mehdi   Right axilla cleaned with Vashe and covered the ABD gauze.   Vashe given to pt

## 2023-01-18 ENCOUNTER — OFFICE VISIT (OUTPATIENT)
Dept: OBSTETRICS AND GYNECOLOGY | Facility: CLINIC | Age: 43
End: 2023-01-18
Payer: MEDICARE

## 2023-01-18 VITALS
HEIGHT: 60 IN | HEART RATE: 118 BPM | SYSTOLIC BLOOD PRESSURE: 111 MMHG | BODY MASS INDEX: 49.11 KG/M2 | DIASTOLIC BLOOD PRESSURE: 70 MMHG | WEIGHT: 250.13 LBS

## 2023-01-18 DIAGNOSIS — Z12.31 VISIT FOR SCREENING MAMMOGRAM: ICD-10-CM

## 2023-01-18 DIAGNOSIS — Z01.419 WELL WOMAN EXAM: Primary | ICD-10-CM

## 2023-01-18 DIAGNOSIS — Z30.09 FAMILY PLANNING: ICD-10-CM

## 2023-01-18 DIAGNOSIS — Z11.3 SCREEN FOR STD (SEXUALLY TRANSMITTED DISEASE): ICD-10-CM

## 2023-01-18 LAB
B-HCG UR QL: NEGATIVE
CTP QC/QA: YES

## 2023-01-18 PROCEDURE — 3074F PR MOST RECENT SYSTOLIC BLOOD PRESSURE < 130 MM HG: ICD-10-PCS | Mod: CPTII,S$GLB,, | Performed by: NURSE PRACTITIONER

## 2023-01-18 PROCEDURE — 3008F BODY MASS INDEX DOCD: CPT | Mod: CPTII,S$GLB,, | Performed by: NURSE PRACTITIONER

## 2023-01-18 PROCEDURE — 99396 PREV VISIT EST AGE 40-64: CPT | Mod: 25,S$GLB,, | Performed by: NURSE PRACTITIONER

## 2023-01-18 PROCEDURE — 1159F PR MEDICATION LIST DOCUMENTED IN MEDICAL RECORD: ICD-10-PCS | Mod: CPTII,S$GLB,, | Performed by: NURSE PRACTITIONER

## 2023-01-18 PROCEDURE — 81025 URINE PREGNANCY TEST: CPT | Mod: S$GLB,,, | Performed by: NURSE PRACTITIONER

## 2023-01-18 PROCEDURE — 99396 PR PREVENTIVE VISIT,EST,40-64: ICD-10-PCS | Mod: 25,S$GLB,, | Performed by: NURSE PRACTITIONER

## 2023-01-18 PROCEDURE — G0180 PR HOME HEALTH MD CERTIFICATION: ICD-10-PCS | Mod: ,,, | Performed by: FAMILY MEDICINE

## 2023-01-18 PROCEDURE — 81025 POCT URINE PREGNANCY: ICD-10-PCS | Mod: S$GLB,,, | Performed by: NURSE PRACTITIONER

## 2023-01-18 PROCEDURE — 3078F DIAST BP <80 MM HG: CPT | Mod: CPTII,S$GLB,, | Performed by: NURSE PRACTITIONER

## 2023-01-18 PROCEDURE — 3074F SYST BP LT 130 MM HG: CPT | Mod: CPTII,S$GLB,, | Performed by: NURSE PRACTITIONER

## 2023-01-18 PROCEDURE — 1159F MED LIST DOCD IN RCRD: CPT | Mod: CPTII,S$GLB,, | Performed by: NURSE PRACTITIONER

## 2023-01-18 PROCEDURE — G0180 MD CERTIFICATION HHA PATIENT: HCPCS | Mod: ,,, | Performed by: FAMILY MEDICINE

## 2023-01-18 PROCEDURE — 3008F PR BODY MASS INDEX (BMI) DOCUMENTED: ICD-10-PCS | Mod: CPTII,S$GLB,, | Performed by: NURSE PRACTITIONER

## 2023-01-18 PROCEDURE — 3078F PR MOST RECENT DIASTOLIC BLOOD PRESSURE < 80 MM HG: ICD-10-PCS | Mod: CPTII,S$GLB,, | Performed by: NURSE PRACTITIONER

## 2023-01-18 RX ORDER — MEDROXYPROGESTERONE ACETATE 150 MG/ML
150 INJECTION, SUSPENSION INTRAMUSCULAR
Status: COMPLETED | OUTPATIENT
Start: 2023-01-18 | End: 2023-01-18

## 2023-01-18 RX ADMIN — MEDROXYPROGESTERONE ACETATE 150 MG: 150 INJECTION, SUSPENSION INTRAMUSCULAR at 03:01

## 2023-01-18 NOTE — PROGRESS NOTES
Subjective:       Patient ID: Erica Law is a 42 y.o. female.    Chief Complaint:  Well Woman and Contraception      History of Present Illness   Presents for annual gyn exam. History and past labs reviewed with patient.    Recent hospitalization for strep infection of hydradenitis Currently receiving rocephin via picc line. Appt with Dr Silver tomorrow.   Using depo for cycle control r/t worsening hydradenitis during menstrual cycle.   Last pap negative with + HPV  Last mmg negative        OB History          2    Para   1    Term   1            AB   1    Living   1         SAB   1    IAB        Ectopic        Multiple        Live Births   1                  Review of Systems  Review of Systems   Constitutional:  Negative for chills and fever.   Respiratory:  Negative for shortness of breath.    Cardiovascular:  Negative for chest pain.   Gastrointestinal:  Negative for abdominal pain, blood in stool, constipation, diarrhea, nausea, vomiting and reflux.   Genitourinary:  Negative for dysmenorrhea, dyspareunia, dysuria, hematuria, hot flashes, menorrhagia, menstrual problem, pelvic pain, vaginal bleeding, vaginal discharge, postcoital bleeding and vaginal dryness.   Musculoskeletal:  Negative for arthralgias and joint swelling.   Integumentary:  Positive for mole/lesion. Negative for rash, hair changes, breast mass, nipple discharge and breast skin changes.   Psychiatric/Behavioral:  Negative for depression. The patient is not nervous/anxious.    Breast: Negative for asymmetry, lump, mass, nipple discharge and skin changes        Objective:     Vitals:    23 1308   BP: 111/70   Pulse: (!) 118   Weight: 113.5 kg (250 lb 2 oz)   Height: 5' (1.524 m)        Physical Exam:   Constitutional: She is oriented to person, place, and time. She appears well-developed and well-nourished.    HENT:   Head: Normocephalic and atraumatic.    Eyes: Pupils are equal, round, and reactive to light.  Conjunctivae are normal.    Neck: No thyromegaly present.     Pulmonary/Chest: Effort normal.        Abdominal: Soft.     Genitourinary:    Vagina and uterus normal.   Rectum:      Guaiac result negative.   Guaiac negative stool.    Pelvic exam was performed with patient supine.   Cervix is normal. No  no vaginal discharge in the vagina.    pap smear completed   Genitourinary Comments: Scarring from hydradenitis   Palpation difficult r/t habitus              Musculoskeletal: Normal range of motion and moves all extremeties.       Neurological: She is alert and oriented to person, place, and time. She has normal reflexes.    Skin: Skin is warm and dry.    Psychiatric: She has a normal mood and affect. Her behavior is normal. Judgment and thought content normal.     breast exam wnl- no nipple dc, skin changes, masses or lymph nodes palpated bilaterally. Scarring to lateral breast from hydradenitis     Assessment:     1. Well woman exam    2. Visit for screening mammogram    3. Family planning              Plan:       Well woman exam  -     Liquid-based pap smear, screening  -     Mammo Digital Screening Bilat; Future; Expected date: 01/18/2023    Visit for screening mammogram  -     Mammo Digital Screening Bilat; Future; Expected date: 01/18/2023    Family planning  -     medroxyPROGESTERone (DEPO-PROVERA) injection 150 mg  -     POCT urine pregnancy         Discussed weight gain associated w/ Depo. Pt wants to continue and consider options.   Risk assessment for inherited gyn cancer done   Healthy diet, lifestyle, and exercise   OTC Women's multi vitamin  KA w/Dr Silver and outpatient infusion center  Follow up in about 1 year (around 1/18/2024).

## 2023-01-19 LAB — Lab: NORMAL

## 2023-01-20 LAB
CHLAMYDIA: NEGATIVE
GONORRHEA: NEGATIVE
SOURCE: NORMAL

## 2023-01-25 ENCOUNTER — TELEPHONE (OUTPATIENT)
Dept: FAMILY MEDICINE | Facility: CLINIC | Age: 43
End: 2023-01-25
Payer: MEDICARE

## 2023-01-25 NOTE — TELEPHONE ENCOUNTER
----- Message from Pushpa Kinsey PA-C sent at 1/19/2023  1:48 PM CST -----  Contact: self  Spoke with antonio and we can refer her to HH but not write IV abx orders.  Needs ID for that likely  ----- Message -----  From: Ned Gannon MA  Sent: 1/17/2023   4:22 PM CST  To: Pushpa Kinsey PA-C    Called and spoke with HH and they are wanting to know will we sign off for her to have HH with Iv antibiodics  ----- Message -----  From: Sofi Lamb  Sent: 1/17/2023   2:40 PM CST  To: Antonio Dasilva ([facility]) Staff    Aida from [facility] is calling in regards to patient needing home health care..Please call her back at [phone]

## 2023-02-14 ENCOUNTER — DOCUMENT SCAN (OUTPATIENT)
Dept: HOME HEALTH SERVICES | Facility: HOSPITAL | Age: 43
End: 2023-02-14
Payer: MEDICARE

## 2023-02-15 ENCOUNTER — EXTERNAL HOME HEALTH (OUTPATIENT)
Dept: HOME HEALTH SERVICES | Facility: HOSPITAL | Age: 43
End: 2023-02-15
Payer: MEDICARE

## 2023-02-23 DIAGNOSIS — R73.03 PREDIABETES: Primary | ICD-10-CM

## 2023-02-23 RX ORDER — ORAL SEMAGLUTIDE 3 MG/1
3 TABLET ORAL DAILY
Qty: 90 TABLET | Refills: 1 | Status: SHIPPED | OUTPATIENT
Start: 2023-02-23 | End: 2023-04-06

## 2023-03-06 ENCOUNTER — DOCUMENT SCAN (OUTPATIENT)
Dept: HOME HEALTH SERVICES | Facility: HOSPITAL | Age: 43
End: 2023-03-06
Payer: MEDICARE

## 2023-03-11 ENCOUNTER — DOCUMENT SCAN (OUTPATIENT)
Dept: HOME HEALTH SERVICES | Facility: HOSPITAL | Age: 43
End: 2023-03-11
Payer: MEDICARE

## 2023-03-15 ENCOUNTER — DOCUMENT SCAN (OUTPATIENT)
Dept: HOME HEALTH SERVICES | Facility: HOSPITAL | Age: 43
End: 2023-03-15
Payer: MEDICARE

## 2023-03-19 PROCEDURE — G0179 PR HOME HEALTH MD RECERTIFICATION: ICD-10-PCS | Mod: ,,, | Performed by: FAMILY MEDICINE

## 2023-03-19 PROCEDURE — G0179 MD RECERTIFICATION HHA PT: HCPCS | Mod: ,,, | Performed by: FAMILY MEDICINE

## 2023-03-31 ENCOUNTER — EXTERNAL HOME HEALTH (OUTPATIENT)
Dept: HOME HEALTH SERVICES | Facility: HOSPITAL | Age: 43
End: 2023-03-31
Payer: MEDICARE

## 2023-04-01 ENCOUNTER — DOCUMENT SCAN (OUTPATIENT)
Dept: HOME HEALTH SERVICES | Facility: HOSPITAL | Age: 43
End: 2023-04-01
Payer: MEDICARE

## 2023-04-04 ENCOUNTER — PATIENT MESSAGE (OUTPATIENT)
Dept: FAMILY MEDICINE | Facility: CLINIC | Age: 43
End: 2023-04-04
Payer: MEDICARE

## 2023-04-05 ENCOUNTER — OFFICE VISIT (OUTPATIENT)
Dept: FAMILY MEDICINE | Facility: CLINIC | Age: 43
End: 2023-04-05
Payer: MEDICARE

## 2023-04-05 VITALS
SYSTOLIC BLOOD PRESSURE: 136 MMHG | OXYGEN SATURATION: 98 % | DIASTOLIC BLOOD PRESSURE: 77 MMHG | RESPIRATION RATE: 14 BRPM | HEART RATE: 99 BPM

## 2023-04-05 DIAGNOSIS — L73.2 HIDRADENITIS SUPPURATIVA: Primary | ICD-10-CM

## 2023-04-05 DIAGNOSIS — R73.03 PREDIABETES: ICD-10-CM

## 2023-04-05 DIAGNOSIS — D50.9 IRON DEFICIENCY ANEMIA, UNSPECIFIED IRON DEFICIENCY ANEMIA TYPE: ICD-10-CM

## 2023-04-05 LAB
ABS NRBC COUNT: 0 X 10 3/UL (ref 0–0.01)
ABSOLUTE BASOPHIL: 0.03 X 10 3/UL (ref 0–0.22)
ABSOLUTE EOSINOPHIL: 0.18 X 10 3/UL (ref 0.04–0.54)
ABSOLUTE IMMATURE GRAN: 0.07 X 10 3/UL (ref 0–0.04)
ABSOLUTE LYMPHOCYTE: 2.69 X 10 3/UL (ref 0.86–4.75)
ABSOLUTE MONOCYTE: 0.74 X 10 3/UL (ref 0.22–1.08)
ALBUMIN SERPL-MCNC: 3.4 G/DL (ref 3.5–5.2)
ALBUMIN/GLOB SERPL ELPH: 0.6 {RATIO} (ref 1–2.7)
ALP ISOS SERPL LEV INH-CCNC: 74 U/L (ref 35–105)
ALT (SGPT): 12 U/L (ref 0–33)
ANION GAP SERPL CALC-SCNC: 10 MMOL/L (ref 8–17)
AST SERPL-CCNC: 15 U/L (ref 0–32)
BASOPHILS NFR BLD: 0.3 % (ref 0.2–1.2)
BILIRUBIN, TOTAL: <0.15 MG/DL (ref 0–1.2)
BUN/CREAT SERPL: 13.4 (ref 6–20)
CALCIUM SERPL-MCNC: 9.2 MG/DL (ref 8.6–10.2)
CARBON DIOXIDE, CO2: 16 MMOL/L (ref 22–29)
CHLORIDE: 106 MMOL/L (ref 98–107)
CREAT SERPL-MCNC: 1.42 MG/DL (ref 0.5–0.9)
CRP QUALITATIVE: POSITIVE MG/L
CRP QUANTITATIVE: 121.4 MG/L
EOSINOPHIL NFR BLD: 1.5 % (ref 0.7–7)
ESTIMATED AVERAGE GLUCOSE: 102 MG/DL
GFR ESTIMATION: 47.65
GLOBULIN: 5.6 G/DL (ref 1.5–4.5)
GLUCOSE: 102 MG/DL (ref 74–106)
HBA1C MFR BLD: 5.2 % (ref 4–6)
HCT VFR BLD AUTO: 25.1 % (ref 37–47)
HGB BLD-MCNC: 7.1 G/DL (ref 12–16)
IMMATURE GRANULOCYTES: 0.6 % (ref 0–0.5)
LYMPHOCYTES NFR BLD: 22.6 % (ref 19.3–53.1)
MCH RBC QN AUTO: 25.3 PG (ref 27–32)
MCHC RBC AUTO-ENTMCNC: 28.3 G/DL (ref 32–36)
MCV RBC AUTO: 89.3 FL (ref 82–100)
MONOCYTES NFR BLD: 6.2 % (ref 4.7–12.5)
NEUTROPHILS # BLD AUTO: 8.18 X 10 3/UL (ref 2.15–7.56)
NEUTROPHILS NFR BLD: 68.8 % (ref 34–71.1)
NUCLEATED RED BLOOD CELLS: 0 /100 WBC (ref 0–0.2)
PLATELET # BLD AUTO: 418 X 10 3/UL (ref 135–400)
POTASSIUM: 4.6 MMOL/L (ref 3.5–5.1)
PROT SNV-MCNC: 9 G/DL (ref 6.4–8.3)
RBC # BLD AUTO: 2.81 X 10 6/UL (ref 4.2–5.4)
RDW-SD: 59.7 FL (ref 37–54)
SED RATE (WESTERGREN): 70 MM/HR (ref 0–20)
SMEAR REVIEW: NORMAL
SODIUM: 132 MMOL/L (ref 136–145)
UREA NITROGEN (BUN): 19 MG/DL (ref 6–20)
WBC # BLD: 11.89 X 10 3/UL (ref 4.3–10.8)

## 2023-04-05 PROCEDURE — 1160F PR REVIEW ALL MEDS BY PRESCRIBER/CLIN PHARMACIST DOCUMENTED: ICD-10-PCS | Mod: CPTII,S$GLB,, | Performed by: FAMILY MEDICINE

## 2023-04-05 PROCEDURE — 3075F PR MOST RECENT SYSTOLIC BLOOD PRESS GE 130-139MM HG: ICD-10-PCS | Mod: CPTII,S$GLB,, | Performed by: FAMILY MEDICINE

## 2023-04-05 PROCEDURE — 3078F DIAST BP <80 MM HG: CPT | Mod: CPTII,S$GLB,, | Performed by: FAMILY MEDICINE

## 2023-04-05 PROCEDURE — 99214 PR OFFICE/OUTPT VISIT, EST, LEVL IV, 30-39 MIN: ICD-10-PCS | Mod: 25,S$GLB,, | Performed by: FAMILY MEDICINE

## 2023-04-05 PROCEDURE — 96372 PR INJECTION,THERAP/PROPH/DIAG2ST, IM OR SUBCUT: ICD-10-PCS | Mod: S$GLB,,, | Performed by: FAMILY MEDICINE

## 2023-04-05 PROCEDURE — 96372 THER/PROPH/DIAG INJ SC/IM: CPT | Mod: S$GLB,,, | Performed by: FAMILY MEDICINE

## 2023-04-05 PROCEDURE — 3078F PR MOST RECENT DIASTOLIC BLOOD PRESSURE < 80 MM HG: ICD-10-PCS | Mod: CPTII,S$GLB,, | Performed by: FAMILY MEDICINE

## 2023-04-05 PROCEDURE — 1159F MED LIST DOCD IN RCRD: CPT | Mod: CPTII,S$GLB,, | Performed by: FAMILY MEDICINE

## 2023-04-05 PROCEDURE — 1159F PR MEDICATION LIST DOCUMENTED IN MEDICAL RECORD: ICD-10-PCS | Mod: CPTII,S$GLB,, | Performed by: FAMILY MEDICINE

## 2023-04-05 PROCEDURE — 3075F SYST BP GE 130 - 139MM HG: CPT | Mod: CPTII,S$GLB,, | Performed by: FAMILY MEDICINE

## 2023-04-05 PROCEDURE — 4010F ACE/ARB THERAPY RXD/TAKEN: CPT | Mod: CPTII,S$GLB,, | Performed by: FAMILY MEDICINE

## 2023-04-05 PROCEDURE — 1160F RVW MEDS BY RX/DR IN RCRD: CPT | Mod: CPTII,S$GLB,, | Performed by: FAMILY MEDICINE

## 2023-04-05 PROCEDURE — 99214 OFFICE O/P EST MOD 30 MIN: CPT | Mod: 25,S$GLB,, | Performed by: FAMILY MEDICINE

## 2023-04-05 PROCEDURE — 4010F PR ACE/ARB THEARPY RXD/TAKEN: ICD-10-PCS | Mod: CPTII,S$GLB,, | Performed by: FAMILY MEDICINE

## 2023-04-05 RX ORDER — CEFTRIAXONE 1 G/1
1 INJECTION, POWDER, FOR SOLUTION INTRAMUSCULAR; INTRAVENOUS
Status: COMPLETED | OUTPATIENT
Start: 2023-04-05 | End: 2023-04-05

## 2023-04-05 RX ORDER — CLINDAMYCIN HYDROCHLORIDE 300 MG/1
300 CAPSULE ORAL EVERY 6 HOURS
Qty: 28 CAPSULE | Refills: 0 | Status: SHIPPED | OUTPATIENT
Start: 2023-04-05 | End: 2023-04-12

## 2023-04-05 RX ORDER — LIDOCAINE HYDROCHLORIDE 10 MG/ML
2.1 INJECTION INFILTRATION; PERINEURAL ONCE
Status: SHIPPED | OUTPATIENT
Start: 2023-04-05

## 2023-04-05 RX ADMIN — CEFTRIAXONE 1 G: 1 INJECTION, POWDER, FOR SOLUTION INTRAMUSCULAR; INTRAVENOUS at 02:04

## 2023-04-06 ENCOUNTER — PATIENT MESSAGE (OUTPATIENT)
Dept: FAMILY MEDICINE | Facility: CLINIC | Age: 43
End: 2023-04-06
Payer: MEDICARE

## 2023-04-06 ENCOUNTER — TELEPHONE (OUTPATIENT)
Dept: FAMILY MEDICINE | Facility: CLINIC | Age: 43
End: 2023-04-06
Payer: MEDICARE

## 2023-04-06 NOTE — PROGRESS NOTES
Subjective     Patient ID: Erica Law is a 42 y.o. female.    Chief Complaint: Follow-up (Skin/)    HPI    F/u  - pt had labs drawn 2 days ago and hgb was 7.7  She is asymptomatic, last value we have was 9's  She does have heavy menstrual bleeding  She has several abscesses currently    Review of Systems   Constitutional:  Negative for chills, fatigue and fever.   Eyes:  Negative for visual disturbance.   Respiratory:  Negative for cough, chest tightness, shortness of breath and wheezing.    Cardiovascular:  Negative for chest pain and palpitations.   Gastrointestinal:  Negative for abdominal pain, nausea and vomiting.   Endocrine: Negative for polydipsia and polyuria.   Genitourinary:  Negative for dysuria and frequency.   Musculoskeletal:  Negative for arthralgias and myalgias.   Integumentary:  Negative for rash.   Neurological:  Negative for dizziness, weakness, light-headedness and headaches.        Objective     Physical Exam  Vitals reviewed.   Constitutional:       General: She is not in acute distress.     Appearance: She is well-developed. She is not diaphoretic.   HENT:      Head: Normocephalic and atraumatic.      Nose: Nose normal.      Mouth/Throat:      Mouth: Mucous membranes are moist.      Pharynx: Oropharynx is clear.   Eyes:      Conjunctiva/sclera: Conjunctivae normal.      Pupils: Pupils are equal, round, and reactive to light.   Neck:      Thyroid: No thyromegaly.      Vascular: No JVD.   Cardiovascular:      Rate and Rhythm: Normal rate and regular rhythm.      Pulses: Normal pulses.      Heart sounds: Normal heart sounds. No murmur heard.    No friction rub. No gallop.   Pulmonary:      Effort: Pulmonary effort is normal. No respiratory distress.      Breath sounds: Normal breath sounds. No stridor. No wheezing or rales.   Abdominal:      General: Bowel sounds are normal. There is no distension.      Palpations: Abdomen is soft.      Tenderness: There is no abdominal tenderness.    Musculoskeletal:         General: No swelling or tenderness.      Cervical back: Normal range of motion and neck supple.      Right lower leg: No edema.      Left lower leg: No edema.   Lymphadenopathy:      Cervical: No cervical adenopathy.   Skin:     General: Skin is warm and dry.      Coloration: Skin is not pale.      Findings: No erythema or rash.   Neurological:      Mental Status: She is alert and oriented to person, place, and time.   Psychiatric:         Mood and Affect: Mood normal.         Behavior: Behavior normal.          Assessment and Plan     Problem List Items Addressed This Visit          Derm    Hidradenitis suppurativa - Primary    Relevant Medications    cefTRIAXone injection 1 g (Completed)    LIDOcaine HCL 10 mg/ml (1%) injection 2.1 mL    Other Relevant Orders    CBC Auto Differential       Oncology    Anemia     Other Visit Diagnoses       Prediabetes        Relevant Medications    cefTRIAXone injection 1 g (Completed)    LIDOcaine HCL 10 mg/ml (1%) injection 2.1 mL    Other Relevant Orders    Comprehensive Metabolic Panel    Hemoglobin A1C            Hidradenitis suppurativa  -     CBC Auto Differential; Future; Expected date: 04/05/2023  -     cefTRIAXone injection 1 g  -     LIDOcaine HCL 10 mg/ml (1%) injection 2.1 mL    Prediabetes  -     Comprehensive Metabolic Panel; Future; Expected date: 04/05/2023  -     Hemoglobin A1C; Future; Expected date: 04/05/2023  -     cefTRIAXone injection 1 g  -     LIDOcaine HCL 10 mg/ml (1%) injection 2.1 mL    Iron deficiency anemia, unspecified iron deficiency anemia type  Comments:  last hgb 7.7, asymptomatic, rechecking today     Sent to ED after results - hgb 7.1

## 2023-04-06 NOTE — TELEPHONE ENCOUNTER
04/06/2023 @ 10:50am-PER DR. ALFREDO SMITH: can you tell her her hgb dropped some and she's about to be in need of a blood transfusion - we may have to send her through the ER at [facility] though in case they want to scope her?      04/06/2023 @ 1103AM-Patient contacted, and instructed to go to [facility] ER for work up. Pt verbalizes understanding, and will go to er ASAP.    04/06/2023 @ 1105-Report given to [facility] ER. Labs/demographics faxed to ER staff.

## 2023-04-12 ENCOUNTER — PATIENT MESSAGE (OUTPATIENT)
Dept: FAMILY MEDICINE | Facility: CLINIC | Age: 43
End: 2023-04-12
Payer: MEDICARE

## 2023-04-15 ENCOUNTER — DOCUMENT SCAN (OUTPATIENT)
Dept: HOME HEALTH SERVICES | Facility: HOSPITAL | Age: 43
End: 2023-04-15
Payer: MEDICARE

## 2023-04-17 ENCOUNTER — OFFICE VISIT (OUTPATIENT)
Dept: FAMILY MEDICINE | Facility: CLINIC | Age: 43
End: 2023-04-17
Payer: MEDICARE

## 2023-04-17 DIAGNOSIS — D64.9 ANEMIA, UNSPECIFIED TYPE: ICD-10-CM

## 2023-04-17 DIAGNOSIS — I10 PRIMARY HYPERTENSION: Primary | ICD-10-CM

## 2023-04-17 DIAGNOSIS — L73.2 HIDRADENITIS SUPPURATIVA: ICD-10-CM

## 2023-04-17 DIAGNOSIS — E03.9 HYPOTHYROIDISM, UNSPECIFIED TYPE: ICD-10-CM

## 2023-04-17 DIAGNOSIS — N28.9 RENAL INSUFFICIENCY: ICD-10-CM

## 2023-04-17 DIAGNOSIS — E66.01 MORBIDLY OBESE: ICD-10-CM

## 2023-04-17 PROCEDURE — 3078F DIAST BP <80 MM HG: CPT | Mod: CPTII,S$GLB,, | Performed by: PHYSICIAN ASSISTANT

## 2023-04-17 PROCEDURE — 1159F PR MEDICATION LIST DOCUMENTED IN MEDICAL RECORD: ICD-10-PCS | Mod: CPTII,S$GLB,, | Performed by: PHYSICIAN ASSISTANT

## 2023-04-17 PROCEDURE — 99214 OFFICE O/P EST MOD 30 MIN: CPT | Mod: S$GLB,,, | Performed by: PHYSICIAN ASSISTANT

## 2023-04-17 PROCEDURE — 3077F PR MOST RECENT SYSTOLIC BLOOD PRESSURE >= 140 MM HG: ICD-10-PCS | Mod: CPTII,S$GLB,, | Performed by: PHYSICIAN ASSISTANT

## 2023-04-17 PROCEDURE — 1159F MED LIST DOCD IN RCRD: CPT | Mod: CPTII,S$GLB,, | Performed by: PHYSICIAN ASSISTANT

## 2023-04-17 PROCEDURE — 3078F PR MOST RECENT DIASTOLIC BLOOD PRESSURE < 80 MM HG: ICD-10-PCS | Mod: CPTII,S$GLB,, | Performed by: PHYSICIAN ASSISTANT

## 2023-04-17 PROCEDURE — 99214 PR OFFICE/OUTPT VISIT, EST, LEVL IV, 30-39 MIN: ICD-10-PCS | Mod: S$GLB,,, | Performed by: PHYSICIAN ASSISTANT

## 2023-04-17 PROCEDURE — 3077F SYST BP >= 140 MM HG: CPT | Mod: CPTII,S$GLB,, | Performed by: PHYSICIAN ASSISTANT

## 2023-04-17 PROCEDURE — 4010F ACE/ARB THERAPY RXD/TAKEN: CPT | Mod: CPTII,S$GLB,, | Performed by: PHYSICIAN ASSISTANT

## 2023-04-17 PROCEDURE — 4010F PR ACE/ARB THEARPY RXD/TAKEN: ICD-10-PCS | Mod: CPTII,S$GLB,, | Performed by: PHYSICIAN ASSISTANT

## 2023-04-17 NOTE — PROGRESS NOTES
Subjective:      Patient ID: Erica Law is a 43 y.o. female.    Chief Complaint: Follow-up      HPI  Pt is here today for followup     HA-  currently followed by Dr. Silver and Dr. Harding .  Pt encouraged to keep followup. Pt reports recurrent pain from these infection,  requesting tramadol refill.     Anemia-  pt with chronic anemia.  She denies heavy menstruation.  She was sent to the ER recent for Hgb of 7.1, but was DC same day when it was 8 in ER.  Pt has seen [facility] ,  I encouraged her to followup .  Will order c scope today.     BP-  elevated BP noted.  Pt reports she has a resolving covid infection.  Likely accounting for mild elevation today.  Will recheck in 1 mo      Renal insufficiency- noted on most recent labs.  Will recheck in 1 mo     Review of Systems   Constitutional:  Negative for activity change, appetite change, chills, diaphoresis, fatigue and unexpected weight change.   Eyes:  Negative for visual disturbance.   Respiratory:  Negative for cough, chest tightness, shortness of breath and wheezing.    Cardiovascular:  Negative for chest pain, palpitations and leg swelling.   Gastrointestinal:  Negative for abdominal pain, constipation, nausea and vomiting.   Neurological:  Negative for dizziness, vertigo, tremors, syncope, weakness, light-headedness, numbness and headaches.   Psychiatric/Behavioral:  Negative for agitation, behavioral problems, confusion, decreased concentration, dysphoric mood, hallucinations, self-injury, sleep disturbance and suicidal ideas. The patient is not nervous/anxious and is not hyperactive.      Medication List with Changes/Refills   Current Medications    ADALIMUMAB (HUMIRA,CF,) 10 MG/0.1 ML SYKT    0.4 mLs.    ATORVASTATIN (LIPITOR) 40 MG TABLET    TAKE 1 TABLET BY MOUTH EVERY DAY    AZITHROMYCIN (Z-ALPA) 250 MG TABLET        ESCITALOPRAM OXALATE (LEXAPRO) 10 MG TABLET    TAKE 1 TABLET BY MOUTH EVERY DAY    GABAPENTIN (NEURONTIN) 300 MG CAPSULE    TAKE 1  CAPSULE BY MOUTH THREE TIMES A DAY    DERIAN CLIFFORD, PEN 40 MG/0.4 ML PNKT        IRON 100 PLUS TAB    TAKE 1 TABLET BY MOUTH TWICE A DAY    LEVOTHYROXINE (SYNTHROID) 50 MCG TABLET    TAKE 1 TABLET BY MOUTH EVERY DAY BEFORE BREAKFAST    NIFEDIPINE (ADALAT CC) 60 MG TBSR    TAKE 1 TABLET BY MOUTH EVERY DAY    PANTOPRAZOLE (PROTONIX) 40 MG TABLET    40 mg.    PROPRANOLOL (INDERAL LA) 60 MG 24 HR CAPSULE    Take 1 capsule (60 mg total) by mouth once daily.        Objective:     Vitals:    04/17/23 0846   BP: (!) 148/76   Pulse: 95   Resp: 16   SpO2: 99%        Physical Exam  Constitutional:       Appearance: Normal appearance. She is normal weight.   HENT:      Head: Normocephalic and atraumatic.      Nose: Nose normal.   Eyes:      Conjunctiva/sclera: Conjunctivae normal.      Comments: Eyes tracking normal on exam    Cardiovascular:      Rate and Rhythm: Normal rate and regular rhythm.      Pulses: Normal pulses.      Heart sounds: Normal heart sounds. No murmur heard.    No friction rub. No gallop.   Pulmonary:      Effort: Pulmonary effort is normal. No respiratory distress.      Breath sounds: Normal breath sounds. No stridor. No wheezing, rhonchi or rales.   Musculoskeletal:      Right lower leg: No edema.      Left lower leg: No edema.      Comments: Moves all extremities well and with good control  Normal gait observed      Skin:     General: Skin is warm and dry.      Capillary Refill: Capillary refill takes less than 2 seconds.   Neurological:      Mental Status: She is alert and oriented to person, place, and time.   Psychiatric:         Mood and Affect: Mood normal.         Behavior: Behavior normal.         Thought Content: Thought content normal.         Judgment: Judgment normal.          Assessment & Plan:     Primary hypertension  Comments:  mildly elevated today  continue current regimen,  1 mo f/u   Orders:  -     Comprehensive Metabolic Panel; Future; Expected date: 04/17/2023    Hidradenitis  suppurativa  Comments:  keep f/u with derm and gen surg     Anemia, unspecified type  Comments:  f/u with hemeonc   Orders:  -     Ambulatory referral/consult to General Surgery; Future; Expected date: 04/24/2023  -     CBC Auto Differential; Future; Expected date: 04/17/2023    Hypothyroidism, unspecified type  -     T4, Free; Future; Expected date: 04/17/2023  -     TSH; Future; Expected date: 04/17/2023  -     Lipid Panel; Future; Expected date: 04/17/2023    Renal insufficiency  Comments:  labs in 4 week for f/u   Orders:  -     Comprehensive Metabolic Panel; Future; Expected date: 04/17/2023  -     Urinalysis, Reflex to Urine Culture Urine, Clean Catch; Future; Expected date: 04/18/2023    Morbidly obese  -     Lipid Panel; Future; Expected date: 04/17/2023           1 mo followup       -Patient instructed that our office calls back on all labs and imaging within 1 week of receiving the results. Patient instructed to reach out to our office if they have not heard from us so that we can request the results from the lab/imaging center           Pushpa Kinsey PA-C

## 2023-04-19 DIAGNOSIS — D64.9 ANEMIA, UNSPECIFIED TYPE: Primary | ICD-10-CM

## 2023-04-21 VITALS
HEIGHT: 60 IN | BODY MASS INDEX: 49.08 KG/M2 | WEIGHT: 250 LBS | DIASTOLIC BLOOD PRESSURE: 76 MMHG | HEART RATE: 95 BPM | RESPIRATION RATE: 16 BRPM | OXYGEN SATURATION: 99 % | SYSTOLIC BLOOD PRESSURE: 148 MMHG

## 2023-04-24 ENCOUNTER — PATIENT MESSAGE (OUTPATIENT)
Dept: HEMATOLOGY/ONCOLOGY | Facility: CLINIC | Age: 43
End: 2023-04-24
Payer: MEDICARE

## 2023-05-04 ENCOUNTER — PATIENT MESSAGE (OUTPATIENT)
Dept: ADMINISTRATIVE | Facility: HOSPITAL | Age: 43
End: 2023-05-04
Payer: MEDICARE

## 2023-05-04 ENCOUNTER — PATIENT MESSAGE (OUTPATIENT)
Dept: FAMILY MEDICINE | Facility: CLINIC | Age: 43
End: 2023-05-04
Payer: MEDICARE

## 2023-05-09 ENCOUNTER — TELEPHONE (OUTPATIENT)
Dept: OBSTETRICS AND GYNECOLOGY | Facility: CLINIC | Age: 43
End: 2023-05-09
Payer: MEDICARE

## 2023-05-09 NOTE — TELEPHONE ENCOUNTER
----- Message from Misty Bhardwaj sent at 5/9/2023  4:35 PM CDT -----  Type:   Appointment Request      Name of Caller:Pt   When is the first available appointment?N/A   Symptoms:birth control shot   Best Call Back Number:[phone]  Additional Information: Portal Message     [phone]      Message  Appointment Request From: Erica Law  With Provider: Heather De Santiago NP [[facility]) - OB GYN]  Preferred Date Range: Any date 5/10/2023 or later  Preferred Times: Any Time  Reason for visit: Birth Control Shot  Comments:  Shot

## 2023-05-10 ENCOUNTER — CLINICAL SUPPORT (OUTPATIENT)
Dept: OBSTETRICS AND GYNECOLOGY | Facility: CLINIC | Age: 43
End: 2023-05-10
Payer: MEDICARE

## 2023-05-10 DIAGNOSIS — Z30.42 ENCOUNTER FOR DEPO-PROVERA CONTRACEPTION: Primary | ICD-10-CM

## 2023-05-10 PROCEDURE — 96372 THER/PROPH/DIAG INJ SC/IM: CPT | Mod: S$GLB,,, | Performed by: NURSE PRACTITIONER

## 2023-05-10 PROCEDURE — 96372 PR INJECTION,THERAP/PROPH/DIAG2ST, IM OR SUBCUT: ICD-10-PCS | Mod: S$GLB,,, | Performed by: NURSE PRACTITIONER

## 2023-05-10 RX ORDER — MEDROXYPROGESTERONE ACETATE 150 MG/ML
150 INJECTION, SUSPENSION INTRAMUSCULAR
Status: COMPLETED | OUTPATIENT
Start: 2023-05-10 | End: 2023-05-10

## 2023-05-10 RX ADMIN — MEDROXYPROGESTERONE ACETATE 150 MG: 150 INJECTION, SUSPENSION INTRAMUSCULAR at 11:05

## 2023-05-18 LAB
ABS NRBC COUNT: 0 X 10 3/UL (ref 0–0.01)
ABSOLUTE BASOPHIL: 0.04 X 10 3/UL (ref 0–0.22)
ABSOLUTE EOSINOPHIL: 0.13 X 10 3/UL (ref 0.04–0.54)
ABSOLUTE IMMATURE GRAN: 0.06 X 10 3/UL (ref 0–0.04)
ABSOLUTE LYMPHOCYTE: 2.74 X 10 3/UL (ref 0.86–4.75)
ABSOLUTE MONOCYTE: 0.69 X 10 3/UL (ref 0.22–1.08)
ALBUMIN SERPL-MCNC: 3.6 G/DL (ref 3.5–5.2)
ALBUMIN/GLOB SERPL ELPH: 0.6 {RATIO} (ref 1–2.7)
ALP ISOS SERPL LEV INH-CCNC: 79 U/L (ref 35–105)
ALT (SGPT): 10 U/L (ref 0–33)
AMORPH URATE CRY URNS QL MICRO: ABNORMAL
ANION GAP SERPL CALC-SCNC: 12 MMOL/L (ref 8–17)
AST SERPL-CCNC: 14 U/L (ref 0–32)
BACTERIA #/AREA URNS HPF: NEGATIVE /[HPF]
BASOPHILS NFR BLD: 0.3 % (ref 0.2–1.2)
BILIRUB UR QL STRIP: NEGATIVE
BILIRUBIN, TOTAL: 0.22 MG/DL (ref 0–1.2)
BUN/CREAT SERPL: 12.5 (ref 6–20)
CALCIUM SERPL-MCNC: 9.6 MG/DL (ref 8.6–10.2)
CARBON DIOXIDE, CO2: 19 MMOL/L (ref 22–29)
CHLORIDE: 103 MMOL/L (ref 98–107)
CHOLEST SERPL-MSCNC: 163 MG/DL (ref 100–200)
CLARITY UR: ABNORMAL
COLOR UR: YELLOW
CREAT SERPL-MCNC: 1.29 MG/DL (ref 0.5–0.9)
EOSINOPHIL NFR BLD: 1.1 % (ref 0.7–7)
EPITHELIAL CELLS: NEGATIVE
GFR ESTIMATION: 53.14
GLOBULIN: 6.1 G/DL (ref 1.5–4.5)
GLUCOSE (UA): NEGATIVE MG/DL
GLUCOSE: 128 MG/DL (ref 74–106)
HCT VFR BLD AUTO: 28.2 % (ref 37–47)
HDLC SERPL-MCNC: 65 MG/DL
HGB BLD-MCNC: 8.1 G/DL (ref 12–16)
IMMATURE GRANULOCYTES: 0.5 % (ref 0–0.5)
KETONES UR QL STRIP: ABNORMAL MG/DL
LDL/HDL RATIO: 1.2 (ref 1–3)
LDLC SERPL CALC-MCNC: 80 MG/DL (ref 0–100)
LEUKOCYTE ESTERASE UR QL STRIP: ABNORMAL
LYMPHOCYTES NFR BLD: 22.2 % (ref 19.3–53.1)
MCH RBC QN AUTO: 24.6 PG (ref 27–32)
MCHC RBC AUTO-ENTMCNC: 28.7 G/DL (ref 32–36)
MCV RBC AUTO: 85.7 FL (ref 82–100)
MONOCYTES NFR BLD: 5.6 % (ref 4.7–12.5)
MUCOUS THREADS URNS QL MICRO: NEGATIVE
NEUTROPHILS # BLD AUTO: 8.66 X 10 3/UL (ref 2.15–7.56)
NEUTROPHILS NFR BLD: 70.3 % (ref 34–71.1)
NITRITE UR QL STRIP: NEGATIVE
NUCLEATED RED BLOOD CELLS: 0 /100 WBC (ref 0–0.2)
OCCULT BLOOD: ABNORMAL
PH, URINE: 5 (ref 5–7.5)
PLATELET # BLD AUTO: 449 X 10 3/UL (ref 135–400)
POTASSIUM: 4.4 MMOL/L (ref 3.5–5.1)
PROT SNV-MCNC: 9.7 G/DL (ref 6.4–8.3)
PROT UR QL STRIP: 500 MG/DL
RBC # BLD AUTO: 3.29 X 10 6/UL (ref 4.2–5.4)
RBC/HPF: NEGATIVE
RDW-SD: 57.4 FL (ref 37–54)
SMEAR REVIEW: NORMAL
SODIUM: 134 MMOL/L (ref 136–145)
SP GR UR STRIP: 1.02 (ref 1–1.03)
T4, FREE: 1.26 NG/DL (ref 0.93–1.7)
TRIGL SERPL-MCNC: 90 MG/DL (ref 0–150)
TSH SERPL DL<=0.005 MIU/L-ACNC: 2.34 UIU/ML (ref 0.27–4.2)
UREA NITROGEN (BUN): 16.1 MG/DL (ref 6–20)
UROBILINOGEN, URINE: NORMAL E.U./DL (ref 0–1)
WBC # BLD: 12.32 X 10 3/UL (ref 4.3–10.8)
WBC/HPF: ABNORMAL

## 2023-05-18 PROCEDURE — G0179 MD RECERTIFICATION HHA PT: HCPCS | Mod: ,,, | Performed by: FAMILY MEDICINE

## 2023-05-18 PROCEDURE — G0179 PR HOME HEALTH MD RECERTIFICATION: ICD-10-PCS | Mod: ,,, | Performed by: FAMILY MEDICINE

## 2023-05-19 ENCOUNTER — OFFICE VISIT (OUTPATIENT)
Dept: FAMILY MEDICINE | Facility: CLINIC | Age: 43
End: 2023-05-19
Payer: MEDICARE

## 2023-05-19 ENCOUNTER — PATIENT OUTREACH (OUTPATIENT)
Dept: ADMINISTRATIVE | Facility: HOSPITAL | Age: 43
End: 2023-05-19
Payer: MEDICARE

## 2023-05-19 VITALS
TEMPERATURE: 99 F | HEART RATE: 89 BPM | HEIGHT: 60 IN | WEIGHT: 230 LBS | OXYGEN SATURATION: 98 % | BODY MASS INDEX: 45.16 KG/M2 | DIASTOLIC BLOOD PRESSURE: 80 MMHG | SYSTOLIC BLOOD PRESSURE: 136 MMHG

## 2023-05-19 DIAGNOSIS — L02.415 ABSCESS OF RIGHT THIGH: Primary | ICD-10-CM

## 2023-05-19 DIAGNOSIS — E11.69 TYPE 2 DIABETES MELLITUS WITH OTHER SPECIFIED COMPLICATION, WITHOUT LONG-TERM CURRENT USE OF INSULIN: ICD-10-CM

## 2023-05-19 PROCEDURE — 3044F PR MOST RECENT HEMOGLOBIN A1C LEVEL <7.0%: ICD-10-PCS | Mod: CPTII,S$GLB,, | Performed by: NURSE PRACTITIONER

## 2023-05-19 PROCEDURE — 1160F PR REVIEW ALL MEDS BY PRESCRIBER/CLIN PHARMACIST DOCUMENTED: ICD-10-PCS | Mod: CPTII,S$GLB,, | Performed by: NURSE PRACTITIONER

## 2023-05-19 PROCEDURE — 4010F PR ACE/ARB THEARPY RXD/TAKEN: ICD-10-PCS | Mod: CPTII,S$GLB,, | Performed by: NURSE PRACTITIONER

## 2023-05-19 PROCEDURE — 3008F BODY MASS INDEX DOCD: CPT | Mod: CPTII,S$GLB,, | Performed by: NURSE PRACTITIONER

## 2023-05-19 PROCEDURE — 3079F PR MOST RECENT DIASTOLIC BLOOD PRESSURE 80-89 MM HG: ICD-10-PCS | Mod: CPTII,S$GLB,, | Performed by: NURSE PRACTITIONER

## 2023-05-19 PROCEDURE — 1159F PR MEDICATION LIST DOCUMENTED IN MEDICAL RECORD: ICD-10-PCS | Mod: CPTII,S$GLB,, | Performed by: NURSE PRACTITIONER

## 2023-05-19 PROCEDURE — 99214 OFFICE O/P EST MOD 30 MIN: CPT | Mod: S$GLB,,, | Performed by: NURSE PRACTITIONER

## 2023-05-19 PROCEDURE — 4010F ACE/ARB THERAPY RXD/TAKEN: CPT | Mod: CPTII,S$GLB,, | Performed by: NURSE PRACTITIONER

## 2023-05-19 PROCEDURE — 99214 PR OFFICE/OUTPT VISIT, EST, LEVL IV, 30-39 MIN: ICD-10-PCS | Mod: S$GLB,,, | Performed by: NURSE PRACTITIONER

## 2023-05-19 PROCEDURE — 1160F RVW MEDS BY RX/DR IN RCRD: CPT | Mod: CPTII,S$GLB,, | Performed by: NURSE PRACTITIONER

## 2023-05-19 PROCEDURE — 3044F HG A1C LEVEL LT 7.0%: CPT | Mod: CPTII,S$GLB,, | Performed by: NURSE PRACTITIONER

## 2023-05-19 PROCEDURE — 3075F SYST BP GE 130 - 139MM HG: CPT | Mod: CPTII,S$GLB,, | Performed by: NURSE PRACTITIONER

## 2023-05-19 PROCEDURE — 3075F PR MOST RECENT SYSTOLIC BLOOD PRESS GE 130-139MM HG: ICD-10-PCS | Mod: CPTII,S$GLB,, | Performed by: NURSE PRACTITIONER

## 2023-05-19 PROCEDURE — 3079F DIAST BP 80-89 MM HG: CPT | Mod: CPTII,S$GLB,, | Performed by: NURSE PRACTITIONER

## 2023-05-19 PROCEDURE — 3008F PR BODY MASS INDEX (BMI) DOCUMENTED: ICD-10-PCS | Mod: CPTII,S$GLB,, | Performed by: NURSE PRACTITIONER

## 2023-05-19 PROCEDURE — 1159F MED LIST DOCD IN RCRD: CPT | Mod: CPTII,S$GLB,, | Performed by: NURSE PRACTITIONER

## 2023-05-19 RX ORDER — ORAL SEMAGLUTIDE 3 MG/1
3 TABLET ORAL DAILY
COMMUNITY
End: 2023-05-19 | Stop reason: ALTCHOICE

## 2023-05-19 RX ORDER — TIRZEPATIDE 2.5 MG/.5ML
2.5 INJECTION, SOLUTION SUBCUTANEOUS
Qty: 4 PEN | Refills: 0 | Status: SHIPPED | OUTPATIENT
Start: 2023-05-19 | End: 2023-06-20

## 2023-05-19 RX ORDER — CEFTRIAXONE 1 G/1
1 INJECTION, POWDER, FOR SOLUTION INTRAMUSCULAR; INTRAVENOUS
Status: COMPLETED | OUTPATIENT
Start: 2023-05-19 | End: 2023-05-19

## 2023-05-19 RX ADMIN — CEFTRIAXONE 1 G: 1 INJECTION, POWDER, FOR SOLUTION INTRAMUSCULAR; INTRAVENOUS at 11:05

## 2023-05-19 NOTE — PROGRESS NOTES
DM EYE EXAM: per chart review pt is overdue for dm eye exam, spoke with pt she has not had a dm eye exam but will self schedule with someone near her home.

## 2023-05-19 NOTE — PROGRESS NOTES
Patient ID: Erica Law  MRN: [id number]    Chief Complaint: Other (Talked about the mounjaro)  She is also here to review labs . She has chronic anemia and has received blood transfusion in the past few months. She has multiple skin abscesses and sees surgeon Sue Silver. She has another apt with her May 31. Currently she has a draining abscess to the left posterior thigh. Review of labs shows anemia and elevated wbc. She is not currently on an oral antibiotic. She denies any nausea, fever or chills.     Allergies: Patient has No Known Allergies.     Smoking status:  Social History     Tobacco Use   Smoking Status Every Day    Types: Vaping with nicotine   Smokeless Tobacco Never       Problem List:  Patient Active Problem List   Diagnosis    Morbidly obese    Hidradenitis suppurativa    Generalized anxiety disorder    Hypothyroidism    Hyperlipidemia    Positive SHELLY (antinuclear antibody)    Undifferentiated inflammatory arthritis    Rheumatoid arthritis of multiple sites with negative rheumatoid factor    Elevated sed rate    Elevated C-reactive protein (CRP)    Anemia    Nexplanon in place    Colitis    Hospital discharge follow-up    Fever    Dehydration    Type 2 diabetes mellitus, without long-term current use of insulin    Primary hypertension    Immunodeficiency disorder    Renal insufficiency        Current Medications:  Current Outpatient Medications   Medication Instructions    atorvastatin (LIPITOR) 40 MG tablet TAKE 1 TABLET BY MOUTH EVERY DAY    EScitalopram oxalate (LEXAPRO) 10 MG tablet TAKE 1 TABLET BY MOUTH EVERY DAY    gabapentin (NEURONTIN) 300 MG capsule TAKE 1 CAPSULE BY MOUTH THREE TIMES A DAY    IRON 100 PLUS Tab TAKE 1 TABLET BY MOUTH TWICE A DAY    levothyroxine (SYNTHROID) 50 MCG tablet TAKE 1 TABLET BY MOUTH EVERY DAY BEFORE BREAKFAST    MOUNJARO 2.5 mg, Subcutaneous, Every 7 days    NIFEdipine (ADALAT CC) 60 MG TbSR TAKE 1 TABLET BY MOUTH EVERY DAY    pantoprazole (PROTONIX) 40 mg     propranoloL (INDERAL LA) 60 mg, Oral, Daily       History of Present Illness:  The patient is a 43 y.o. Black or  female who presents to clinic for evaluation and management with a chief complaint of Other (Talked about the mounjaro)     HPI see above.     Review of Systems   Constitutional: Negative.    HENT: Negative.     Eyes: Negative.    Respiratory: Negative.     Cardiovascular: Negative.    Gastrointestinal: Negative.    Endocrine: Negative.    Genitourinary: Negative.    Musculoskeletal: Negative.    Integumentary:  Positive for wound.        Skin abscesses on breasts, recent incision and drainage performed by surgeon. She has an active abscess , draining , very tender left posterior thigh.    Allergic/Immunologic: Negative.    Neurological: Negative.    Hematological: Negative.    Psychiatric/Behavioral: Negative.        Visit Vitals  /80 (BP Location: Right arm, Patient Position: Sitting, BP Method: Large (Manual))   Pulse 89   Temp 98.7 °F (37.1 °C) (Oral)   Ht 5' (1.524 m)   Wt 104.3 kg (230 lb)   SpO2 98%   BMI 44.92 kg/m²       Physical Exam  Vitals and nursing note reviewed.   Constitutional:       Appearance: She is obese.   HENT:      Head: Normocephalic.   Eyes:      Conjunctiva/sclera: Conjunctivae normal.   Cardiovascular:      Rate and Rhythm: Normal rate and regular rhythm.   Pulmonary:      Effort: Pulmonary effort is normal.      Breath sounds: Normal breath sounds.   Musculoskeletal:         General: Normal range of motion.      Cervical back: Normal range of motion.   Skin:     General: Skin is warm and dry.      Comments: Swelling and tenderness noted to left posterior thigh. Abscess is covered by a clean bandage which remains intact.    Neurological:      General: No focal deficit present.      Mental Status: She is alert.   Psychiatric:         Mood and Affect: Mood normal.         Behavior: Behavior normal.        Assessment & Plan:  1. Abscess of right thigh  -      cefTRIAXone injection 1 g  Will forward recent labs to patients surgeon, Dr. Sue Silver, pt has scheduled apt with her May 31. Pt has chronic Supprative hydradenitis and has multiple abscesses, recently has surgery on both breasts. Hx of multiple oral antibiotics per patient, currently not on antibiotic. Will give the rocephin today.     2. Type 2 diabetes mellitus with other specified complication, without long-term current use of insulin  -     tirzepatide (MOUNJARO) 2.5 mg/0.5 mL PnIj; Inject 2.5 mg into the skin every 7 days.  Dispense: 4 pen; Refill: 0     Currently the A1c represents good control although she is quite anemic so this test is not as accurate. Pt needs to check bs am fasting and 2 hours pps . She would like to try the Mounjaro to help aid in further weight loss. She has lillian the Rybelsus but would like to do a weekly injection as difficult to remember to take the daily pill.     Future Appointments   Date Time Provider Department Center   5/19/2023 12:00 PM Nasreen Mcdonald NP [facility] [address]   8/2/2023  9:30 AM [facility] [address] [facility]   1/22/2024  1:30 PM Heather De Santiago NP [facility] [address]     Follow up in about 4 weeks (around 6/16/2023).    Nasreen Mcdonald NP    Lab Frequency Next Occurrence   Ambulatory referral/consult to Pain Clinic Once 05/18/2022   Ambulatory referral/consult to General Surgery Once 08/18/2022   US Breast Right Complete Once 08/12/2022   Mammo Digital Diagnostic Right with Artis Once 08/12/2022   US Breast Right Limited Once 08/17/2022   Ambulatory referral/consult to Plastic Surgery Once 10/11/2022   Ambulatory referral/consult to Bariatric Surgery Once 01/22/2023   Ambulatory referral/consult to General Surgery Once 04/24/2023   Anti-DNA antibody, double-stranded     C3 complement     C4 complement     Urinalysis

## 2023-05-20 ENCOUNTER — DOCUMENT SCAN (OUTPATIENT)
Dept: HOME HEALTH SERVICES | Facility: HOSPITAL | Age: 43
End: 2023-05-20
Payer: MEDICARE

## 2023-05-26 ENCOUNTER — PATIENT MESSAGE (OUTPATIENT)
Dept: FAMILY MEDICINE | Facility: CLINIC | Age: 43
End: 2023-05-26
Payer: MEDICARE

## 2023-05-30 ENCOUNTER — TELEPHONE (OUTPATIENT)
Dept: FAMILY MEDICINE | Facility: CLINIC | Age: 43
End: 2023-05-30
Payer: MEDICARE

## 2023-05-30 DIAGNOSIS — E78.5 HYPERLIPIDEMIA, UNSPECIFIED HYPERLIPIDEMIA TYPE: ICD-10-CM

## 2023-05-30 RX ORDER — ATORVASTATIN CALCIUM 40 MG/1
TABLET, FILM COATED ORAL
Qty: 90 TABLET | Refills: 1 | Status: SHIPPED | OUTPATIENT
Start: 2023-05-30 | End: 2023-12-11

## 2023-06-01 ENCOUNTER — EXTERNAL HOME HEALTH (OUTPATIENT)
Dept: HOME HEALTH SERVICES | Facility: HOSPITAL | Age: 43
End: 2023-06-01
Payer: MEDICARE

## 2023-06-02 ENCOUNTER — DOCUMENT SCAN (OUTPATIENT)
Dept: HOME HEALTH SERVICES | Facility: HOSPITAL | Age: 43
End: 2023-06-02
Payer: MEDICARE

## 2023-06-14 RX ORDER — NIFEDIPINE 60 MG/1
TABLET, EXTENDED RELEASE ORAL
Qty: 90 TABLET | Refills: 1 | Status: SHIPPED | OUTPATIENT
Start: 2023-06-14 | End: 2023-12-19

## 2023-06-20 ENCOUNTER — OFFICE VISIT (OUTPATIENT)
Dept: FAMILY MEDICINE | Facility: CLINIC | Age: 43
End: 2023-06-20
Payer: MEDICARE

## 2023-06-20 VITALS
DIASTOLIC BLOOD PRESSURE: 60 MMHG | BODY MASS INDEX: 45.16 KG/M2 | OXYGEN SATURATION: 98 % | HEIGHT: 60 IN | WEIGHT: 230 LBS | SYSTOLIC BLOOD PRESSURE: 115 MMHG | HEART RATE: 115 BPM

## 2023-06-20 DIAGNOSIS — D64.9 CHRONIC ANEMIA: ICD-10-CM

## 2023-06-20 DIAGNOSIS — E11.9 TYPE 2 DIABETES MELLITUS WITHOUT COMPLICATION, WITHOUT LONG-TERM CURRENT USE OF INSULIN: Primary | ICD-10-CM

## 2023-06-20 DIAGNOSIS — R00.0 TACHYCARDIA: ICD-10-CM

## 2023-06-20 PROCEDURE — 4010F PR ACE/ARB THEARPY RXD/TAKEN: ICD-10-PCS | Mod: CPTII,S$GLB,, | Performed by: NURSE PRACTITIONER

## 2023-06-20 PROCEDURE — 1160F RVW MEDS BY RX/DR IN RCRD: CPT | Mod: CPTII,S$GLB,, | Performed by: NURSE PRACTITIONER

## 2023-06-20 PROCEDURE — 1159F MED LIST DOCD IN RCRD: CPT | Mod: CPTII,S$GLB,, | Performed by: NURSE PRACTITIONER

## 2023-06-20 PROCEDURE — 3078F DIAST BP <80 MM HG: CPT | Mod: CPTII,S$GLB,, | Performed by: NURSE PRACTITIONER

## 2023-06-20 PROCEDURE — 1159F PR MEDICATION LIST DOCUMENTED IN MEDICAL RECORD: ICD-10-PCS | Mod: CPTII,S$GLB,, | Performed by: NURSE PRACTITIONER

## 2023-06-20 PROCEDURE — 4010F ACE/ARB THERAPY RXD/TAKEN: CPT | Mod: CPTII,S$GLB,, | Performed by: NURSE PRACTITIONER

## 2023-06-20 PROCEDURE — 3044F PR MOST RECENT HEMOGLOBIN A1C LEVEL <7.0%: ICD-10-PCS | Mod: CPTII,S$GLB,, | Performed by: NURSE PRACTITIONER

## 2023-06-20 PROCEDURE — 3044F HG A1C LEVEL LT 7.0%: CPT | Mod: CPTII,S$GLB,, | Performed by: NURSE PRACTITIONER

## 2023-06-20 PROCEDURE — 3074F SYST BP LT 130 MM HG: CPT | Mod: CPTII,S$GLB,, | Performed by: NURSE PRACTITIONER

## 2023-06-20 PROCEDURE — 3078F PR MOST RECENT DIASTOLIC BLOOD PRESSURE < 80 MM HG: ICD-10-PCS | Mod: CPTII,S$GLB,, | Performed by: NURSE PRACTITIONER

## 2023-06-20 PROCEDURE — 3074F PR MOST RECENT SYSTOLIC BLOOD PRESSURE < 130 MM HG: ICD-10-PCS | Mod: CPTII,S$GLB,, | Performed by: NURSE PRACTITIONER

## 2023-06-20 PROCEDURE — 3008F PR BODY MASS INDEX (BMI) DOCUMENTED: ICD-10-PCS | Mod: CPTII,S$GLB,, | Performed by: NURSE PRACTITIONER

## 2023-06-20 PROCEDURE — 1160F PR REVIEW ALL MEDS BY PRESCRIBER/CLIN PHARMACIST DOCUMENTED: ICD-10-PCS | Mod: CPTII,S$GLB,, | Performed by: NURSE PRACTITIONER

## 2023-06-20 PROCEDURE — 99214 PR OFFICE/OUTPT VISIT, EST, LEVL IV, 30-39 MIN: ICD-10-PCS | Mod: S$GLB,,, | Performed by: NURSE PRACTITIONER

## 2023-06-20 PROCEDURE — 99214 OFFICE O/P EST MOD 30 MIN: CPT | Mod: S$GLB,,, | Performed by: NURSE PRACTITIONER

## 2023-06-20 PROCEDURE — 3008F BODY MASS INDEX DOCD: CPT | Mod: CPTII,S$GLB,, | Performed by: NURSE PRACTITIONER

## 2023-06-20 RX ORDER — CIPROFLOXACIN 500 MG/1
500 TABLET ORAL 2 TIMES DAILY
COMMUNITY
Start: 2023-06-19 | End: 2023-11-08

## 2023-06-20 RX ORDER — HYDROCODONE BITARTRATE AND ACETAMINOPHEN 5; 325 MG/1; MG/1
1 TABLET ORAL
COMMUNITY
Start: 2023-06-19

## 2023-06-20 RX ORDER — TIRZEPATIDE 5 MG/.5ML
5 INJECTION, SOLUTION SUBCUTANEOUS
Qty: 4 PEN | Refills: 0 | Status: SHIPPED | OUTPATIENT
Start: 2023-06-20 | End: 2023-07-17

## 2023-06-20 NOTE — PROGRESS NOTES
Patient ID: Erica Law  MRN: [id number]    Chief Complaint: Follow-up  Pt is here  today to follow up on Mounjaro. We started her on 2.5 mg sub q last month and she has tolerated well. She is requesting to increase the dosage today. She is seeing surgeon , Dr. Sue Silver for wound care. She has chronic suprativa /abscesses and will be having another surgery to place a cod-skin graft on June 29th. Pt notes Dr. Silver ordered labs for her yesterday which she did.     Allergies: Patient has No Known Allergies.     Smoking status:  Social History     Tobacco Use   Smoking Status Every Day    Types: Vaping with nicotine   Smokeless Tobacco Never       Problem List:  Patient Active Problem List   Diagnosis    Morbidly obese    Hidradenitis suppurativa    Generalized anxiety disorder    Hypothyroidism    Hyperlipidemia    Positive SHELLY (antinuclear antibody)    Undifferentiated inflammatory arthritis    Rheumatoid arthritis of multiple sites with negative rheumatoid factor    Elevated sed rate    Elevated C-reactive protein (CRP)    Anemia    Nexplanon in place    Colitis    Hospital discharge follow-up    Fever    Dehydration    Type 2 diabetes mellitus, without long-term current use of insulin    Primary hypertension    Immunodeficiency disorder    Renal insufficiency        Current Medications:  Current Outpatient Medications   Medication Instructions    atorvastatin (LIPITOR) 40 MG tablet TAKE 1 TABLET BY MOUTH EVERY DAY    ciprofloxacin HCl (CIPRO) 500 mg, Oral, 2 times daily    EScitalopram oxalate (LEXAPRO) 10 MG tablet TAKE 1 TABLET BY MOUTH EVERY DAY    gabapentin (NEURONTIN) 300 MG capsule TAKE 1 CAPSULE BY MOUTH THREE TIMES A DAY    HYDROcodone-acetaminophen (NORCO) 5-325 mg per tablet 1 tablet, Oral, Every 4-6 hours PRN    IRON 100 PLUS Tab TAKE 1 TABLET BY MOUTH TWICE A DAY    levothyroxine (SYNTHROID) 50 MCG tablet TAKE 1 TABLET BY MOUTH EVERY DAY BEFORE BREAKFAST    MOUNJARO 5 mg, Subcutaneous,  Every 7 days    NIFEdipine (ADALAT CC) 60 MG TbSR TAKE 1 TABLET BY MOUTH EVERY DAY       History of Present Illness:  The patient is a 43 y.o. Black or  female who presents to clinic for evaluation and management with a chief complaint of Follow-up     HPI Pt lillian Mounjaro 2.5 mg well. She is requesting to increase dose to 5 mg once a week. Pt to be scheduled for skin grafting with surgeon on June 29th. She is chronically anemic , did labs for Dr. Silver yesterday although pt is not sure what she had drawn. I will order cbc  although if this was already done, pt will have results sent to me. Currently she said she has not been taking her propranolol but has been taking the nifedipine. Her heart rate is elevated at rest , this may be due to her chronic anemia.     Review of Systems   Constitutional: Negative.    HENT: Negative.     Eyes: Negative.    Respiratory: Negative.     Cardiovascular: Negative.    Endocrine: Negative.    Genitourinary: Negative.    Musculoskeletal: Negative.    Integumentary:         Chronic abscesses ... sees surgeon ,Dr. Silver.    Allergic/Immunologic: Negative.    Neurological: Negative.    Hematological: Negative.    Psychiatric/Behavioral: Negative.        Visit Vitals  /60 (BP Location: Right arm, Patient Position: Sitting, BP Method: Large (Automatic))   Pulse (!) 115   Ht 5' (1.524 m)   Wt 104.3 kg (230 lb)   SpO2 98%   BMI 44.92 kg/m²       Physical Exam  Vitals and nursing note reviewed.   Constitutional:       Appearance: Normal appearance.   HENT:      Head: Normocephalic.   Eyes:      Conjunctiva/sclera: Conjunctivae normal.   Cardiovascular:      Rate and Rhythm: Normal rate and regular rhythm.   Pulmonary:      Effort: Pulmonary effort is normal.      Breath sounds: Normal breath sounds.   Musculoskeletal:         General: Normal range of motion.      Cervical back: Normal range of motion.   Skin:     General: Skin is warm and dry.      Comments:  Chronic wounds to buttocks / breasts. Under the care of surgeon . To be scheduled for skin graft on June 29th.    Neurological:      General: No focal deficit present.      Mental Status: She is alert.   Psychiatric:         Mood and Affect: Mood normal.         Behavior: Behavior normal.        Assessment & Plan:  1. Type 2 diabetes mellitus without complication, without long-term current use of insulin  I have asked pt to start checking her blood glucose levels as the A1c is not a reliable test in the setting of her chronic anemia. I have asked her to check  am fasting and two hour pp. Currently she is only taking Mounjaro for control of blood glucose levels. She was instructed we are increasing dose from 2.5 mg to 5 mg once a week. She is to notify m e of any side effects and stop the medication if she develops severe nausea or vomiting or abd pain. Return in 4 weeks for assessment.       2. Chronic anemia  -     CBC auto differential; Future; Expected date: 06/20/2023  Pt said she did labs for her surgeon yesterday but is not sure if she had a cbc done or not. I have asked her to find out and if so to please forward to us. She has been chronically anemic. She has also been referred to hematology in the past and was found to have an anemia of chronic disease. She has had issues with chronic supprativa hydradenitis and multiple wounds and skin flaps , surgeries, in addition to hypothyroidism, + SHELLY and DM, Obesity. Hematologist had previously treated patient with iron supplementation taken with  Vit C and she had improvement with that in the past.        3. Tachycardia  -     TSH w/reflex to Free T4; Future; Expected date: 06/20/2023  We will check thyroid status and make adjustments if needed, it is likely the tachycardia is secondary anemia.     Other orders  -     tirzepatide (MOUNJARO) 5 mg/0.5 mL PnIj; Inject 5 mg into the skin every 7 days.  Dispense: 4 pen; Refill: 0         Future Appointments   Date Time  Provider Department Center   8/2/2023  9:30 AM [facility] [address] [facility] [place]   1/22/2024  1:30 PM Heather De Santiago NP [facility]       Follow up in about 4 weeks (around 7/18/2023).      Nasreen Mcdonald NP    Lab Frequency Next Occurrence   Ambulatory referral/consult to General Surgery Once 08/18/2022   US Breast Right Complete Once 08/12/2022   Mammo Digital Diagnostic Right with Artis Once 08/12/2022   US Breast Right Limited Once 08/17/2022   Ambulatory referral/consult to Plastic Surgery Once 10/11/2022   Ambulatory referral/consult to Bariatric Surgery Once 01/22/2023   Ambulatory referral/consult to General Surgery Once 04/24/2023   Anti-DNA antibody, double-stranded     C3 complement     C4 complement     Urinalysis

## 2023-06-21 LAB
ABS NRBC COUNT: 0 X 10 3/UL (ref 0–0.01)
ABSOLUTE BASOPHIL: 0.04 X 10 3/UL (ref 0–0.22)
ABSOLUTE EOSINOPHIL: 0.11 X 10 3/UL (ref 0.04–0.54)
ABSOLUTE IMMATURE GRAN: 0.09 X 10 3/UL (ref 0–0.04)
ABSOLUTE LYMPHOCYTE: 2.02 X 10 3/UL (ref 0.86–4.75)
ABSOLUTE MONOCYTE: 0.7 X 10 3/UL (ref 0.22–1.08)
BASOPHILS NFR BLD: 0.3 % (ref 0.2–1.2)
EOSINOPHIL NFR BLD: 0.9 % (ref 0.7–7)
HCT VFR BLD AUTO: 26 % (ref 37–47)
HGB BLD-MCNC: 7.4 G/DL (ref 12–16)
IMMATURE GRANULOCYTES: 0.7 % (ref 0–0.5)
LYMPHOCYTES NFR BLD: 15.9 % (ref 19.3–53.1)
MCH RBC QN AUTO: 23.4 PG (ref 27–32)
MCHC RBC AUTO-ENTMCNC: 28.5 G/DL (ref 32–36)
MCV RBC AUTO: 82.3 FL (ref 82–100)
MONOCYTES NFR BLD: 5.5 % (ref 4.7–12.5)
NEUTROPHILS # BLD AUTO: 9.72 X 10 3/UL (ref 2.15–7.56)
NEUTROPHILS NFR BLD: 76.7 % (ref 34–71.1)
NUCLEATED RED BLOOD CELLS: 0 /100 WBC (ref 0–0.2)
PLATELET # BLD AUTO: 525 X 10 3/UL (ref 135–400)
RBC # BLD AUTO: 3.16 X 10 6/UL (ref 4.2–5.4)
RDW-SD: 54.9 FL (ref 37–54)
SMEAR REVIEW: NORMAL
TSH W/REFLEX TO FT4: 2.91 UIU/ML (ref 0.27–4.2)
WBC # BLD: 12.68 X 10 3/UL (ref 4.3–10.8)

## 2023-06-22 ENCOUNTER — TELEPHONE (OUTPATIENT)
Dept: HEMATOLOGY/ONCOLOGY | Facility: CLINIC | Age: 43
End: 2023-06-22
Payer: MEDICARE

## 2023-06-22 ENCOUNTER — TELEPHONE (OUTPATIENT)
Dept: FAMILY MEDICINE | Facility: CLINIC | Age: 43
End: 2023-06-22
Payer: MEDICARE

## 2023-06-22 DIAGNOSIS — D64.9 ANEMIA, UNSPECIFIED TYPE: Primary | ICD-10-CM

## 2023-06-22 NOTE — TELEPHONE ENCOUNTER
Patient preferred to go to the path lab because she is a hard stick. I told her that the iron studies were not drawn when Nasreen ordered labs earlier this week so we would have to redraw. Patient opted to go to pathlab tomorrow to be drawn and then seen Jill in clinic on Monday.     She was instructed to go to the ED if she started experiences SOB or chest pain. Patient voiced understanding.

## 2023-06-22 NOTE — TELEPHONE ENCOUNTER
Called to inform patient of her lab results ,ie very low hemoglobin and hmcrt and persistently elevated wbc. I have urged her to schedule an apt with her hematologist, Dr. Burgos. I have forwarded copy of labs to him as well. Pt verbalized comprehension.

## 2023-06-26 ENCOUNTER — OFFICE VISIT (OUTPATIENT)
Dept: HEMATOLOGY/ONCOLOGY | Facility: CLINIC | Age: 43
End: 2023-06-26
Payer: MEDICARE

## 2023-06-26 VITALS
WEIGHT: 224.63 LBS | RESPIRATION RATE: 16 BRPM | DIASTOLIC BLOOD PRESSURE: 80 MMHG | HEIGHT: 60 IN | OXYGEN SATURATION: 95 % | SYSTOLIC BLOOD PRESSURE: 127 MMHG | BODY MASS INDEX: 44.1 KG/M2 | HEART RATE: 111 BPM

## 2023-06-26 DIAGNOSIS — D64.9 ANEMIA, UNSPECIFIED TYPE: Primary | ICD-10-CM

## 2023-06-26 PROCEDURE — 3079F DIAST BP 80-89 MM HG: CPT | Mod: CPTII,S$GLB,, | Performed by: NURSE PRACTITIONER

## 2023-06-26 PROCEDURE — 4010F PR ACE/ARB THEARPY RXD/TAKEN: ICD-10-PCS | Mod: CPTII,S$GLB,, | Performed by: NURSE PRACTITIONER

## 2023-06-26 PROCEDURE — 1159F PR MEDICATION LIST DOCUMENTED IN MEDICAL RECORD: ICD-10-PCS | Mod: CPTII,S$GLB,, | Performed by: NURSE PRACTITIONER

## 2023-06-26 PROCEDURE — 3044F PR MOST RECENT HEMOGLOBIN A1C LEVEL <7.0%: ICD-10-PCS | Mod: CPTII,S$GLB,, | Performed by: NURSE PRACTITIONER

## 2023-06-26 PROCEDURE — 3074F SYST BP LT 130 MM HG: CPT | Mod: CPTII,S$GLB,, | Performed by: NURSE PRACTITIONER

## 2023-06-26 PROCEDURE — 99214 OFFICE O/P EST MOD 30 MIN: CPT | Mod: S$GLB,,, | Performed by: NURSE PRACTITIONER

## 2023-06-26 PROCEDURE — 3044F HG A1C LEVEL LT 7.0%: CPT | Mod: CPTII,S$GLB,, | Performed by: NURSE PRACTITIONER

## 2023-06-26 PROCEDURE — 3079F PR MOST RECENT DIASTOLIC BLOOD PRESSURE 80-89 MM HG: ICD-10-PCS | Mod: CPTII,S$GLB,, | Performed by: NURSE PRACTITIONER

## 2023-06-26 PROCEDURE — 4010F ACE/ARB THERAPY RXD/TAKEN: CPT | Mod: CPTII,S$GLB,, | Performed by: NURSE PRACTITIONER

## 2023-06-26 PROCEDURE — 3008F PR BODY MASS INDEX (BMI) DOCUMENTED: ICD-10-PCS | Mod: CPTII,S$GLB,, | Performed by: NURSE PRACTITIONER

## 2023-06-26 PROCEDURE — 99214 PR OFFICE/OUTPT VISIT, EST, LEVL IV, 30-39 MIN: ICD-10-PCS | Mod: S$GLB,,, | Performed by: NURSE PRACTITIONER

## 2023-06-26 PROCEDURE — 3074F PR MOST RECENT SYSTOLIC BLOOD PRESSURE < 130 MM HG: ICD-10-PCS | Mod: CPTII,S$GLB,, | Performed by: NURSE PRACTITIONER

## 2023-06-26 PROCEDURE — 1159F MED LIST DOCD IN RCRD: CPT | Mod: CPTII,S$GLB,, | Performed by: NURSE PRACTITIONER

## 2023-06-26 PROCEDURE — 3008F BODY MASS INDEX DOCD: CPT | Mod: CPTII,S$GLB,, | Performed by: NURSE PRACTITIONER

## 2023-06-26 NOTE — PROGRESS NOTES
HEMATOLOGY FOLLOW UP CONSULTATION NOTE    Patient ID: Erica Law is a 43 y.o. female.    Chief Complaint: Anemia    HPI:  Patient is a 40-year-old female with past medical history of DM, Obesity, anxiety, HTN, Hypothyroidism, positive SHELLY, Arthritis who is referred by her primary care provider for evaluation of anemia.  Patient voices no acute complaints and denies any history of abnormal bleeding, blood in the stools.      Labs:  Lab Results   Component Value Date    WBC 12.68 (H) 06/21/2023    HGB 7.4 (L) 06/21/2023    HCT 26.0 (L) 06/21/2023    MCV 82.3 06/21/2023    LABPLAT 296 06/30/2021      Platelets   Date Value Ref Range Status   06/30/2021 296 142 - 424 10*3/uL Final     CMP  Sodium   Date Value Ref Range Status   05/18/2023 134 (L) 136 - 145 mmol/L Final     Potassium   Date Value Ref Range Status   05/18/2023 4.4 3.5 - 5.1 mmol/L Final     Chloride   Date Value Ref Range Status   05/18/2023 103 98 - 107 mmol/L Final     CO2   Date Value Ref Range Status   05/18/2023 19 (L) 22 - 29 mmol/L Final     Glucose   Date Value Ref Range Status   06/30/2021 131 (H) 70 - 110 mg/dL Final     BUN   Date Value Ref Range Status   05/18/2023 16.1 6 - 20 mg/dL Final     Creatinine   Date Value Ref Range Status   05/18/2023 1.29 (H) 0.50 - 0.90 mg/dL Final     Comment:     Recommend repeat creatinine within 90 days.     Calcium   Date Value Ref Range Status   05/18/2023 9.6 8.6 - 10.2 mg/dL Final     Total Protein   Date Value Ref Range Status   07/01/2020 10.2 (H) 6.0 - 8.4 g/dL Final     Albumin   Date Value Ref Range Status   05/18/2023 3.6 3.5 - 5.2 g/dL Final     Total Bilirubin   Date Value Ref Range Status   07/01/2020 0.4 0.1 - 1.0 mg/dL Final     Comment:     For infants and newborns, interpretation of results should be based  on gestational age, weight and in agreement with clinical  observations.  Premature Infant recommended reference ranges:  Up to 24 hours.............<8.0 mg/dL  Up to 48  hours............<12.0 mg/dL  3-5 days..................<15.0 mg/dL  6-29 days.................<15.0 mg/dL       Alkaline Phosphatase   Date Value Ref Range Status   2020 76 55 - 135 U/L Final     AST   Date Value Ref Range Status   2023 14 0 - 32 U/L Final     ALT   Date Value Ref Range Status   2020 12 10 - 44 U/L Final     Anion Gap   Date Value Ref Range Status   2023 12.0 8.0 - 17.0 mmol/L Final     Comment:     NOTE  Testing performed at:  [facility], [address] [place] [address] CLIA #:[id number]       eGFR if    Date Value Ref Range Status   2020 >60.0 >60 mL/min/1.73 m^2 Final     eGFR if non    Date Value Ref Range Status   2020 >60.0 >60 mL/min/1.73 m^2 Final     Comment:     Calculation used to obtain the estimated glomerular filtration  rate (eGFR) is the CKD-EPI equation.                   Past Medical History:   Diagnosis Date    Anxiety     Diabetes mellitus     Hydradenitis     Hypertension     Staph infection 2001    Thyroid disease        Family History   Problem Relation Age of Onset    Diabetes Mellitus Mother     Cancer Maternal Grandmother     Cancer Maternal Grandfather        Social History     Socioeconomic History    Marital status: Single    Number of children: 1   Tobacco Use    Smoking status: Every Day     Types: Vaping with nicotine    Smokeless tobacco: Never   Substance and Sexual Activity    Alcohol use: Not Currently    Drug use: Never    Sexual activity: Not Currently     Partners: Male     Birth control/protection: Implant         Past Surgical History:   Procedure Laterality Date    ABCESS DRAINAGE      from the breast     BREAST CYST EXCISION       SECTION             Review of systems:    Review of Systems   Constitutional:  Negative for activity change, appetite change, chills, diaphoresis, fatigue and unexpected weight change.   HENT:  Negative for congestion, facial  swelling, hearing loss, mouth sores, trouble swallowing and voice change.    Eyes:  Negative for photophobia, pain, discharge and itching.   Respiratory:  Negative for apnea, cough, choking, chest tightness and shortness of breath.    Cardiovascular:  Negative for chest pain, palpitations and leg swelling.   Gastrointestinal:  Negative for abdominal distention, abdominal pain, anal bleeding and blood in stool.   Endocrine: Negative for cold intolerance, heat intolerance, polydipsia and polyphagia.   Genitourinary:  Negative for difficulty urinating, dysuria, flank pain and hematuria.   Musculoskeletal:  Negative for arthralgias, back pain, joint swelling, myalgias, neck pain and neck stiffness.   Skin:  Negative for color change, pallor and wound.   Allergic/Immunologic: Negative for environmental allergies, food allergies and immunocompromised state.   Neurological:  Negative for dizziness, seizures, facial asymmetry, speech difficulty, light-headedness, numbness and headaches.   Hematological:  Negative for adenopathy. Does not bruise/bleed easily.   Psychiatric/Behavioral:  Negative for agitation, behavioral problems, confusion, decreased concentration and sleep disturbance.        Physical Exam  Vitals and nursing note reviewed.   Constitutional:       General: She is not in acute distress.     Appearance: Normal appearance. She is not ill-appearing.   HENT:      Head: Normocephalic and atraumatic.      Nose: No congestion or rhinorrhea.   Eyes:      General: No scleral icterus.     Extraocular Movements: Extraocular movements intact.      Pupils: Pupils are equal, round, and reactive to light.   Cardiovascular:      Rate and Rhythm: Normal rate and regular rhythm.      Pulses: Normal pulses.      Heart sounds: Normal heart sounds. No murmur heard.    No gallop.   Pulmonary:      Effort: Pulmonary effort is normal. No respiratory distress.      Breath sounds: Normal breath sounds. No stridor. No wheezing or  rhonchi.   Abdominal:      General: Bowel sounds are normal. There is no distension.      Palpations: There is no mass.      Tenderness: There is no abdominal tenderness. There is no guarding.   Musculoskeletal:         General: No swelling, tenderness, deformity or signs of injury. Normal range of motion.      Cervical back: Normal range of motion and neck supple. No rigidity. No muscular tenderness.      Right lower leg: No edema.      Left lower leg: No edema.   Skin:     General: Skin is warm.      Coloration: Skin is not jaundiced or pale.      Findings: No bruising or lesion.   Neurological:      General: No focal deficit present.      Mental Status: She is alert and oriented to person, place, and time.      Cranial Nerves: No cranial nerve deficit.      Sensory: No sensory deficit.      Motor: No weakness.      Gait: Gait normal.   Psychiatric:         Mood and Affect: Mood normal.         Behavior: Behavior normal.         Thought Content: Thought content normal.     There were no vitals filed for this visit.    There is no height or weight on file to calculate BSA.        Assessment/Plan:      1. Normochromic Anemia:     == Normocytic normochromic noted on labs done 3 weeks back.  Will obtain repeat CBC and also obtain iron profile and ferritin.  Discussed with her the etiology for normocytic anemia in the young females. Also discussed with her about anemia of inflammation. Looking at her records this could be due to inflammatory process due to her multiple medical co-morbidities. Would recommend to check B12 in future as well  == In the interim would recommend her to take PO iron supplementation and to take Vit C to enhance absorption but I feel we are likely looking at anemia of chronic disease looking at labs we have so far. Discussed this with patient who voices understanding.  == 9/23/2021:  Improvement in hemoglobin noted from 8.6 to 10.9. She has been taking Iron with Vit C and that has helped with  her improved counts. Advised to continue at this time. She also has hypothyroidism and her last TSH levels are borderline low. We will also check her b12 levels at the return visit for completion. But I feel all these factors likely contributing to her anemia.  == 8/22/2022:  Labs from today showed stable hemoglobin at 10.4. Normal iron and TIBC along with normal B12 level.  Reports she has been taking her thyroid supplementation regularly.  And I would recommend that she continues to take p.o. iron supplementation along with vitamin-C.  Will defer IV iron supplementation at this time. Elevated platelets, mild and secondary to anemia.  == 11/15/22: doing well with no new complaints, labs reviewed with improvement in WBCs as well as hemoglobin.  Patient reports tachycardia over last several weeks and is planning to follow-up with PCP in near future  ==06/26/2023: Hgb 7.4, patient reports bleeding from hidrinatis wounds to axilla as well as groin.  She is scheduled for skin graftin procedure in 1 week with Dr. Silver at [facility].  She previously had blood transfusion in April 2023.  Considered IV iron, however given she has appt for procdure in 1 week will admin 2 units prbs.  Will see pt back in 3 months    2. Leucocytosis:    == Seems to be reactive. Normal differential. Continue observation. If continued elevation in future would obtain Flow cytometry.  ==  8/22/22:  Elevation noted. Reactive vs inflammatory.  She is being treated for hidradenitis suppurativa with antibiotics so likely cause is reactive vs inflammatory. Will obtain flow cytometry if WBC continue to increase  == 11/15/22: recent hospitalization for hidradenitis approximately 1 month ago, treated with IV ABX and fluids    3. Preventive Medicine:    == Reports being upto date. Follows closely with PCP      RTC in 3 months cbc cmp iron stuides    Total time spent in counseling and discussion about further management options including relevant lab work,  treatment,  prognosis, medications and intended side effects was more than 25 minutes. More than 50 % of the time was spent in counseling and coordination of care.  This includes face to face time and non-face to face time preparing to see the patient (eg, review of tests), Obtaining and/or reviewing separately obtained history, Documenting clinical information in the electronic or other health record, Independently interpreting resultsand communicating results to the patient/family/caregiver, or Care coordination.

## 2023-07-17 ENCOUNTER — OFFICE VISIT (OUTPATIENT)
Dept: FAMILY MEDICINE | Facility: CLINIC | Age: 43
End: 2023-07-17
Payer: MEDICARE

## 2023-07-17 VITALS
HEIGHT: 60 IN | DIASTOLIC BLOOD PRESSURE: 72 MMHG | OXYGEN SATURATION: 100 % | WEIGHT: 224 LBS | HEART RATE: 109 BPM | SYSTOLIC BLOOD PRESSURE: 131 MMHG | BODY MASS INDEX: 43.98 KG/M2 | RESPIRATION RATE: 15 BRPM

## 2023-07-17 DIAGNOSIS — E11.9 TYPE 2 DIABETES MELLITUS WITHOUT COMPLICATION, WITHOUT LONG-TERM CURRENT USE OF INSULIN: ICD-10-CM

## 2023-07-17 DIAGNOSIS — M79.672 LEFT FOOT PAIN: Primary | ICD-10-CM

## 2023-07-17 PROCEDURE — 3075F PR MOST RECENT SYSTOLIC BLOOD PRESS GE 130-139MM HG: ICD-10-PCS | Mod: CPTII,S$GLB,, | Performed by: PHYSICIAN ASSISTANT

## 2023-07-17 PROCEDURE — 3008F BODY MASS INDEX DOCD: CPT | Mod: CPTII,S$GLB,, | Performed by: PHYSICIAN ASSISTANT

## 2023-07-17 PROCEDURE — 3078F DIAST BP <80 MM HG: CPT | Mod: CPTII,S$GLB,, | Performed by: PHYSICIAN ASSISTANT

## 2023-07-17 PROCEDURE — G0179 PR HOME HEALTH MD RECERTIFICATION: ICD-10-PCS | Mod: ,,, | Performed by: FAMILY MEDICINE

## 2023-07-17 PROCEDURE — 1159F PR MEDICATION LIST DOCUMENTED IN MEDICAL RECORD: ICD-10-PCS | Mod: CPTII,S$GLB,, | Performed by: PHYSICIAN ASSISTANT

## 2023-07-17 PROCEDURE — 3078F PR MOST RECENT DIASTOLIC BLOOD PRESSURE < 80 MM HG: ICD-10-PCS | Mod: CPTII,S$GLB,, | Performed by: PHYSICIAN ASSISTANT

## 2023-07-17 PROCEDURE — 1159F MED LIST DOCD IN RCRD: CPT | Mod: CPTII,S$GLB,, | Performed by: PHYSICIAN ASSISTANT

## 2023-07-17 PROCEDURE — 99213 PR OFFICE/OUTPT VISIT, EST, LEVL III, 20-29 MIN: ICD-10-PCS | Mod: S$GLB,,, | Performed by: PHYSICIAN ASSISTANT

## 2023-07-17 PROCEDURE — G0179 MD RECERTIFICATION HHA PT: HCPCS | Mod: ,,, | Performed by: FAMILY MEDICINE

## 2023-07-17 PROCEDURE — 3044F HG A1C LEVEL LT 7.0%: CPT | Mod: CPTII,S$GLB,, | Performed by: PHYSICIAN ASSISTANT

## 2023-07-17 PROCEDURE — 4010F PR ACE/ARB THEARPY RXD/TAKEN: ICD-10-PCS | Mod: CPTII,S$GLB,, | Performed by: PHYSICIAN ASSISTANT

## 2023-07-17 PROCEDURE — 4010F ACE/ARB THERAPY RXD/TAKEN: CPT | Mod: CPTII,S$GLB,, | Performed by: PHYSICIAN ASSISTANT

## 2023-07-17 PROCEDURE — 3044F PR MOST RECENT HEMOGLOBIN A1C LEVEL <7.0%: ICD-10-PCS | Mod: CPTII,S$GLB,, | Performed by: PHYSICIAN ASSISTANT

## 2023-07-17 PROCEDURE — 99213 OFFICE O/P EST LOW 20 MIN: CPT | Mod: S$GLB,,, | Performed by: PHYSICIAN ASSISTANT

## 2023-07-17 PROCEDURE — 3008F PR BODY MASS INDEX (BMI) DOCUMENTED: ICD-10-PCS | Mod: CPTII,S$GLB,, | Performed by: PHYSICIAN ASSISTANT

## 2023-07-17 PROCEDURE — 3075F SYST BP GE 130 - 139MM HG: CPT | Mod: CPTII,S$GLB,, | Performed by: PHYSICIAN ASSISTANT

## 2023-07-17 NOTE — PROGRESS NOTES
Subjective:      Patient ID: Erica Law is a 43 y.o. female.    Chief Complaint: Follow-up and Medication Refill      HPI  Pt is here today with a cc of left foot pain x2-3 weeks.  Improved over the past 2-3 days.  Worse with walking, no pain with rest .  No know injury or wound.      2nd complaint  Taking mounjaro for IR and DM- states she has lost 4 lbs in the past few mo.  Would like to consider increasing dose.  Tolerating med well, no med SE     Review of Systems   Constitutional:  Negative for activity change, appetite change, chills, diaphoresis, fatigue, fever and unexpected weight change.   Respiratory:  Negative for cough, chest tightness and shortness of breath.    Cardiovascular:  Negative for chest pain, palpitations, leg swelling and claudication.   Gastrointestinal:  Negative for nausea and vomiting.   Musculoskeletal:  Positive for arthralgias. Negative for back pain, gait problem, joint swelling, leg pain, myalgias, neck pain, neck stiffness and joint deformity.   Neurological:  Negative for dizziness, vertigo, syncope, weakness, light-headedness, numbness and headaches.     Medication List with Changes/Refills   New Medications    TIRZEPATIDE 7.5 MG/0.5 ML PNIJ    Inject 7.5 mg into the skin every 7 days.   Current Medications    ATORVASTATIN (LIPITOR) 40 MG TABLET    TAKE 1 TABLET BY MOUTH EVERY DAY    CIPROFLOXACIN HCL (CIPRO) 500 MG TABLET    Take 500 mg by mouth 2 (two) times a day.    ESCITALOPRAM OXALATE (LEXAPRO) 10 MG TABLET    TAKE 1 TABLET BY MOUTH EVERY DAY    GABAPENTIN (NEURONTIN) 300 MG CAPSULE    TAKE 1 CAPSULE BY MOUTH THREE TIMES A DAY    HYDROCODONE-ACETAMINOPHEN (NORCO) 5-325 MG PER TABLET    Take 1 tablet by mouth every 4 to 6 hours as needed.    IRON 100 PLUS TAB    TAKE 1 TABLET BY MOUTH TWICE A DAY    LEVOTHYROXINE (SYNTHROID) 50 MCG TABLET    TAKE 1 TABLET BY MOUTH EVERY DAY BEFORE BREAKFAST    NIFEDIPINE (ADALAT CC) 60 MG TBSR    TAKE 1 TABLET BY MOUTH EVERY DAY    Discontinued Medications    TIRZEPATIDE (MOUNJARO) 5 MG/0.5 ML PNIJ    Inject 5 mg into the skin every 7 days.        Objective:     Vitals:    07/17/23 1500   BP: 131/72   Pulse: 109   Resp: 15   SpO2: 100%   Weight: 101.6 kg (224 lb)   Height: 5' (1.524 m)        Physical Exam  Constitutional:       Appearance: Normal appearance. She is normal weight.   HENT:      Head: Normocephalic and atraumatic.      Nose: Nose normal.   Eyes:      Conjunctiva/sclera: Conjunctivae normal.      Comments: Eyes tracking normal on exam    Cardiovascular:      Rate and Rhythm: Normal rate and regular rhythm.      Pulses: Normal pulses.      Heart sounds: Normal heart sounds. No murmur heard.    No friction rub. No gallop.   Pulmonary:      Effort: Pulmonary effort is normal. No respiratory distress.      Breath sounds: Normal breath sounds. No stridor. No wheezing, rhonchi or rales.   Musculoskeletal:      Right lower leg: No edema.      Left lower leg: No edema.      Comments: Left foot-  Mildly TTP at 5th DIPJ and PIPJ  Generalized foot swelling BL chronic  No redness, no wound, no FB.  Plantar foot unremarkable      Skin:     General: Skin is warm and dry.      Capillary Refill: Capillary refill takes less than 2 seconds.   Neurological:      Mental Status: She is alert and oriented to person, place, and time.   Psychiatric:         Mood and Affect: Mood normal.         Behavior: Behavior normal.         Thought Content: Thought content normal.         Judgment: Judgment normal.          Assessment & Plan:     Left foot pain  -     X-Ray Foot Complete Left; Future; Expected date: 07/17/2023    Type 2 diabetes mellitus without complication, without long-term current use of insulin  -     tirzepatide 7.5 mg/0.5 mL PnIj; Inject 7.5 mg into the skin every 7 days.  Dispense: 4 pen ; Refill: 2           Return to clinic if not improving or worsening at anytime       -Patient instructed that our office calls back on all labs and  imaging within 1 week of receiving the results. Patient instructed to reach out to our office if they have not heard from us so that we can request the results from the lab/imaging center           Pushpa Kinsey PA-C

## 2023-08-01 ENCOUNTER — EXTERNAL HOME HEALTH (OUTPATIENT)
Dept: HOME HEALTH SERVICES | Facility: HOSPITAL | Age: 43
End: 2023-08-01
Payer: MEDICARE

## 2023-08-09 ENCOUNTER — OFFICE VISIT (OUTPATIENT)
Dept: FAMILY MEDICINE | Facility: CLINIC | Age: 43
End: 2023-08-09
Payer: MEDICARE

## 2023-08-09 VITALS
DIASTOLIC BLOOD PRESSURE: 57 MMHG | HEART RATE: 90 BPM | HEIGHT: 60 IN | SYSTOLIC BLOOD PRESSURE: 100 MMHG | BODY MASS INDEX: 42.6 KG/M2 | WEIGHT: 217 LBS | OXYGEN SATURATION: 99 %

## 2023-08-09 DIAGNOSIS — E11.69 TYPE 2 DIABETES MELLITUS WITH OTHER SPECIFIED COMPLICATION, WITHOUT LONG-TERM CURRENT USE OF INSULIN: Primary | ICD-10-CM

## 2023-08-09 PROCEDURE — 3074F SYST BP LT 130 MM HG: CPT | Mod: CPTII,S$GLB,, | Performed by: PHYSICIAN ASSISTANT

## 2023-08-09 PROCEDURE — 3008F BODY MASS INDEX DOCD: CPT | Mod: CPTII,S$GLB,, | Performed by: PHYSICIAN ASSISTANT

## 2023-08-09 PROCEDURE — 1159F PR MEDICATION LIST DOCUMENTED IN MEDICAL RECORD: ICD-10-PCS | Mod: CPTII,S$GLB,, | Performed by: PHYSICIAN ASSISTANT

## 2023-08-09 PROCEDURE — 1159F MED LIST DOCD IN RCRD: CPT | Mod: CPTII,S$GLB,, | Performed by: PHYSICIAN ASSISTANT

## 2023-08-09 PROCEDURE — 3078F DIAST BP <80 MM HG: CPT | Mod: CPTII,S$GLB,, | Performed by: PHYSICIAN ASSISTANT

## 2023-08-09 PROCEDURE — 3008F PR BODY MASS INDEX (BMI) DOCUMENTED: ICD-10-PCS | Mod: CPTII,S$GLB,, | Performed by: PHYSICIAN ASSISTANT

## 2023-08-09 PROCEDURE — 3044F HG A1C LEVEL LT 7.0%: CPT | Mod: CPTII,S$GLB,, | Performed by: PHYSICIAN ASSISTANT

## 2023-08-09 PROCEDURE — 4010F ACE/ARB THERAPY RXD/TAKEN: CPT | Mod: CPTII,S$GLB,, | Performed by: PHYSICIAN ASSISTANT

## 2023-08-09 PROCEDURE — 3078F PR MOST RECENT DIASTOLIC BLOOD PRESSURE < 80 MM HG: ICD-10-PCS | Mod: CPTII,S$GLB,, | Performed by: PHYSICIAN ASSISTANT

## 2023-08-09 PROCEDURE — 3074F PR MOST RECENT SYSTOLIC BLOOD PRESSURE < 130 MM HG: ICD-10-PCS | Mod: CPTII,S$GLB,, | Performed by: PHYSICIAN ASSISTANT

## 2023-08-09 PROCEDURE — 99213 OFFICE O/P EST LOW 20 MIN: CPT | Mod: S$GLB,,, | Performed by: PHYSICIAN ASSISTANT

## 2023-08-09 PROCEDURE — 99213 PR OFFICE/OUTPT VISIT, EST, LEVL III, 20-29 MIN: ICD-10-PCS | Mod: S$GLB,,, | Performed by: PHYSICIAN ASSISTANT

## 2023-08-09 PROCEDURE — 3044F PR MOST RECENT HEMOGLOBIN A1C LEVEL <7.0%: ICD-10-PCS | Mod: CPTII,S$GLB,, | Performed by: PHYSICIAN ASSISTANT

## 2023-08-09 PROCEDURE — 4010F PR ACE/ARB THEARPY RXD/TAKEN: ICD-10-PCS | Mod: CPTII,S$GLB,, | Performed by: PHYSICIAN ASSISTANT

## 2023-08-09 NOTE — PROGRESS NOTES
Subjective:      Patient ID: Erica Law is a 43 y.o. female.    Chief Complaint: Follow-up (1 month F/U , med refill )      HPI  Patient is here to follow-up on new medicine mounjaro. She is tolerating this med will and denies any med SE    Review of Systems   Constitutional:  Negative for activity change, appetite change, chills, diaphoresis, fatigue and unexpected weight change.   Eyes:  Negative for visual disturbance.   Respiratory:  Negative for cough, chest tightness, shortness of breath and wheezing.    Cardiovascular:  Negative for chest pain, palpitations and leg swelling.   Gastrointestinal:  Negative for abdominal pain, constipation, nausea and vomiting.   Neurological:  Negative for dizziness, vertigo, tremors, syncope, weakness, light-headedness, numbness and headaches.   Psychiatric/Behavioral:  Negative for agitation, behavioral problems, confusion, decreased concentration, dysphoric mood, hallucinations, self-injury, sleep disturbance and suicidal ideas. The patient is not nervous/anxious and is not hyperactive.        Medication List with Changes/Refills   New Medications    TIRZEPATIDE 7.5 MG/0.5 ML PNIJ    Inject 7.5 mg into the skin every 7 days.   Current Medications    ATORVASTATIN (LIPITOR) 40 MG TABLET    TAKE 1 TABLET BY MOUTH EVERY DAY    CIPROFLOXACIN HCL (CIPRO) 500 MG TABLET    Take 500 mg by mouth 2 (two) times a day.    ESCITALOPRAM OXALATE (LEXAPRO) 10 MG TABLET    TAKE 1 TABLET BY MOUTH EVERY DAY    GABAPENTIN (NEURONTIN) 300 MG CAPSULE    TAKE 1 CAPSULE BY MOUTH THREE TIMES A DAY    HYDROCODONE-ACETAMINOPHEN (NORCO) 5-325 MG PER TABLET    Take 1 tablet by mouth every 4 to 6 hours as needed.    IRON 100 PLUS TAB    TAKE 1 TABLET BY MOUTH TWICE A DAY    LEVOTHYROXINE (SYNTHROID) 50 MCG TABLET    TAKE 1 TABLET BY MOUTH EVERY DAY BEFORE BREAKFAST    NIFEDIPINE (ADALAT CC) 60 MG TBSR    TAKE 1 TABLET BY MOUTH EVERY DAY    TIRZEPATIDE 7.5 MG/0.5 ML PNIJ    Inject 7.5 mg into the skin  every 7 days.        Objective:     Vitals:    08/09/23 1120   BP: (!) 100/57   Pulse: 90   SpO2: 99%   Weight: 98.4 kg (217 lb)   Height: 5' (1.524 m)        Physical Exam  Constitutional:       Appearance: Normal appearance. She is normal weight.   HENT:      Head: Normocephalic and atraumatic.      Nose: Nose normal.   Eyes:      Conjunctiva/sclera: Conjunctivae normal.      Comments: Eyes tracking normal on exam    Cardiovascular:      Rate and Rhythm: Normal rate and regular rhythm.      Pulses: Normal pulses.      Heart sounds: Normal heart sounds. No murmur heard.     No friction rub. No gallop.   Pulmonary:      Effort: Pulmonary effort is normal. No respiratory distress.      Breath sounds: Normal breath sounds. No stridor. No wheezing, rhonchi or rales.   Musculoskeletal:      Right lower leg: No edema.      Left lower leg: No edema.      Comments: Moves all extremities well and with good control  Normal gait observed      Skin:     General: Skin is warm and dry.      Capillary Refill: Capillary refill takes less than 2 seconds.   Neurological:      Mental Status: She is alert and oriented to person, place, and time.   Psychiatric:         Mood and Affect: Mood normal.         Behavior: Behavior normal.         Thought Content: Thought content normal.         Judgment: Judgment normal.            Assessment & Plan:     Type 2 diabetes mellitus with other specified complication, without long-term current use of insulin  -     tirzepatide 7.5 mg/0.5 mL PnIj; Inject 7.5 mg into the skin every 7 days.  Dispense: 4 pen ; Refill: 2           3 mo followup       -Patient instructed that our office calls back on all labs and imaging within 1 week of receiving the results. Patient instructed to reach out to our office if they have not heard from us so that we can request the results from the lab/imaging center           Pushpa Kinsey PA-C

## 2023-08-10 ENCOUNTER — DOCUMENT SCAN (OUTPATIENT)
Dept: HOME HEALTH SERVICES | Facility: HOSPITAL | Age: 43
End: 2023-08-10
Payer: MEDICARE

## 2023-08-15 DIAGNOSIS — B37.31 CANDIDA VAGINITIS: ICD-10-CM

## 2023-08-15 RX ORDER — FLUCONAZOLE 150 MG/1
TABLET ORAL
Qty: 3 TABLET | Refills: 0 | Status: SHIPPED | OUTPATIENT
Start: 2023-08-15 | End: 2024-01-10

## 2023-08-29 ENCOUNTER — DOCUMENT SCAN (OUTPATIENT)
Dept: HOME HEALTH SERVICES | Facility: HOSPITAL | Age: 43
End: 2023-08-29
Payer: MEDICARE

## 2023-09-15 PROCEDURE — G0179 MD RECERTIFICATION HHA PT: HCPCS | Mod: ,,, | Performed by: FAMILY MEDICINE

## 2023-09-15 PROCEDURE — G0179 PR HOME HEALTH MD RECERTIFICATION: ICD-10-PCS | Mod: ,,, | Performed by: FAMILY MEDICINE

## 2023-09-22 DIAGNOSIS — D64.9 ANEMIA, UNSPECIFIED TYPE: Primary | ICD-10-CM

## 2023-10-03 ENCOUNTER — EXTERNAL HOME HEALTH (OUTPATIENT)
Dept: HOME HEALTH SERVICES | Facility: HOSPITAL | Age: 43
End: 2023-10-03
Payer: MEDICARE

## 2023-10-06 DIAGNOSIS — E11.9 TYPE 2 DIABETES MELLITUS WITHOUT COMPLICATION, WITHOUT LONG-TERM CURRENT USE OF INSULIN: ICD-10-CM

## 2023-10-09 RX ORDER — TIRZEPATIDE 7.5 MG/.5ML
INJECTION, SOLUTION SUBCUTANEOUS
Qty: 4 PEN | Refills: 2 | Status: SHIPPED | OUTPATIENT
Start: 2023-10-09 | End: 2023-11-08

## 2023-10-20 DIAGNOSIS — E03.9 HYPOTHYROIDISM, UNSPECIFIED TYPE: ICD-10-CM

## 2023-10-23 RX ORDER — LEVOTHYROXINE SODIUM 50 UG/1
TABLET ORAL
Qty: 30 TABLET | Refills: 2 | Status: SHIPPED | OUTPATIENT
Start: 2023-10-23 | End: 2024-01-11

## 2023-11-08 ENCOUNTER — OFFICE VISIT (OUTPATIENT)
Dept: FAMILY MEDICINE | Facility: CLINIC | Age: 43
End: 2023-11-08
Payer: MEDICARE

## 2023-11-08 VITALS
DIASTOLIC BLOOD PRESSURE: 80 MMHG | OXYGEN SATURATION: 99 % | BODY MASS INDEX: 43.59 KG/M2 | HEART RATE: 92 BPM | WEIGHT: 222 LBS | RESPIRATION RATE: 16 BRPM | HEIGHT: 60 IN | SYSTOLIC BLOOD PRESSURE: 143 MMHG

## 2023-11-08 DIAGNOSIS — E78.5 HYPERLIPIDEMIA, UNSPECIFIED HYPERLIPIDEMIA TYPE: ICD-10-CM

## 2023-11-08 DIAGNOSIS — I10 PRIMARY HYPERTENSION: ICD-10-CM

## 2023-11-08 DIAGNOSIS — E03.9 HYPOTHYROIDISM, UNSPECIFIED TYPE: ICD-10-CM

## 2023-11-08 DIAGNOSIS — E11.69 TYPE 2 DIABETES MELLITUS WITH OTHER SPECIFIED COMPLICATION, WITHOUT LONG-TERM CURRENT USE OF INSULIN: ICD-10-CM

## 2023-11-08 DIAGNOSIS — N28.9 RENAL INSUFFICIENCY: Primary | ICD-10-CM

## 2023-11-08 PROCEDURE — 3079F PR MOST RECENT DIASTOLIC BLOOD PRESSURE 80-89 MM HG: ICD-10-PCS | Mod: CPTII,S$GLB,, | Performed by: PHYSICIAN ASSISTANT

## 2023-11-08 PROCEDURE — 3044F PR MOST RECENT HEMOGLOBIN A1C LEVEL <7.0%: ICD-10-PCS | Mod: CPTII,S$GLB,, | Performed by: PHYSICIAN ASSISTANT

## 2023-11-08 PROCEDURE — 3079F DIAST BP 80-89 MM HG: CPT | Mod: CPTII,S$GLB,, | Performed by: PHYSICIAN ASSISTANT

## 2023-11-08 PROCEDURE — 99214 OFFICE O/P EST MOD 30 MIN: CPT | Mod: S$GLB,,, | Performed by: PHYSICIAN ASSISTANT

## 2023-11-08 PROCEDURE — 1159F MED LIST DOCD IN RCRD: CPT | Mod: CPTII,S$GLB,, | Performed by: PHYSICIAN ASSISTANT

## 2023-11-08 PROCEDURE — 3008F BODY MASS INDEX DOCD: CPT | Mod: CPTII,S$GLB,, | Performed by: PHYSICIAN ASSISTANT

## 2023-11-08 PROCEDURE — 3077F PR MOST RECENT SYSTOLIC BLOOD PRESSURE >= 140 MM HG: ICD-10-PCS | Mod: CPTII,S$GLB,, | Performed by: PHYSICIAN ASSISTANT

## 2023-11-08 PROCEDURE — 4010F PR ACE/ARB THEARPY RXD/TAKEN: ICD-10-PCS | Mod: CPTII,S$GLB,, | Performed by: PHYSICIAN ASSISTANT

## 2023-11-08 PROCEDURE — 3008F PR BODY MASS INDEX (BMI) DOCUMENTED: ICD-10-PCS | Mod: CPTII,S$GLB,, | Performed by: PHYSICIAN ASSISTANT

## 2023-11-08 PROCEDURE — 99214 PR OFFICE/OUTPT VISIT, EST, LEVL IV, 30-39 MIN: ICD-10-PCS | Mod: S$GLB,,, | Performed by: PHYSICIAN ASSISTANT

## 2023-11-08 PROCEDURE — 3077F SYST BP >= 140 MM HG: CPT | Mod: CPTII,S$GLB,, | Performed by: PHYSICIAN ASSISTANT

## 2023-11-08 PROCEDURE — 4010F ACE/ARB THERAPY RXD/TAKEN: CPT | Mod: CPTII,S$GLB,, | Performed by: PHYSICIAN ASSISTANT

## 2023-11-08 PROCEDURE — 3044F HG A1C LEVEL LT 7.0%: CPT | Mod: CPTII,S$GLB,, | Performed by: PHYSICIAN ASSISTANT

## 2023-11-08 PROCEDURE — 1159F PR MEDICATION LIST DOCUMENTED IN MEDICAL RECORD: ICD-10-PCS | Mod: CPTII,S$GLB,, | Performed by: PHYSICIAN ASSISTANT

## 2023-11-08 NOTE — PROGRESS NOTES
Subjective:      Patient ID: Erica Law is a 43 y.o. female.    Chief Complaint: Follow-up      HPI  Pt is here today for chronic followup     Due for labs, drawing today    DM2- on mounjaro , denies s/s of highs or lows.  A1c 5 in clinic today.   HTN- mildly elevated today, she did not take meds this morning.  Educated on better med adherence   Hypothyroidism- on synthroid, due for labs   Renal insuff- following , labs today   HLD- on statin       Review of Systems   Constitutional:  Negative for activity change, appetite change, chills, fatigue and fever.   HENT:  Negative for nasal congestion, facial swelling and rhinorrhea.    Eyes:  Negative for pain and visual disturbance.   Respiratory:  Negative for cough, chest tightness and shortness of breath.    Cardiovascular:  Negative for chest pain, palpitations, leg swelling and claudication.   Gastrointestinal:  Negative for abdominal distention, abdominal pain, anal bleeding, blood in stool, constipation, diarrhea, nausea, vomiting and reflux.   Endocrine: Negative for polydipsia and polyuria.   Genitourinary:  Negative for difficulty urinating, flank pain and frequency.   Musculoskeletal:  Negative for arthralgias and back pain.   Neurological:  Negative for dizziness, weakness, light-headedness, numbness and headaches.   Psychiatric/Behavioral:  Negative for self-injury, sleep disturbance and suicidal ideas. The patient is not nervous/anxious.        Medication List with Changes/Refills   New Medications    TIRZEPATIDE 10 MG/0.5 ML PNIJ    Inject 10 mg into the skin every 7 days.   Current Medications    ATORVASTATIN (LIPITOR) 40 MG TABLET    TAKE 1 TABLET BY MOUTH EVERY DAY    ESCITALOPRAM OXALATE (LEXAPRO) 10 MG TABLET    TAKE 1 TABLET BY MOUTH EVERY DAY    FLUCONAZOLE (DIFLUCAN) 150 MG TAB    TAKE 1 TABLET BY MOUTH ONCE A WEEK FOR 21 DAYS    GABAPENTIN (NEURONTIN) 300 MG CAPSULE    TAKE 1 CAPSULE BY MOUTH THREE TIMES A DAY    HYDROCODONE-ACETAMINOPHEN  (NORCO) 5-325 MG PER TABLET    Take 1 tablet by mouth every 4 to 6 hours as needed.    IRON 100 PLUS TAB    TAKE 1 TABLET BY MOUTH TWICE A DAY    LEVOTHYROXINE (SYNTHROID) 50 MCG TABLET    TAKE 1 TABLET BY MOUTH EVERY DAY BEFORE BREAKFAST    NIFEDIPINE (ADALAT CC) 60 MG TBSR    TAKE 1 TABLET BY MOUTH EVERY DAY   Discontinued Medications    CIPROFLOXACIN HCL (CIPRO) 500 MG TABLET    Take 500 mg by mouth 2 (two) times a day.    TIRZEPATIDE (MOUNJARO) 7.5 MG/0.5 ML PNIJ    INJECT 7.5 MG SUBCUTANEOUSLY EVERY 7 DAYS    TIRZEPATIDE 7.5 MG/0.5 ML PNIJ    Inject 7.5 mg into the skin every 7 days.        Objective:     Vitals:    11/08/23 0908   BP: (!) 143/80   Pulse: 92   Resp: 16   SpO2: 99%   Weight: 100.7 kg (222 lb)   Height: 5' (1.524 m)        Physical Exam  Constitutional:       Appearance: Normal appearance. She is obese.   HENT:      Head: Normocephalic and atraumatic.      Nose: Nose normal.      Mouth/Throat:      Mouth: Mucous membranes are moist.      Pharynx: No oropharyngeal exudate or posterior oropharyngeal erythema.   Eyes:      General: No scleral icterus.        Right eye: No discharge.         Left eye: No discharge.      Conjunctiva/sclera: Conjunctivae normal.   Cardiovascular:      Rate and Rhythm: Normal rate and regular rhythm.      Pulses: Normal pulses.      Heart sounds: Normal heart sounds. No murmur heard.     No friction rub. No gallop. No S3 or S4 sounds.   Pulmonary:      Effort: Pulmonary effort is normal. No respiratory distress.      Breath sounds: Normal breath sounds. No wheezing, rhonchi or rales.   Musculoskeletal:      Cervical back: Normal range of motion. No rigidity or tenderness.      Right lower leg: No edema.      Left lower leg: No edema.      Comments: Moves all extremities well and with good control    Skin:     Capillary Refill: Capillary refill takes less than 2 seconds.      Findings: No rash.   Neurological:      Mental Status: She is alert and oriented to person,  place, and time.      Motor: No weakness.      Gait: Gait normal.   Psychiatric:         Mood and Affect: Mood normal.         Behavior: Behavior normal.         Judgment: Judgment normal.            Assessment & Plan:     Renal insufficiency  -     Microalbumin/creatinine urine ratio; Future  -     Comprehensive Metabolic Panel; Future; Expected date: 11/08/2023    Type 2 diabetes mellitus with other specified complication, without long-term current use of insulin  -     Hemoglobin A1C, POCT  -     tirzepatide 10 mg/0.5 mL PnIj; Inject 10 mg into the skin every 7 days.  Dispense: 4 pen ; Refill: 2  -     Microalbumin/creatinine urine ratio; Future  -     Comprehensive Metabolic Panel; Future; Expected date: 11/08/2023  -     Lipid Panel; Future; Expected date: 11/08/2023    Primary hypertension  -     Comprehensive Metabolic Panel; Future; Expected date: 11/08/2023    Hypothyroidism, unspecified type  -     T4, Free; Future; Expected date: 11/08/2023  -     TSH; Future; Expected date: 11/08/2023    Hyperlipidemia, unspecified hyperlipidemia type  Comments:  continue statin  labs today           No follow-ups on file.       -Patient instructed that our office calls back on all labs and imaging within 1 week of receiving the results. Patient instructed to reach out to our office if they have not heard from us so that we can request the results from the lab/imaging center           Pushpa Kinsey PA-C

## 2023-11-13 ENCOUNTER — TELEPHONE (OUTPATIENT)
Dept: FAMILY MEDICINE | Facility: CLINIC | Age: 43
End: 2023-11-13
Payer: MEDICARE

## 2023-11-13 VITALS — SYSTOLIC BLOOD PRESSURE: 133 MMHG | DIASTOLIC BLOOD PRESSURE: 78 MMHG

## 2023-11-14 PROCEDURE — G0179 MD RECERTIFICATION HHA PT: HCPCS | Mod: ,,, | Performed by: FAMILY MEDICINE

## 2023-12-09 DIAGNOSIS — E78.5 HYPERLIPIDEMIA, UNSPECIFIED HYPERLIPIDEMIA TYPE: ICD-10-CM

## 2023-12-11 RX ORDER — ATORVASTATIN CALCIUM 40 MG/1
TABLET, FILM COATED ORAL
Qty: 90 TABLET | Refills: 1 | Status: SHIPPED | OUTPATIENT
Start: 2023-12-11

## 2023-12-19 RX ORDER — NIFEDIPINE 60 MG/1
TABLET, EXTENDED RELEASE ORAL
Qty: 90 TABLET | Refills: 1 | Status: SHIPPED | OUTPATIENT
Start: 2023-12-19

## 2023-12-20 ENCOUNTER — EXTERNAL HOME HEALTH (OUTPATIENT)
Dept: HOME HEALTH SERVICES | Facility: HOSPITAL | Age: 43
End: 2023-12-20
Payer: MEDICARE

## 2024-01-03 ENCOUNTER — PATIENT MESSAGE (OUTPATIENT)
Dept: ADMINISTRATIVE | Facility: HOSPITAL | Age: 44
End: 2024-01-03
Payer: MEDICARE

## 2024-01-04 DIAGNOSIS — F32.A DEPRESSION, UNSPECIFIED DEPRESSION TYPE: ICD-10-CM

## 2024-01-04 RX ORDER — ESCITALOPRAM OXALATE 10 MG/1
TABLET ORAL
Qty: 90 TABLET | Refills: 1 | Status: SHIPPED | OUTPATIENT
Start: 2024-01-04

## 2024-01-09 ENCOUNTER — PATIENT MESSAGE (OUTPATIENT)
Dept: FAMILY MEDICINE | Facility: CLINIC | Age: 44
End: 2024-01-09
Payer: MEDICARE

## 2024-01-09 ENCOUNTER — PATIENT MESSAGE (OUTPATIENT)
Dept: ADMINISTRATIVE | Facility: HOSPITAL | Age: 44
End: 2024-01-09
Payer: MEDICARE

## 2024-01-09 NOTE — TELEPHONE ENCOUNTER
Patient is asking for lab work to be sent to [facility] she has appointment to morning

## 2024-01-10 ENCOUNTER — OFFICE VISIT (OUTPATIENT)
Dept: FAMILY MEDICINE | Facility: CLINIC | Age: 44
End: 2024-01-10
Payer: MEDICARE

## 2024-01-10 VITALS
HEART RATE: 107 BPM | SYSTOLIC BLOOD PRESSURE: 133 MMHG | BODY MASS INDEX: 44.76 KG/M2 | WEIGHT: 228 LBS | HEIGHT: 60 IN | OXYGEN SATURATION: 99 % | DIASTOLIC BLOOD PRESSURE: 84 MMHG

## 2024-01-10 DIAGNOSIS — R00.0 TACHYCARDIA: Primary | ICD-10-CM

## 2024-01-10 DIAGNOSIS — N28.9 RENAL INSUFFICIENCY: ICD-10-CM

## 2024-01-10 DIAGNOSIS — I10 PRIMARY HYPERTENSION: ICD-10-CM

## 2024-01-10 DIAGNOSIS — E03.9 HYPOTHYROIDISM, UNSPECIFIED TYPE: ICD-10-CM

## 2024-01-10 DIAGNOSIS — E11.69 TYPE 2 DIABETES MELLITUS WITH OTHER SPECIFIED COMPLICATION, WITHOUT LONG-TERM CURRENT USE OF INSULIN: ICD-10-CM

## 2024-01-10 DIAGNOSIS — G47.00 INSOMNIA, UNSPECIFIED TYPE: ICD-10-CM

## 2024-01-10 DIAGNOSIS — E78.5 HYPERLIPIDEMIA, UNSPECIFIED HYPERLIPIDEMIA TYPE: ICD-10-CM

## 2024-01-10 DIAGNOSIS — D64.9 ANEMIA, UNSPECIFIED TYPE: ICD-10-CM

## 2024-01-10 DIAGNOSIS — F41.1 GENERALIZED ANXIETY DISORDER: ICD-10-CM

## 2024-01-10 LAB
ALBUMIN/GLOBULIN RATIO: 0.9 (ref 1–2.7)
ALBUMIN: 3.9 (ref 3.5–5.2)
ALP SERPL-CCNC: 109 U/L (ref 35–105)
ALT SERPL W P-5'-P-CCNC: 13 U/L (ref 0–33)
ANION GAP SERPL CALC-SCNC: 11 MMOL/L (ref 8–17)
AST SERPL-CCNC: 18 U/L (ref 0–32)
BILIRUB SERPL-MCNC: 0.3 MG/DL (ref 0–1.2)
BUN SERPL-MCNC: 22.2 MG/DL (ref 6–20)
BUN/CREAT RATIO: 18.3 (ref 6–20)
CALCIUM SERPL-MCNC: 9.3 MG/DL (ref 8.6–10.2)
CHLORIDE SERPL-SCNC: 107 MMOL/L (ref 98–107)
CHOLEST SERPL-MSCNC: 264 MG/DL (ref 100–200)
CO2 SERPL-SCNC: 21 MMOL/L (ref 22–29)
CREAT SERPL-MCNC: 1.2 MG/DL (ref 0.5–0.9)
CREATININE RANDOM URINE: 230.1 MG/DL (ref 28–217)
GFR ESTIMATION: 57.39
GLOBULIN: 4.3 (ref 1.5–4.5)
GLUCOSE SERPL-MCNC: 107 MG/DL (ref 74–106)
HBA1C MFR BLD: 5.2 % (ref 4–6)
HDLC SERPL-MCNC: 90 MG/DL
LDL/HDL RATIO: 1.7 (ref 1–3)
LDLC SERPL CALC-MCNC: 151 MG/DL (ref 0–100)
MICROALB/CREAT RATIO: 1254.24 UG/MG (ref 0–30)
MICROALBUMIN QUANT: 288.6 (ref 0–2)
POTASSIUM SERPL-SCNC: 4.8 MMOL/L (ref 3.5–5.1)
PROT SNV-MCNC: 8.2 G/L (ref 6.4–8.3)
SODIUM BLD-SCNC: 139 MMOL/L (ref 136–145)
TRIGL SERPL-MCNC: 115 MG/DL (ref 0–150)

## 2024-01-10 PROCEDURE — 3008F BODY MASS INDEX DOCD: CPT | Mod: CPTII,S$GLB,, | Performed by: PHYSICIAN ASSISTANT

## 2024-01-10 PROCEDURE — 1159F MED LIST DOCD IN RCRD: CPT | Mod: CPTII,S$GLB,, | Performed by: PHYSICIAN ASSISTANT

## 2024-01-10 PROCEDURE — 99214 OFFICE O/P EST MOD 30 MIN: CPT | Mod: S$GLB,,, | Performed by: PHYSICIAN ASSISTANT

## 2024-01-10 PROCEDURE — 3079F DIAST BP 80-89 MM HG: CPT | Mod: CPTII,S$GLB,, | Performed by: PHYSICIAN ASSISTANT

## 2024-01-10 PROCEDURE — 3075F SYST BP GE 130 - 139MM HG: CPT | Mod: CPTII,S$GLB,, | Performed by: PHYSICIAN ASSISTANT

## 2024-01-10 RX ORDER — PROPRANOLOL HYDROCHLORIDE 60 MG/1
60 CAPSULE, EXTENDED RELEASE ORAL DAILY
Qty: 30 CAPSULE | Refills: 11 | Status: SHIPPED | OUTPATIENT
Start: 2024-01-10 | End: 2025-01-09

## 2024-01-10 RX ORDER — TRAZODONE HYDROCHLORIDE 50 MG/1
50 TABLET ORAL NIGHTLY
Qty: 30 TABLET | Refills: 1 | Status: SHIPPED | OUTPATIENT
Start: 2024-01-10 | End: 2024-02-05

## 2024-01-10 RX ORDER — CLINDAMYCIN HYDROCHLORIDE 300 MG/1
300 CAPSULE ORAL 4 TIMES DAILY
COMMUNITY

## 2024-01-10 NOTE — PROGRESS NOTES
Subjective:      Patient ID: Erica Law is a 43 y.o. female.    Chief Complaint: Medication Refill      HPI  Pt is here today for followup and new complaints-    New-  Insomnia -  Reports difficulty staying asleep at night    Tachycardia- took propranolol in the past but stopped on her own.  She did have a holter monitor order but did not complete this study due to illness.  Reordering today.  No CP no SOB.  Denies stimulants, excessive caffeine intake, energy drinks.      Chronic-   DM2- on mounjaro , denies s/s of highs or lows.  labs pending     HTN- well controlled on current     Hypothyroidism- on synthroid, due for labs     Renal insuff- following , labs today     HLD- on statin     Depression and anxiety- well controlled on lexapro     Wellness-  Labs pending   Mammo - done 2024, [facility]     Review of Systems   Constitutional:  Negative for activity change, appetite change, chills, diaphoresis, fatigue and unexpected weight change.   Eyes:  Negative for visual disturbance.   Respiratory:  Negative for cough, chest tightness, shortness of breath and wheezing.    Cardiovascular:  Negative for chest pain, palpitations and leg swelling.   Gastrointestinal:  Negative for abdominal pain, constipation, nausea and vomiting.   Neurological:  Negative for dizziness, vertigo, tremors, syncope, weakness, light-headedness, numbness and headaches.   Psychiatric/Behavioral:  Positive for sleep disturbance. Negative for agitation, behavioral problems, confusion, decreased concentration, dysphoric mood, hallucinations, self-injury and suicidal ideas. The patient is not nervous/anxious and is not hyperactive.        Medication List with Changes/Refills   New Medications    PROPRANOLOL (INDERAL LA) 60 MG 24 HR CAPSULE    Take 1 capsule (60 mg total) by mouth once daily.    TRAZODONE (DESYREL) 50 MG TABLET    Take 1 tablet (50 mg total) by mouth every evening.   Current Medications    ATORVASTATIN (LIPITOR) 40 MG TABLET     TAKE 1 TABLET BY MOUTH EVERY DAY    CLINDAMYCIN (CLEOCIN) 300 MG CAPSULE    Take 300 mg by mouth 4 (four) times daily.    ESCITALOPRAM OXALATE (LEXAPRO) 10 MG TABLET    TAKE 1 TABLET BY MOUTH EVERY DAY    GABAPENTIN (NEURONTIN) 300 MG CAPSULE    TAKE 1 CAPSULE BY MOUTH THREE TIMES A DAY    HYDROCODONE-ACETAMINOPHEN (NORCO) 5-325 MG PER TABLET    Take 1 tablet by mouth every 4 to 6 hours as needed.    IRON 100 PLUS TAB    TAKE 1 TABLET BY MOUTH TWICE A DAY    LEVOTHYROXINE (SYNTHROID) 50 MCG TABLET    TAKE 1 TABLET BY MOUTH EVERY DAY BEFORE BREAKFAST    NIFEDIPINE (ADALAT CC) 60 MG TBSR    TAKE 1 TABLET BY MOUTH EVERY DAY    TIRZEPATIDE 10 MG/0.5 ML PNIJ    Inject 10 mg into the skin every 7 days.   Discontinued Medications    FLUCONAZOLE (DIFLUCAN) 150 MG TAB    TAKE 1 TABLET BY MOUTH ONCE A WEEK FOR 21 DAYS        Objective:     Vitals:    01/10/24 1512   BP: 133/84   Pulse: 107   SpO2: 99%   Weight: 103.4 kg (228 lb)   Height: 5' (1.524 m)        Physical Exam  Constitutional:       Appearance: Normal appearance. She is obese.   HENT:      Head: Normocephalic and atraumatic.      Nose: Nose normal.   Eyes:      Conjunctiva/sclera: Conjunctivae normal.      Comments: Eyes tracking normal on exam    Cardiovascular:      Rate and Rhythm: Regular rhythm. Tachycardia present.      Pulses: Normal pulses.      Heart sounds: Normal heart sounds. No murmur heard.     No friction rub. No gallop.   Pulmonary:      Effort: Pulmonary effort is normal. No respiratory distress.      Breath sounds: Normal breath sounds. No stridor. No wheezing, rhonchi or rales.   Musculoskeletal:      Right lower leg: No edema.      Left lower leg: No edema.      Comments: Moves all extremities well and with good control  Normal gait observed      Skin:     General: Skin is warm and dry.      Capillary Refill: Capillary refill takes less than 2 seconds.   Neurological:      Mental Status: She is alert and oriented to person, place, and time.    Psychiatric:         Mood and Affect: Mood normal.         Behavior: Behavior normal.         Thought Content: Thought content normal.         Judgment: Judgment normal.            Assessment & Plan:     Tachycardia  Comments:  Follow-up with me after Holter monitor  Orders:  -     propranoloL (INDERAL LA) 60 MG 24 hr capsule; Take 1 capsule (60 mg total) by mouth once daily.  Dispense: 30 capsule; Refill: 11  -     Holter monitor - 48 hour; Future    Insomnia, unspecified type  Comments:  Follow-up with me in 2-3 weeks to discuss Holter monitor and insomnia med  Orders:  -     traZODone (DESYREL) 50 MG tablet; Take 1 tablet (50 mg total) by mouth every evening.  Dispense: 30 tablet; Refill: 1    Generalized anxiety disorder  Comments:  Well controlled on current    Hyperlipidemia, unspecified hyperlipidemia type  Comments:  Labs pending    Primary hypertension  Comments:  Well controlled on current    Renal insufficiency  Comments:  Labs pending    Anemia, unspecified type  Comments:  Labs pending    Type 2 diabetes mellitus with other specified complication, without long-term current use of insulin  Comments:  Follow-up labs pending.  Continue current med management    Hypothyroidism, unspecified type  Comments:  Continue Synthroid, labs pending       2-3 wks followup       -Patient instructed that our office calls back on all labs and imaging within 1 week of receiving the results. Patient instructed to reach out to our office if they have not heard from us so that we can request the results from the lab/imaging center           Pushpa Kinsey PA-C

## 2024-01-11 DIAGNOSIS — E03.9 HYPOTHYROIDISM, UNSPECIFIED TYPE: ICD-10-CM

## 2024-01-11 RX ORDER — LEVOTHYROXINE SODIUM 50 UG/1
TABLET ORAL
Qty: 30 TABLET | Refills: 2 | Status: SHIPPED | OUTPATIENT
Start: 2024-01-11

## 2024-01-12 ENCOUNTER — PATIENT OUTREACH (OUTPATIENT)
Dept: ADMINISTRATIVE | Facility: HOSPITAL | Age: 44
End: 2024-01-12
Payer: MEDICARE

## 2024-01-12 ENCOUNTER — PATIENT MESSAGE (OUTPATIENT)
Dept: FAMILY MEDICINE | Facility: CLINIC | Age: 44
End: 2024-01-12
Payer: MEDICARE

## 2024-01-12 NOTE — PROGRESS NOTES
Replying to Campaign Questionnaire for Overdue HM:  Mamma and DM Labs    Patient instructed to call central scheduling to schedule mammo. Order already placed by PCP. Patient will have labs ordered at upcoming PCP appt in 02/2024

## 2024-01-13 PROCEDURE — G0179 MD RECERTIFICATION HHA PT: HCPCS | Mod: ,,, | Performed by: FAMILY MEDICINE

## 2024-01-20 ENCOUNTER — PATIENT MESSAGE (OUTPATIENT)
Dept: FAMILY MEDICINE | Facility: CLINIC | Age: 44
End: 2024-01-20
Payer: MEDICARE

## 2024-01-26 ENCOUNTER — TELEPHONE (OUTPATIENT)
Dept: HEMATOLOGY/ONCOLOGY | Facility: CLINIC | Age: 44
End: 2024-01-26
Payer: MEDICARE

## 2024-01-26 ENCOUNTER — PATIENT MESSAGE (OUTPATIENT)
Dept: HEMATOLOGY/ONCOLOGY | Facility: CLINIC | Age: 44
End: 2024-01-26
Payer: MEDICARE

## 2024-01-26 NOTE — TELEPHONE ENCOUNTER
Patient called to have labs sent to [facility] for visit 1/29/2024. Labs faxed to [facility]. 1/26/2024 CM

## 2024-02-01 ENCOUNTER — PATIENT MESSAGE (OUTPATIENT)
Dept: FAMILY MEDICINE | Facility: CLINIC | Age: 44
End: 2024-02-01
Payer: MEDICARE

## 2024-02-01 DIAGNOSIS — G47.00 INSOMNIA, UNSPECIFIED TYPE: Primary | ICD-10-CM

## 2024-02-01 DIAGNOSIS — E11.69 TYPE 2 DIABETES MELLITUS WITH OTHER SPECIFIED COMPLICATION, WITHOUT LONG-TERM CURRENT USE OF INSULIN: ICD-10-CM

## 2024-02-01 DIAGNOSIS — B37.31 CANDIDA VAGINITIS: ICD-10-CM

## 2024-02-01 RX ORDER — FLUCONAZOLE 150 MG/1
TABLET ORAL
Qty: 3 TABLET | Refills: 0 | Status: SHIPPED | OUTPATIENT
Start: 2024-02-01 | End: 2024-05-23

## 2024-02-01 RX ORDER — TIRZEPATIDE 10 MG/.5ML
10 INJECTION, SOLUTION SUBCUTANEOUS
Qty: 4 PEN | Refills: 5 | Status: SHIPPED | OUTPATIENT
Start: 2024-02-01 | End: 2024-05-23

## 2024-02-01 RX ORDER — GABAPENTIN 300 MG/1
CAPSULE ORAL
Qty: 90 CAPSULE | Refills: 3 | Status: SHIPPED | OUTPATIENT
Start: 2024-02-01

## 2024-02-01 NOTE — TELEPHONE ENCOUNTER
Has been Noncompliant with follow-up and home fingersticks  With hyperglycemia and vascular complications.   A1c 9.5  Continue ssi  Added back small dose of lantus.      Please see the attached refill request.

## 2024-02-02 ENCOUNTER — EXTERNAL HOME HEALTH (OUTPATIENT)
Dept: HOME HEALTH SERVICES | Facility: HOSPITAL | Age: 44
End: 2024-02-02
Payer: MEDICARE

## 2024-02-05 RX ORDER — TRAZODONE HYDROCHLORIDE 100 MG/1
100 TABLET ORAL NIGHTLY
Qty: 30 TABLET | Refills: 5 | Status: SHIPPED | OUTPATIENT
Start: 2024-02-05 | End: 2024-06-04

## 2024-02-14 ENCOUNTER — OFFICE VISIT (OUTPATIENT)
Dept: OBSTETRICS AND GYNECOLOGY | Facility: CLINIC | Age: 44
End: 2024-02-14
Payer: MEDICARE

## 2024-02-14 VITALS
SYSTOLIC BLOOD PRESSURE: 134 MMHG | DIASTOLIC BLOOD PRESSURE: 87 MMHG | HEART RATE: 110 BPM | BODY MASS INDEX: 46.23 KG/M2 | HEIGHT: 60 IN | WEIGHT: 235.5 LBS

## 2024-02-14 DIAGNOSIS — Z12.4 SCREENING FOR MALIGNANT NEOPLASM OF THE CERVIX: ICD-10-CM

## 2024-02-14 DIAGNOSIS — Z01.419 WELL WOMAN EXAM WITH ROUTINE GYNECOLOGICAL EXAM: Primary | ICD-10-CM

## 2024-02-14 PROCEDURE — 3075F SYST BP GE 130 - 139MM HG: CPT | Mod: CPTII,S$GLB,, | Performed by: NURSE PRACTITIONER

## 2024-02-14 PROCEDURE — G0101 CA SCREEN;PELVIC/BREAST EXAM: HCPCS | Mod: S$GLB,,, | Performed by: NURSE PRACTITIONER

## 2024-02-14 PROCEDURE — 3079F DIAST BP 80-89 MM HG: CPT | Mod: CPTII,S$GLB,, | Performed by: NURSE PRACTITIONER

## 2024-02-14 PROCEDURE — 3008F BODY MASS INDEX DOCD: CPT | Mod: CPTII,S$GLB,, | Performed by: NURSE PRACTITIONER

## 2024-02-14 PROCEDURE — 1159F MED LIST DOCD IN RCRD: CPT | Mod: CPTII,S$GLB,, | Performed by: NURSE PRACTITIONER

## 2024-02-14 NOTE — PROGRESS NOTES
Subjective:       Patient ID: Erica Law is a 43 y.o. female.    Chief Complaint:  Well Woman      History of Present Illness   Presents for annual gyn exam. History and past labs reviewed with patient.    Denies any Complaints   Not using contraception  Not sexually active     OB History          2    Para   1    Term   1            AB   1    Living   1         SAB   1    IAB        Ectopic        Multiple        Live Births   1                  Review of Systems  Review of Systems   Constitutional:  Negative for chills and fever.   Respiratory:  Negative for shortness of breath.    Cardiovascular:  Negative for chest pain.   Gastrointestinal:  Negative for abdominal pain, blood in stool, constipation, diarrhea, nausea, vomiting and reflux.   Genitourinary:  Negative for dysmenorrhea, dyspareunia, dysuria, hematuria, hot flashes, menorrhagia, menstrual problem, pelvic pain, vaginal bleeding, vaginal discharge, postcoital bleeding and vaginal dryness.   Musculoskeletal:  Negative for arthralgias and joint swelling.   Integumentary:  Negative for rash, hair changes, breast mass, nipple discharge and breast skin changes.   Psychiatric/Behavioral:  Negative for depression. The patient is not nervous/anxious.    Breast: Negative for asymmetry, lump, mass, nipple discharge and skin changes          Objective:     Vitals:    24 1447   BP: 134/87   Pulse: 110   Weight: 106.8 kg (235 lb 8 oz)   Height: 5' (1.524 m)        Physical Exam:   Constitutional: She is oriented to person, place, and time. She appears well-developed and well-nourished.    HENT:   Head: Normocephalic and atraumatic.    Eyes: Pupils are equal, round, and reactive to light. Conjunctivae are normal.    Neck: No thyromegaly present.    Cardiovascular:  Normal rate, regular rhythm and normal heart sounds.             Pulmonary/Chest: Effort normal and breath sounds normal.        Abdominal: Soft.     Genitourinary:    Inguinal  canal, vagina, uterus, right adnexa, left adnexa and rectum normal.      Pelvic exam was performed with patient supine.   The external female genitalia was abnormal.   Cervix is normal. No vaginal discharge in the vagina.    pap smear completedUterus consistancy normal and Uerus contour normal  Uterus is not tender.    Genitourinary Comments: Scarring from HS and surgical repair              Musculoskeletal: Normal range of motion and moves all extremeties.       Neurological: She is alert and oriented to person, place, and time. She has normal reflexes.    Skin: Skin is warm and dry.    Psychiatric: She has a normal mood and affect. Her behavior is normal. Judgment and thought content normal.       breast exam wnl- no nipple dc, skin changes, masses or lymph nodes palpated bilaterally    Assessment:     1. Well woman exam with routine gynecological exam    2. Screening for malignant neoplasm of the cervix              Plan:       Well woman exam with routine gynecological exam    Screening for malignant neoplasm of the cervix  -     Liquid-based pap smear, screening       Declines ST testing  Dr Silver managing mmg  Risk assessment for inherited gyn cancer done   Healthy diet, lifestyle, and exercise   OTC Women's multi vitamin  Patient was counseled today on A.C.S. Pap guidelines and recommendations for yearly pelvic exams, mammograms and monthly self breast exams; to see her PCP for other health maintenance.       Follow up in about 1 year (around 2/14/2025).

## 2024-02-15 ENCOUNTER — PATIENT MESSAGE (OUTPATIENT)
Dept: FAMILY MEDICINE | Facility: CLINIC | Age: 44
End: 2024-02-15
Payer: MEDICARE

## 2024-02-15 ENCOUNTER — PATIENT MESSAGE (OUTPATIENT)
Dept: OBSTETRICS AND GYNECOLOGY | Facility: CLINIC | Age: 44
End: 2024-02-15
Payer: MEDICARE

## 2024-02-15 ENCOUNTER — TELEPHONE (OUTPATIENT)
Dept: PRIMARY CARE CLINIC | Facility: CLINIC | Age: 44
End: 2024-02-15
Payer: MEDICARE

## 2024-02-19 ENCOUNTER — PATIENT MESSAGE (OUTPATIENT)
Dept: OBSTETRICS AND GYNECOLOGY | Facility: CLINIC | Age: 44
End: 2024-02-19
Payer: MEDICARE

## 2024-02-20 LAB — Lab: NORMAL

## 2024-03-08 DIAGNOSIS — G47.00 INSOMNIA, UNSPECIFIED TYPE: ICD-10-CM

## 2024-03-11 RX ORDER — TRAZODONE HYDROCHLORIDE 50 MG/1
50 TABLET ORAL NIGHTLY
Qty: 90 TABLET | Refills: 1 | Status: SHIPPED | OUTPATIENT
Start: 2024-03-11 | End: 2024-06-04

## 2024-03-13 PROCEDURE — G0179 MD RECERTIFICATION HHA PT: HCPCS | Mod: ,,, | Performed by: FAMILY MEDICINE

## 2024-04-03 ENCOUNTER — PATIENT MESSAGE (OUTPATIENT)
Dept: ADMINISTRATIVE | Facility: HOSPITAL | Age: 44
End: 2024-04-03
Payer: MEDICARE

## 2024-04-08 DIAGNOSIS — E03.9 HYPOTHYROIDISM, UNSPECIFIED TYPE: ICD-10-CM

## 2024-04-08 RX ORDER — LEVOTHYROXINE SODIUM 50 UG/1
TABLET ORAL
Qty: 30 TABLET | Refills: 2 | Status: SHIPPED | OUTPATIENT
Start: 2024-04-08

## 2024-04-12 ENCOUNTER — EXTERNAL HOME HEALTH (OUTPATIENT)
Dept: HOME HEALTH SERVICES | Facility: HOSPITAL | Age: 44
End: 2024-04-12
Payer: MEDICARE

## 2024-05-07 RX ORDER — IRON,CARB/VIT C/VIT B12/FOLIC 100-250-1
TABLET ORAL
Qty: 60 TABLET | Refills: 8 | Status: SHIPPED | OUTPATIENT
Start: 2024-05-07

## 2024-05-23 DIAGNOSIS — E78.5 HYPERLIPIDEMIA, UNSPECIFIED HYPERLIPIDEMIA TYPE: ICD-10-CM

## 2024-05-23 DIAGNOSIS — E11.69 TYPE 2 DIABETES MELLITUS WITH OTHER SPECIFIED COMPLICATION, WITHOUT LONG-TERM CURRENT USE OF INSULIN: ICD-10-CM

## 2024-05-23 DIAGNOSIS — F32.A DEPRESSION, UNSPECIFIED DEPRESSION TYPE: ICD-10-CM

## 2024-05-23 DIAGNOSIS — B37.31 CANDIDA VAGINITIS: ICD-10-CM

## 2024-05-23 RX ORDER — TIRZEPATIDE 10 MG/.5ML
10 INJECTION, SOLUTION SUBCUTANEOUS
Qty: 2 ML | Refills: 5 | Status: SHIPPED | OUTPATIENT
Start: 2024-05-23 | End: 2024-06-11

## 2024-05-23 RX ORDER — ESCITALOPRAM OXALATE 10 MG/1
TABLET ORAL
Qty: 90 TABLET | Refills: 1 | Status: SHIPPED | OUTPATIENT
Start: 2024-05-23 | End: 2024-06-04

## 2024-05-23 RX ORDER — ATORVASTATIN CALCIUM 40 MG/1
TABLET, FILM COATED ORAL
Qty: 90 TABLET | Refills: 1 | Status: SHIPPED | OUTPATIENT
Start: 2024-05-23

## 2024-05-23 RX ORDER — FLUCONAZOLE 150 MG/1
TABLET ORAL
Qty: 3 TABLET | Refills: 0 | Status: SHIPPED | OUTPATIENT
Start: 2024-05-23 | End: 2024-06-04

## 2024-05-23 RX ORDER — NIFEDIPINE 60 MG/1
TABLET, EXTENDED RELEASE ORAL
Qty: 90 TABLET | Refills: 1 | Status: SHIPPED | OUTPATIENT
Start: 2024-05-23 | End: 2024-06-04

## 2024-05-31 DIAGNOSIS — E11.69 TYPE 2 DIABETES MELLITUS WITH OTHER SPECIFIED COMPLICATION, WITHOUT LONG-TERM CURRENT USE OF INSULIN: Primary | ICD-10-CM

## 2024-06-04 ENCOUNTER — OFFICE VISIT (OUTPATIENT)
Dept: PRIMARY CARE CLINIC | Facility: CLINIC | Age: 44
End: 2024-06-04
Payer: MEDICARE

## 2024-06-04 ENCOUNTER — PATIENT MESSAGE (OUTPATIENT)
Dept: PRIMARY CARE CLINIC | Facility: CLINIC | Age: 44
End: 2024-06-04

## 2024-06-04 VITALS
HEART RATE: 106 BPM | HEIGHT: 60 IN | BODY MASS INDEX: 49.48 KG/M2 | WEIGHT: 252 LBS | RESPIRATION RATE: 16 BRPM | DIASTOLIC BLOOD PRESSURE: 98 MMHG | OXYGEN SATURATION: 99 % | SYSTOLIC BLOOD PRESSURE: 144 MMHG

## 2024-06-04 DIAGNOSIS — R76.8 POSITIVE ANA (ANTINUCLEAR ANTIBODY): ICD-10-CM

## 2024-06-04 DIAGNOSIS — E86.0 DEHYDRATION: ICD-10-CM

## 2024-06-04 DIAGNOSIS — I10 PRIMARY HYPERTENSION: ICD-10-CM

## 2024-06-04 DIAGNOSIS — E78.5 HYPERLIPIDEMIA, UNSPECIFIED HYPERLIPIDEMIA TYPE: ICD-10-CM

## 2024-06-04 DIAGNOSIS — F41.1 GENERALIZED ANXIETY DISORDER: ICD-10-CM

## 2024-06-04 DIAGNOSIS — E66.9 OBESITY, UNSPECIFIED CLASSIFICATION, UNSPECIFIED OBESITY TYPE, UNSPECIFIED WHETHER SERIOUS COMORBIDITY PRESENT: ICD-10-CM

## 2024-06-04 DIAGNOSIS — E11.69 TYPE 2 DIABETES MELLITUS WITH OTHER SPECIFIED COMPLICATION, WITHOUT LONG-TERM CURRENT USE OF INSULIN: ICD-10-CM

## 2024-06-04 DIAGNOSIS — E03.9 HYPOTHYROIDISM, UNSPECIFIED TYPE: ICD-10-CM

## 2024-06-04 DIAGNOSIS — N28.9 RENAL INSUFFICIENCY: ICD-10-CM

## 2024-06-04 DIAGNOSIS — G47.00 INSOMNIA, UNSPECIFIED TYPE: Primary | ICD-10-CM

## 2024-06-04 PROCEDURE — 3008F BODY MASS INDEX DOCD: CPT | Mod: CPTII,S$GLB,, | Performed by: PHYSICIAN ASSISTANT

## 2024-06-04 PROCEDURE — 3077F SYST BP >= 140 MM HG: CPT | Mod: CPTII,S$GLB,, | Performed by: PHYSICIAN ASSISTANT

## 2024-06-04 PROCEDURE — 3080F DIAST BP >= 90 MM HG: CPT | Mod: CPTII,S$GLB,, | Performed by: PHYSICIAN ASSISTANT

## 2024-06-04 PROCEDURE — 99215 OFFICE O/P EST HI 40 MIN: CPT | Mod: S$GLB,,, | Performed by: PHYSICIAN ASSISTANT

## 2024-06-04 RX ORDER — ARIPIPRAZOLE 10 MG/1
10 TABLET ORAL NIGHTLY
COMMUNITY
Start: 2024-05-23

## 2024-06-04 RX ORDER — CEPHALEXIN 500 MG/1
CAPSULE ORAL
COMMUNITY
Start: 2024-06-03 | End: 2024-06-04

## 2024-06-04 RX ORDER — TRAZODONE HYDROCHLORIDE 150 MG/1
150 TABLET ORAL NIGHTLY
Qty: 30 TABLET | Refills: 2 | Status: SHIPPED | OUTPATIENT
Start: 2024-06-04 | End: 2024-06-11 | Stop reason: SDUPTHER

## 2024-06-04 RX ORDER — AMITRIPTYLINE HYDROCHLORIDE 25 MG/1
25 TABLET, FILM COATED ORAL NIGHTLY
COMMUNITY
Start: 2024-05-23

## 2024-06-04 RX ORDER — NIFEDIPINE 90 MG/1
90 TABLET, EXTENDED RELEASE ORAL DAILY
Qty: 30 TABLET | Refills: 1 | Status: SHIPPED | OUTPATIENT
Start: 2024-06-04 | End: 2024-06-11 | Stop reason: SDUPTHER

## 2024-06-04 RX ORDER — TOPIRAMATE 25 MG/1
TABLET ORAL
COMMUNITY
Start: 2024-05-23

## 2024-06-04 RX ORDER — MELOXICAM 15 MG/1
15 TABLET ORAL
COMMUNITY
Start: 2024-05-23

## 2024-06-04 NOTE — PROGRESS NOTES
Subjective:      Patient ID: Erica Law is a 44 y.o. female.    Chief Complaint: Annual Exam      HPI   Patient is here today for hospital follow-up and new complaints.      Hospital follow-up patient was recently admitted for inpatient psychiatric services in [place].  Patient reports that she was admitted for 11 days and discharged on May 23, 2024.  She was admitted during a manic episode and also for drug detox for opioid addiction   Discharged with a diagnosis of bipolar syndrome.    She has follow-up scheduled with narcotics anonymous and med management with [facility].  Patient is unsure of her new med list, but I did ask her to bring it to the next visit    Insomnia- not sleeping on 100mg trazodone .  Patient reports she is getting approximately 4 hours of sleep per night.    HTN- not controlled   On nifedipine 60 mg daily.     Also of note, patient reports last week she went to [facility] for bilateral lower extremity edema.  Patient had labs and was discharged same day with diagnosis of UTI.  Patient reports her leg swelling has resolved since this episode.    Chronic-   DM2- on mounjaro , denies s/s of highs or lows.    Due for lab .  Patient reports she has had difficulty maintaining her weight     Hypothyroidism- on synthroid, due for labs      Renal insuff-  has follow-up scheduled with Nephrology     HLD- on statin      Review of Systems   Constitutional:  Positive for unexpected weight change. Negative for activity change, appetite change, chills, diaphoresis and fatigue.   Eyes:  Negative for visual disturbance.   Respiratory:  Negative for cough, chest tightness, shortness of breath and wheezing.    Cardiovascular:  Negative for chest pain, palpitations and leg swelling.   Gastrointestinal:  Negative for abdominal pain, constipation, nausea and vomiting.   Neurological:  Negative for dizziness, vertigo, tremors, syncope, weakness, light-headedness, numbness and  headaches.   Psychiatric/Behavioral:  Positive for agitation and sleep disturbance. Negative for behavioral problems, confusion, decreased concentration, dysphoric mood, hallucinations, self-injury and suicidal ideas. The patient is not nervous/anxious and is not hyperactive.        Medication List with Changes/Refills   New Medications    NIFEDIPINE (PROCARDIA-XL) 90 MG (OSM) 24 HR TABLET    Take 1 tablet (90 mg total) by mouth once daily.    TRAZODONE (DESYREL) 150 MG TABLET    Take 1 tablet (150 mg total) by mouth every evening.   Current Medications    AMITRIPTYLINE (ELAVIL) 25 MG TABLET    Take 25 mg by mouth every evening.    ARIPIPRAZOLE (ABILIFY) 10 MG TAB    Take 10 mg by mouth every evening.    ATORVASTATIN (LIPITOR) 40 MG TABLET    TAKE 1 TABLET BY MOUTH EVERY DAY    GABAPENTIN (NEURONTIN) 300 MG CAPSULE    TAKE 1 CAPSULE BY MOUTH THREE TIMES A DAY    IRON 100 PLUS TAB    TAKE 1 TABLET BY MOUTH TWICE A DAY    LEVOTHYROXINE (SYNTHROID) 50 MCG TABLET    TAKE 1 TABLET BY MOUTH EVERY DAY BEFORE BREAKFAST    MELOXICAM (MOBIC) 15 MG TABLET    Take 15 mg by mouth.    TIRZEPATIDE (MOUNJARO) 10 MG/0.5 ML PNIJ    INJECT 10 MG INTO THE SKIN EVERY 7 DAYS    TOPIRAMATE (TOPAMAX) 25 MG TABLET    TAKE 1 TABLET BY MOUTH EVERY MORNING & 3 TABLETS BY MOUTH AT BEDTIME   Discontinued Medications    CEPHALEXIN (KEFLEX) 500 MG CAPSULE        CLINDAMYCIN (CLEOCIN) 300 MG CAPSULE    Take 300 mg by mouth 4 (four) times daily.    ESCITALOPRAM OXALATE (LEXAPRO) 10 MG TABLET    TAKE 1 TABLET BY MOUTH EVERY DAY    FLUCONAZOLE (DIFLUCAN) 150 MG TAB    TAKE 1 TABLET BY MOUTH ONCE A WEEK FOR 3 WEEKS    HYDROCODONE-ACETAMINOPHEN (NORCO) 5-325 MG PER TABLET    Take 1 tablet by mouth every 4 to 6 hours as needed.    NIFEDIPINE (ADALAT CC) 60 MG TBSR    TAKE 1 TABLET BY MOUTH EVERY DAY    PROPRANOLOL (INDERAL LA) 60 MG 24 HR CAPSULE    Take 1 capsule (60 mg total) by mouth once daily.    TRAZODONE (DESYREL) 100 MG TABLET    Take 1 tablet  (100 mg total) by mouth every evening.    TRAZODONE (DESYREL) 50 MG TABLET    TAKE 1 TABLET BY MOUTH EVERY EVENING.        Objective:     Vitals:    06/04/24 1410   BP: (!) 144/98   Pulse: 106   Resp: 16   SpO2: 99%   Weight: 114.3 kg (252 lb)   Height: 5' (1.524 m)        Physical Exam  Constitutional:       Appearance: Normal appearance. She is obese.   HENT:      Head: Normocephalic and atraumatic.      Nose: Nose normal.   Eyes:      Conjunctiva/sclera: Conjunctivae normal.      Comments: Eyes tracking normal on exam    Cardiovascular:      Rate and Rhythm: Normal rate and regular rhythm.      Pulses: Normal pulses.      Heart sounds: Normal heart sounds. No murmur heard.     No friction rub. No gallop.   Pulmonary:      Effort: Pulmonary effort is normal. No respiratory distress.      Breath sounds: Normal breath sounds. No stridor. No wheezing, rhonchi or rales.   Musculoskeletal:      Right lower leg: No edema.      Left lower leg: No edema.      Comments: Moves all extremities well and with good control  Normal gait observed      Skin:     General: Skin is warm and dry.      Capillary Refill: Capillary refill takes less than 2 seconds.   Neurological:      Mental Status: She is alert and oriented to person, place, and time.   Psychiatric:         Mood and Affect: Mood normal.         Behavior: Behavior normal.         Thought Content: Thought content normal.         Judgment: Judgment normal.            Assessment & Plan:     Insomnia, unspecified type  -     traZODone (DESYREL) 150 MG tablet; Take 1 tablet (150 mg total) by mouth every evening.  Dispense: 30 tablet; Refill: 2  -     CBC Auto Differential; Future; Expected date: 06/04/2024  -     Comprehensive Metabolic Panel; Future; Expected date: 06/04/2024  -     Hemoglobin A1C; Future; Expected date: 06/04/2024  -     T4, Free; Future; Expected date: 06/04/2024  -     TSH; Future; Expected date: 06/04/2024  -     Urinalysis, Reflex to Urine Culture  Urine, Clean Catch; Future  -     Iron, TIBC and Ferritin Panel; Future; Expected date: 06/05/2024  -     NIFEdipine (PROCARDIA-XL) 90 MG (OSM) 24 hr tablet; Take 1 tablet (90 mg total) by mouth once daily.  Dispense: 30 tablet; Refill: 1    Hyperlipidemia, unspecified hyperlipidemia type  -     traZODone (DESYREL) 150 MG tablet; Take 1 tablet (150 mg total) by mouth every evening.  Dispense: 30 tablet; Refill: 2  -     CBC Auto Differential; Future; Expected date: 06/04/2024  -     Comprehensive Metabolic Panel; Future; Expected date: 06/04/2024  -     Hemoglobin A1C; Future; Expected date: 06/04/2024  -     T4, Free; Future; Expected date: 06/04/2024  -     TSH; Future; Expected date: 06/04/2024  -     Urinalysis, Reflex to Urine Culture Urine, Clean Catch; Future  -     Iron, TIBC and Ferritin Panel; Future; Expected date: 06/05/2024  -     NIFEdipine (PROCARDIA-XL) 90 MG (OSM) 24 hr tablet; Take 1 tablet (90 mg total) by mouth once daily.  Dispense: 30 tablet; Refill: 1    Generalized anxiety disorder  -     traZODone (DESYREL) 150 MG tablet; Take 1 tablet (150 mg total) by mouth every evening.  Dispense: 30 tablet; Refill: 2  -     CBC Auto Differential; Future; Expected date: 06/04/2024  -     Comprehensive Metabolic Panel; Future; Expected date: 06/04/2024  -     Hemoglobin A1C; Future; Expected date: 06/04/2024  -     T4, Free; Future; Expected date: 06/04/2024  -     TSH; Future; Expected date: 06/04/2024  -     Urinalysis, Reflex to Urine Culture Urine, Clean Catch; Future  -     Iron, TIBC and Ferritin Panel; Future; Expected date: 06/05/2024  -     NIFEdipine (PROCARDIA-XL) 90 MG (OSM) 24 hr tablet; Take 1 tablet (90 mg total) by mouth once daily.  Dispense: 30 tablet; Refill: 1    Positive SHELLY (antinuclear antibody)  -     traZODone (DESYREL) 150 MG tablet; Take 1 tablet (150 mg total) by mouth every evening.  Dispense: 30 tablet; Refill: 2  -     CBC Auto Differential; Future; Expected date:  06/04/2024  -     Comprehensive Metabolic Panel; Future; Expected date: 06/04/2024  -     Hemoglobin A1C; Future; Expected date: 06/04/2024  -     T4, Free; Future; Expected date: 06/04/2024  -     TSH; Future; Expected date: 06/04/2024  -     Urinalysis, Reflex to Urine Culture Urine, Clean Catch; Future  -     Iron, TIBC and Ferritin Panel; Future; Expected date: 06/05/2024  -     NIFEdipine (PROCARDIA-XL) 90 MG (OSM) 24 hr tablet; Take 1 tablet (90 mg total) by mouth once daily.  Dispense: 30 tablet; Refill: 1    Renal insufficiency  -     traZODone (DESYREL) 150 MG tablet; Take 1 tablet (150 mg total) by mouth every evening.  Dispense: 30 tablet; Refill: 2  -     CBC Auto Differential; Future; Expected date: 06/04/2024  -     Comprehensive Metabolic Panel; Future; Expected date: 06/04/2024  -     Hemoglobin A1C; Future; Expected date: 06/04/2024  -     T4, Free; Future; Expected date: 06/04/2024  -     TSH; Future; Expected date: 06/04/2024  -     Urinalysis, Reflex to Urine Culture Urine, Clean Catch; Future  -     Iron, TIBC and Ferritin Panel; Future; Expected date: 06/05/2024  -     NIFEdipine (PROCARDIA-XL) 90 MG (OSM) 24 hr tablet; Take 1 tablet (90 mg total) by mouth once daily.  Dispense: 30 tablet; Refill: 1    Dehydration  -     traZODone (DESYREL) 150 MG tablet; Take 1 tablet (150 mg total) by mouth every evening.  Dispense: 30 tablet; Refill: 2  -     CBC Auto Differential; Future; Expected date: 06/04/2024  -     Comprehensive Metabolic Panel; Future; Expected date: 06/04/2024  -     Hemoglobin A1C; Future; Expected date: 06/04/2024  -     T4, Free; Future; Expected date: 06/04/2024  -     TSH; Future; Expected date: 06/04/2024  -     Urinalysis, Reflex to Urine Culture Urine, Clean Catch; Future  -     Iron, TIBC and Ferritin Panel; Future; Expected date: 06/05/2024  -     NIFEdipine (PROCARDIA-XL) 90 MG (OSM) 24 hr tablet; Take 1 tablet (90 mg total) by mouth once daily.  Dispense: 30 tablet;  Refill: 1    Primary hypertension  -     traZODone (DESYREL) 150 MG tablet; Take 1 tablet (150 mg total) by mouth every evening.  Dispense: 30 tablet; Refill: 2  -     CBC Auto Differential; Future; Expected date: 06/04/2024  -     Comprehensive Metabolic Panel; Future; Expected date: 06/04/2024  -     Hemoglobin A1C; Future; Expected date: 06/04/2024  -     T4, Free; Future; Expected date: 06/04/2024  -     TSH; Future; Expected date: 06/04/2024  -     Urinalysis, Reflex to Urine Culture Urine, Clean Catch; Future  -     Iron, TIBC and Ferritin Panel; Future; Expected date: 06/05/2024  -     NIFEdipine (PROCARDIA-XL) 90 MG (OSM) 24 hr tablet; Take 1 tablet (90 mg total) by mouth once daily.  Dispense: 30 tablet; Refill: 1    Hypothyroidism, unspecified type  -     traZODone (DESYREL) 150 MG tablet; Take 1 tablet (150 mg total) by mouth every evening.  Dispense: 30 tablet; Refill: 2  -     CBC Auto Differential; Future; Expected date: 06/04/2024  -     Comprehensive Metabolic Panel; Future; Expected date: 06/04/2024  -     Hemoglobin A1C; Future; Expected date: 06/04/2024  -     T4, Free; Future; Expected date: 06/04/2024  -     TSH; Future; Expected date: 06/04/2024  -     Urinalysis, Reflex to Urine Culture Urine, Clean Catch; Future  -     Iron, TIBC and Ferritin Panel; Future; Expected date: 06/05/2024  -     NIFEdipine (PROCARDIA-XL) 90 MG (OSM) 24 hr tablet; Take 1 tablet (90 mg total) by mouth once daily.  Dispense: 30 tablet; Refill: 1    Type 2 diabetes mellitus with other specified complication, without long-term current use of insulin  -     traZODone (DESYREL) 150 MG tablet; Take 1 tablet (150 mg total) by mouth every evening.  Dispense: 30 tablet; Refill: 2  -     CBC Auto Differential; Future; Expected date: 06/04/2024  -     Comprehensive Metabolic Panel; Future; Expected date: 06/04/2024  -     Hemoglobin A1C; Future; Expected date: 06/04/2024  -     T4, Free; Future; Expected date: 06/04/2024  -      TSH; Future; Expected date: 06/04/2024  -     Urinalysis, Reflex to Urine Culture Urine, Clean Catch; Future  -     Iron, TIBC and Ferritin Panel; Future; Expected date: 06/05/2024  -     NIFEdipine (PROCARDIA-XL) 90 MG (OSM) 24 hr tablet; Take 1 tablet (90 mg total) by mouth once daily.  Dispense: 30 tablet; Refill: 1  -     Ambulatory referral/consult to Nutrition Services; Future; Expected date: 06/11/2024    Obesity, unspecified classification, unspecified obesity type, unspecified whether serious comorbidity present  -     Ambulatory referral/consult to Nutrition Services; Future; Expected date: 06/11/2024            Increased Procardia to 90 mg daily, patient has 1 week follow-up.    Labs pending for chronic conditions.         Total time spent with patient in room, charting, direct an  indirect patient care equals 40 minutes    -Patient instructed that our office calls back on all labs and imaging within 1 week of receiving the results. Patient instructed to reach out to our office if they have not heard from us so that we can request the results from the lab/imaging center           Pushpa Kinsey PA-C

## 2024-06-05 ENCOUNTER — TELEPHONE (OUTPATIENT)
Dept: PRIMARY CARE CLINIC | Facility: CLINIC | Age: 44
End: 2024-06-05
Payer: MEDICARE

## 2024-06-05 ENCOUNTER — PATIENT OUTREACH (OUTPATIENT)
Dept: ADMINISTRATIVE | Facility: HOSPITAL | Age: 44
End: 2024-06-05
Payer: MEDICARE

## 2024-06-05 VITALS — SYSTOLIC BLOOD PRESSURE: 134 MMHG | DIASTOLIC BLOOD PRESSURE: 88 MMHG

## 2024-06-05 LAB
%TRANSFERRIN SATURATION: 30.6 % (ref 20–50)
ABS NRBC COUNT: 0 X 10 3/UL (ref 0–0.01)
ABSOLUTE BASOPHIL: 0.04 X 10 3/UL (ref 0–0.22)
ABSOLUTE EOSINOPHIL: 0.13 X 10 3/UL (ref 0.04–0.54)
ABSOLUTE IMMATURE GRAN: 0.09 X 10 3/UL (ref 0–0.04)
ABSOLUTE LYMPHOCYTE: 2.33 X 10 3/UL (ref 0.86–4.75)
ABSOLUTE MONOCYTE: 0.58 X 10 3/UL (ref 0.22–1.08)
ALBUMIN SERPL-MCNC: 3.6 G/DL (ref 3.5–5.2)
ALBUMIN/GLOB SERPL ELPH: 0.8 {RATIO} (ref 1–2.7)
ALP ISOS SERPL LEV INH-CCNC: 99 U/L (ref 35–105)
ALT (SGPT): 15 U/L (ref 0–33)
AMORPH URATE CRY URNS QL MICRO: ABNORMAL
ANION GAP SERPL CALC-SCNC: 13 MMOL/L (ref 8–17)
AST SERPL-CCNC: 22 U/L (ref 0–32)
BACTERIA #/AREA URNS HPF: ABNORMAL /[HPF]
BASOPHILS NFR BLD: 0.4 % (ref 0.2–1.2)
BILIRUB UR QL STRIP: NEGATIVE
BILIRUBIN, TOTAL: 0.24 MG/DL (ref 0–1.2)
BUN/CREAT SERPL: 17.8 (ref 6–20)
CALCIUM SERPL-MCNC: 9.2 MG/DL (ref 8.6–10.2)
CARBON DIOXIDE, CO2: 15 MMOL/L (ref 22–29)
CHLORIDE: 110 MMOL/L (ref 98–107)
CLARITY UR: ABNORMAL
COLOR UR: YELLOW
CREAT SERPL-MCNC: 1.28 MG/DL (ref 0.5–0.9)
EOSINOPHIL NFR BLD: 1.2 % (ref 0.7–7)
EPITHELIAL CELLS: ABNORMAL
ESTIMATED AVERAGE GLUCOSE: 110 MG/DL
FERRITIN: 153 NG/ML (ref 10–232)
GFR ESTIMATION: 53.31 ML/MIN/1.73M2
GLOBULIN: 4.6 G/DL (ref 1.5–4.5)
GLUCOSE (UA): NEGATIVE MG/DL
GLUCOSE: 133 MG/DL (ref 74–106)
HBA1C MFR BLD: 5.5 % (ref 4–6)
HCT VFR BLD AUTO: 39.4 % (ref 37–47)
HGB BLD-MCNC: 12.7 G/DL (ref 12–16)
HYALINE CASTS #/AREA URNS LPF: ABNORMAL /[LPF]
IMMATURE GRANULOCYTES: 0.9 % (ref 0–0.5)
IRON BINDING CAPACITY: 278 UG/DL (ref 262–472)
IRON SERPL-MCNC: 85 UG/DL (ref 37–145)
KETONES UR QL STRIP: NEGATIVE MG/DL
LEUKOCYTE ESTERASE UR QL STRIP: NEGATIVE
LYMPHOCYTES NFR BLD: 22.2 % (ref 19.3–53.1)
MCH RBC QN AUTO: 31.4 PG (ref 27–32)
MCHC RBC AUTO-ENTMCNC: 32.2 G/DL (ref 32–36)
MCV RBC AUTO: 97.3 FL (ref 82–100)
MONOCYTES NFR BLD: 5.5 % (ref 4.7–12.5)
MUCOUS THREADS URNS QL MICRO: ABNORMAL
NEUTROPHILS # BLD AUTO: 7.32 X 10 3/UL (ref 2.15–7.56)
NEUTROPHILS NFR BLD: 69.8 % (ref 34–71.1)
NITRITE UR QL STRIP: NEGATIVE
NUCLEATED RED BLOOD CELLS: 0 /100 WBC (ref 0–0.2)
OCCULT BLOOD: ABNORMAL
PH, URINE: 5 (ref 5–7.5)
PLATELET # BLD AUTO: 281 X 10 3/UL (ref 135–400)
POTASSIUM: 4.4 MMOL/L (ref 3.5–5.1)
PROT SNV-MCNC: 8.2 G/DL (ref 6.4–8.3)
PROT UR QL STRIP: 500 MG/DL
RBC # BLD AUTO: 4.05 X 10 6/UL (ref 4.2–5.4)
RBC/HPF: ABNORMAL
RDW-SD: 43.1 FL (ref 37–54)
SODIUM: 138 MMOL/L (ref 136–145)
SP GR UR STRIP: 1.02 (ref 1–1.03)
T4, FREE: 1.29 NG/DL (ref 0.93–1.7)
TSH SERPL DL<=0.005 MIU/L-ACNC: 2.51 UIU/ML (ref 0.27–4.2)
UIBC SERPL-MCNC: 193 UG/DL (ref 112–306)
UREA NITROGEN (BUN): 22.8 MG/DL (ref 6–20)
UROBILINOGEN, URINE: NORMAL E.U./DL (ref 0–1)
WBC # BLD: 10.49 X 10 3/UL (ref 4.3–10.8)
WBC/HPF: ABNORMAL

## 2024-06-11 ENCOUNTER — OFFICE VISIT (OUTPATIENT)
Dept: PRIMARY CARE CLINIC | Facility: CLINIC | Age: 44
End: 2024-06-11
Payer: MEDICARE

## 2024-06-11 VITALS
HEIGHT: 60 IN | WEIGHT: 253 LBS | SYSTOLIC BLOOD PRESSURE: 124 MMHG | OXYGEN SATURATION: 98 % | DIASTOLIC BLOOD PRESSURE: 84 MMHG | RESPIRATION RATE: 15 BRPM | HEART RATE: 104 BPM | BODY MASS INDEX: 49.67 KG/M2

## 2024-06-11 DIAGNOSIS — E11.69 TYPE 2 DIABETES MELLITUS WITH OTHER SPECIFIED COMPLICATION, WITHOUT LONG-TERM CURRENT USE OF INSULIN: Primary | ICD-10-CM

## 2024-06-11 DIAGNOSIS — F41.1 GENERALIZED ANXIETY DISORDER: ICD-10-CM

## 2024-06-11 DIAGNOSIS — Z12.31 ENCOUNTER FOR SCREENING MAMMOGRAM FOR MALIGNANT NEOPLASM OF BREAST: ICD-10-CM

## 2024-06-11 DIAGNOSIS — E86.0 DEHYDRATION: ICD-10-CM

## 2024-06-11 DIAGNOSIS — E66.01 CLASS 3 SEVERE OBESITY WITH SERIOUS COMORBIDITY AND BODY MASS INDEX (BMI) OF 45.0 TO 49.9 IN ADULT, UNSPECIFIED OBESITY TYPE: ICD-10-CM

## 2024-06-11 DIAGNOSIS — N76.0 ACUTE VAGINITIS: ICD-10-CM

## 2024-06-11 DIAGNOSIS — N28.9 RENAL INSUFFICIENCY: ICD-10-CM

## 2024-06-11 DIAGNOSIS — E03.9 HYPOTHYROIDISM, UNSPECIFIED TYPE: ICD-10-CM

## 2024-06-11 DIAGNOSIS — R76.8 POSITIVE ANA (ANTINUCLEAR ANTIBODY): ICD-10-CM

## 2024-06-11 DIAGNOSIS — Z12.39 ENCOUNTER FOR SCREENING FOR MALIGNANT NEOPLASM OF BREAST, UNSPECIFIED SCREENING MODALITY: ICD-10-CM

## 2024-06-11 DIAGNOSIS — I10 PRIMARY HYPERTENSION: ICD-10-CM

## 2024-06-11 DIAGNOSIS — E78.5 HYPERLIPIDEMIA, UNSPECIFIED HYPERLIPIDEMIA TYPE: ICD-10-CM

## 2024-06-11 DIAGNOSIS — G47.00 INSOMNIA, UNSPECIFIED TYPE: ICD-10-CM

## 2024-06-11 PROBLEM — E66.813 CLASS 3 SEVERE OBESITY WITH SERIOUS COMORBIDITY AND BODY MASS INDEX (BMI) OF 45.0 TO 49.9 IN ADULT: Status: ACTIVE | Noted: 2020-01-27

## 2024-06-11 LAB
BILIRUB SERPL-MCNC: NORMAL MG/DL
BLOOD, POC UA: NORMAL
GLUCOSE UR QL STRIP: NORMAL
KETONES UR QL STRIP: NORMAL
LEUKOCYTE ESTERASE URINE, POC: NORMAL
NITRITE, POC UA: NORMAL
PH, POC UA: NORMAL
PROTEIN, POC: NORMAL
SPECIFIC GRAVITY, POC UA: NORMAL
UROBILINOGEN, POC UA: NORMAL

## 2024-06-11 PROCEDURE — 99214 OFFICE O/P EST MOD 30 MIN: CPT | Mod: S$GLB,,, | Performed by: PHYSICIAN ASSISTANT

## 2024-06-11 PROCEDURE — 3008F BODY MASS INDEX DOCD: CPT | Mod: CPTII,S$GLB,, | Performed by: PHYSICIAN ASSISTANT

## 2024-06-11 PROCEDURE — 3044F HG A1C LEVEL LT 7.0%: CPT | Mod: CPTII,S$GLB,, | Performed by: PHYSICIAN ASSISTANT

## 2024-06-11 PROCEDURE — 3079F DIAST BP 80-89 MM HG: CPT | Mod: CPTII,S$GLB,, | Performed by: PHYSICIAN ASSISTANT

## 2024-06-11 PROCEDURE — 81003 URINALYSIS AUTO W/O SCOPE: CPT | Mod: QW,S$GLB,, | Performed by: FAMILY MEDICINE

## 2024-06-11 PROCEDURE — 3074F SYST BP LT 130 MM HG: CPT | Mod: CPTII,S$GLB,, | Performed by: PHYSICIAN ASSISTANT

## 2024-06-11 PROCEDURE — 1159F MED LIST DOCD IN RCRD: CPT | Mod: CPTII,S$GLB,, | Performed by: PHYSICIAN ASSISTANT

## 2024-06-11 RX ORDER — NIFEDIPINE 90 MG/1
90 TABLET, EXTENDED RELEASE ORAL DAILY
Qty: 90 TABLET | Refills: 1 | Status: SHIPPED | OUTPATIENT
Start: 2024-06-11 | End: 2024-12-08

## 2024-06-11 RX ORDER — FLUCONAZOLE 150 MG/1
150 TABLET ORAL DAILY
Qty: 2 TABLET | Refills: 0 | Status: SHIPPED | OUTPATIENT
Start: 2024-06-11 | End: 2024-06-13

## 2024-06-11 RX ORDER — TRAZODONE HYDROCHLORIDE 150 MG/1
150 TABLET ORAL NIGHTLY
Qty: 90 TABLET | Refills: 1 | Status: SHIPPED | OUTPATIENT
Start: 2024-06-11 | End: 2024-12-08

## 2024-06-11 NOTE — PROGRESS NOTES
Subjective:      Patient ID: Erica Law is a 44 y.o. female.    Chief Complaint: LAB REVIEW (LAB REVIEW COMPLETED ON 06/06/) and Follow-up (PT IS WANTING SOMETHING FOR DRY MOUTH )      HPI  Patient is here today for follow-up, new complaints, lab review.      BUN and Cr-elevated on most recent lab work, proteinuria and trace blood on UA.  She does have history of chronic kidney disease.  Admits that it has been quite sometime since she has seen Nephrology.  Will refer today  Proteinuria and trace blood  Referring to nephrology    New complaint-reports burning with urination.  Most recent UA was without obvious signs of infection.  Recheck in clinic today.  Reports vaginal irritation and small amount of white discharge noted, patient reports she is not sexually active.    Follow-up-  Insomnia- doing well since increasing to 150mg trazodone   HTN now well controlled on procardia   Chronic-   DM2- on mounjaro , denies s/s of highs or lows.    A1c well controlled   Hypothyroidism- on synthroid, thyroid function tests within normal limits   HLD- on statin  Obesity-she is working on diet, she has been referred to dietary  Wellness-  Mammo ordered  Review of Systems   Constitutional:  Negative for activity change, appetite change, chills, diaphoresis, fatigue and fever.   Respiratory:  Negative for cough, choking, chest tightness and shortness of breath.    Cardiovascular:  Negative for chest pain and leg swelling.   Gastrointestinal:  Negative for abdominal distention, abdominal pain, blood in stool, constipation, diarrhea, nausea, vomiting and reflux.   Genitourinary:  Positive for dysuria. Negative for bladder incontinence, decreased urine volume, difficulty urinating, enuresis, flank pain, frequency, hematuria, nocturia, pelvic pain, urgency, vaginal bleeding, vaginal discharge, vaginal pain and vaginal dryness.   Musculoskeletal:  Negative for arthralgias, back pain, leg pain and myalgias.       Medication List  with Changes/Refills   New Medications    FLUCONAZOLE (DIFLUCAN) 150 MG TAB    Take 1 tablet (150 mg total) by mouth once daily. Take one day, repeat in 3 days if not resolved for 2 days    TIRZEPATIDE 12.5 MG/0.5 ML PNIJ    Inject 12.5 mg into the skin every 7 days.   Current Medications    AMITRIPTYLINE (ELAVIL) 25 MG TABLET    Take 25 mg by mouth every evening.    ARIPIPRAZOLE (ABILIFY) 10 MG TAB    Take 10 mg by mouth every evening.    ATORVASTATIN (LIPITOR) 40 MG TABLET    TAKE 1 TABLET BY MOUTH EVERY DAY    GABAPENTIN (NEURONTIN) 300 MG CAPSULE    TAKE 1 CAPSULE BY MOUTH THREE TIMES A DAY    IRON 100 PLUS TAB    TAKE 1 TABLET BY MOUTH TWICE A DAY    LEVOTHYROXINE (SYNTHROID) 50 MCG TABLET    TAKE 1 TABLET BY MOUTH EVERY DAY BEFORE BREAKFAST    MELOXICAM (MOBIC) 15 MG TABLET    Take 15 mg by mouth.    TOPIRAMATE (TOPAMAX) 25 MG TABLET    TAKE 1 TABLET BY MOUTH EVERY MORNING & 3 TABLETS BY MOUTH AT BEDTIME   Changed and/or Refilled Medications    Modified Medication Previous Medication    NIFEDIPINE (PROCARDIA-XL) 90 MG (OSM) 24 HR TABLET NIFEdipine (PROCARDIA-XL) 90 MG (OSM) 24 hr tablet       Take 1 tablet (90 mg total) by mouth once daily.    Take 1 tablet (90 mg total) by mouth once daily.    TRAZODONE (DESYREL) 150 MG TABLET traZODone (DESYREL) 150 MG tablet       Take 1 tablet (150 mg total) by mouth every evening.    Take 1 tablet (150 mg total) by mouth every evening.   Discontinued Medications    TIRZEPATIDE (MOUNJARO) 10 MG/0.5 ML PNIJ    INJECT 10 MG INTO THE SKIN EVERY 7 DAYS        Objective:     Vitals:    06/11/24 1414   BP: 124/84   Pulse: 104   Resp: 15   SpO2: 98%   Weight: 114.8 kg (253 lb)   Height: 5' (1.524 m)        Physical Exam  Constitutional:       Appearance: Normal appearance. She is obese.   HENT:      Head: Normocephalic and atraumatic.      Nose: Nose normal.   Eyes:      Conjunctiva/sclera: Conjunctivae normal.      Comments: Eyes tracking normal on exam    Cardiovascular:       Rate and Rhythm: Normal rate and regular rhythm.      Pulses: Normal pulses.      Heart sounds: Normal heart sounds. No murmur heard.     No friction rub. No gallop.   Pulmonary:      Effort: Pulmonary effort is normal. No respiratory distress.      Breath sounds: Normal breath sounds. No stridor. No wheezing, rhonchi or rales.   Genitourinary:     Comments: Patient declines  Musculoskeletal:      Right lower leg: No edema.      Left lower leg: No edema.      Comments: Moves all extremities well and with good control  Normal gait observed      Skin:     General: Skin is warm and dry.      Capillary Refill: Capillary refill takes less than 2 seconds.   Neurological:      Mental Status: She is alert and oriented to person, place, and time.   Psychiatric:         Mood and Affect: Mood normal.         Behavior: Behavior normal.         Thought Content: Thought content normal.         Judgment: Judgment normal.            Assessment & Plan:     Type 2 diabetes mellitus with other specified complication, without long-term current use of insulin  -     tirzepatide 12.5 mg/0.5 mL PnIj; Inject 12.5 mg into the skin every 7 days.  Dispense: 2 mL; Refill: 2  -     NIFEdipine (PROCARDIA-XL) 90 MG (OSM) 24 hr tablet; Take 1 tablet (90 mg total) by mouth once daily.  Dispense: 90 tablet; Refill: 1  -     traZODone (DESYREL) 150 MG tablet; Take 1 tablet (150 mg total) by mouth every evening.  Dispense: 90 tablet; Refill: 1    Encounter for screening for malignant neoplasm of breast, unspecified screening modality  -     Mammo Digital Screening Bilat; Future; Expected date: 06/12/2024    Encounter for screening mammogram for malignant neoplasm of breast  -     Mammo Digital Screening Bilat; Future; Expected date: 06/12/2024    Insomnia, unspecified type  -     NIFEdipine (PROCARDIA-XL) 90 MG (OSM) 24 hr tablet; Take 1 tablet (90 mg total) by mouth once daily.  Dispense: 90 tablet; Refill: 1  -     traZODone (DESYREL) 150 MG tablet;  Take 1 tablet (150 mg total) by mouth every evening.  Dispense: 90 tablet; Refill: 1    Hyperlipidemia, unspecified hyperlipidemia type  -     NIFEdipine (PROCARDIA-XL) 90 MG (OSM) 24 hr tablet; Take 1 tablet (90 mg total) by mouth once daily.  Dispense: 90 tablet; Refill: 1  -     traZODone (DESYREL) 150 MG tablet; Take 1 tablet (150 mg total) by mouth every evening.  Dispense: 90 tablet; Refill: 1    Generalized anxiety disorder  -     NIFEdipine (PROCARDIA-XL) 90 MG (OSM) 24 hr tablet; Take 1 tablet (90 mg total) by mouth once daily.  Dispense: 90 tablet; Refill: 1  -     traZODone (DESYREL) 150 MG tablet; Take 1 tablet (150 mg total) by mouth every evening.  Dispense: 90 tablet; Refill: 1    Positive SHELLY (antinuclear antibody)  -     NIFEdipine (PROCARDIA-XL) 90 MG (OSM) 24 hr tablet; Take 1 tablet (90 mg total) by mouth once daily.  Dispense: 90 tablet; Refill: 1  -     traZODone (DESYREL) 150 MG tablet; Take 1 tablet (150 mg total) by mouth every evening.  Dispense: 90 tablet; Refill: 1    Renal insufficiency  -     NIFEdipine (PROCARDIA-XL) 90 MG (OSM) 24 hr tablet; Take 1 tablet (90 mg total) by mouth once daily.  Dispense: 90 tablet; Refill: 1  -     traZODone (DESYREL) 150 MG tablet; Take 1 tablet (150 mg total) by mouth every evening.  Dispense: 90 tablet; Refill: 1  -     Comprehensive Metabolic Panel; Future; Expected date: 06/11/2024  -     Ambulatory referral/consult to Nephrology; Future; Expected date: 06/18/2024    Dehydration  -     NIFEdipine (PROCARDIA-XL) 90 MG (OSM) 24 hr tablet; Take 1 tablet (90 mg total) by mouth once daily.  Dispense: 90 tablet; Refill: 1  -     traZODone (DESYREL) 150 MG tablet; Take 1 tablet (150 mg total) by mouth every evening.  Dispense: 90 tablet; Refill: 1    Primary hypertension  -     NIFEdipine (PROCARDIA-XL) 90 MG (OSM) 24 hr tablet; Take 1 tablet (90 mg total) by mouth once daily.  Dispense: 90 tablet; Refill: 1  -     traZODone (DESYREL) 150 MG tablet; Take 1  tablet (150 mg total) by mouth every evening.  Dispense: 90 tablet; Refill: 1    Hypothyroidism, unspecified type  -     NIFEdipine (PROCARDIA-XL) 90 MG (OSM) 24 hr tablet; Take 1 tablet (90 mg total) by mouth once daily.  Dispense: 90 tablet; Refill: 1  -     traZODone (DESYREL) 150 MG tablet; Take 1 tablet (150 mg total) by mouth every evening.  Dispense: 90 tablet; Refill: 1    Acute vaginitis  Comments:  Return to clinic if not improving or worsening at any time  Orders:  -     fluconazole (DIFLUCAN) 150 MG Tab; Take 1 tablet (150 mg total) by mouth once daily. Take one day, repeat in 3 days if not resolved for 2 days  Dispense: 2 tablet; Refill: 0    Class 3 severe obesity with serious comorbidity and body mass index (BMI) of 45.0 to 49.9 in adult, unspecified obesity type  Comments:  Follow-up with dietary counseling       Two-month follow-up    -Patient instructed that our office calls back on all labs and imaging within 1 week of receiving the results. Patient instructed to reach out to our office if they have not heard from us so that we can request the results from the lab/imaging center           Pushpa Kinsey PA-C

## 2024-06-17 DIAGNOSIS — N92.1 MENORRHAGIA WITH IRREGULAR CYCLE: ICD-10-CM

## 2024-06-17 DIAGNOSIS — Z30.09 STERILIZATION CONSULT: Primary | ICD-10-CM

## 2024-07-22 ENCOUNTER — TELEPHONE (OUTPATIENT)
Dept: PRIMARY CARE CLINIC | Facility: CLINIC | Age: 44
End: 2024-07-22
Payer: MEDICARE

## 2024-08-02 ENCOUNTER — PATIENT MESSAGE (OUTPATIENT)
Dept: PRIMARY CARE CLINIC | Facility: CLINIC | Age: 44
End: 2024-08-02
Payer: MEDICARE

## 2024-08-02 DIAGNOSIS — F17.200 TOBACCO DEPENDENCE: Primary | ICD-10-CM

## 2024-08-05 ENCOUNTER — PATIENT MESSAGE (OUTPATIENT)
Dept: FAMILY MEDICINE | Facility: CLINIC | Age: 44
End: 2024-08-05
Payer: MEDICARE

## 2024-08-05 DIAGNOSIS — E11.69 TYPE 2 DIABETES MELLITUS WITH OTHER SPECIFIED COMPLICATION, WITHOUT LONG-TERM CURRENT USE OF INSULIN: Primary | ICD-10-CM

## 2024-08-05 DIAGNOSIS — E78.5 HYPERLIPIDEMIA, UNSPECIFIED HYPERLIPIDEMIA TYPE: ICD-10-CM

## 2024-08-05 DIAGNOSIS — E03.9 HYPOTHYROIDISM, UNSPECIFIED TYPE: ICD-10-CM

## 2024-08-12 ENCOUNTER — TELEPHONE (OUTPATIENT)
Dept: SMOKING CESSATION | Facility: CLINIC | Age: 44
End: 2024-08-12
Payer: MEDICARE

## 2024-08-12 NOTE — TELEPHONE ENCOUNTER
Spoke with patient regarding scheduled [facility] appointment.  Patient rescheduled appointment due to family illness.  Appointment rescheduled for 8/19/2024 @ 2pm.

## 2024-08-19 ENCOUNTER — CLINICAL SUPPORT (OUTPATIENT)
Dept: SMOKING CESSATION | Facility: CLINIC | Age: 44
End: 2024-08-19

## 2024-08-19 DIAGNOSIS — F17.200 NICOTINE DEPENDENCE: Primary | ICD-10-CM

## 2024-08-19 RX ORDER — DIVALPROEX SODIUM 500 MG/1
500 TABLET, FILM COATED, EXTENDED RELEASE ORAL DAILY
COMMUNITY

## 2024-08-19 RX ORDER — RISPERIDONE 3 MG/1
TABLET ORAL DAILY
COMMUNITY

## 2024-08-19 RX ORDER — BENZTROPINE MESYLATE 1 MG/1
1 TABLET ORAL DAILY
COMMUNITY

## 2024-08-19 RX ORDER — RISPERIDONE 2 MG/1
2 TABLET ORAL DAILY
COMMUNITY

## 2024-08-19 RX ORDER — IBUPROFEN 200 MG
1 TABLET ORAL DAILY
Qty: 28 PATCH | Refills: 0 | Status: SHIPPED | OUTPATIENT
Start: 2024-08-19

## 2024-08-19 RX ORDER — QUETIAPINE FUMARATE 100 MG/1
TABLET, FILM COATED ORAL DAILY
COMMUNITY

## 2024-08-19 RX ORDER — MICONAZOLE NITRATE 2 %
2 CREAM (GRAM) TOPICAL
Qty: 200 EACH | Refills: 0 | Status: SHIPPED | OUTPATIENT
Start: 2024-08-19

## 2024-08-21 ENCOUNTER — OFFICE VISIT (OUTPATIENT)
Dept: PRIMARY CARE CLINIC | Facility: CLINIC | Age: 44
End: 2024-08-21
Payer: MEDICARE

## 2024-08-21 VITALS
OXYGEN SATURATION: 99 % | RESPIRATION RATE: 18 BRPM | WEIGHT: 269 LBS | BODY MASS INDEX: 52.81 KG/M2 | HEART RATE: 98 BPM | DIASTOLIC BLOOD PRESSURE: 76 MMHG | HEIGHT: 60 IN | SYSTOLIC BLOOD PRESSURE: 138 MMHG

## 2024-08-21 DIAGNOSIS — R60.1 GENERALIZED EDEMA: ICD-10-CM

## 2024-08-21 DIAGNOSIS — E11.69 TYPE 2 DIABETES MELLITUS WITH OTHER SPECIFIED COMPLICATION, WITHOUT LONG-TERM CURRENT USE OF INSULIN: ICD-10-CM

## 2024-08-21 DIAGNOSIS — R60.9 EDEMA, UNSPECIFIED TYPE: ICD-10-CM

## 2024-08-21 DIAGNOSIS — M79.89 LEFT LEG SWELLING: ICD-10-CM

## 2024-08-21 DIAGNOSIS — N17.9 ACUTE KIDNEY INJURY: ICD-10-CM

## 2024-08-21 DIAGNOSIS — Z09 HOSPITAL DISCHARGE FOLLOW-UP: Primary | ICD-10-CM

## 2024-08-21 DIAGNOSIS — N18.32 STAGE 3B CHRONIC KIDNEY DISEASE: ICD-10-CM

## 2024-08-21 DIAGNOSIS — M79.89 RIGHT LEG SWELLING: ICD-10-CM

## 2024-08-21 PROCEDURE — 1159F MED LIST DOCD IN RCRD: CPT | Mod: CPTII,S$GLB,, | Performed by: PHYSICIAN ASSISTANT

## 2024-08-21 PROCEDURE — 99215 OFFICE O/P EST HI 40 MIN: CPT | Mod: S$GLB,,, | Performed by: PHYSICIAN ASSISTANT

## 2024-08-21 PROCEDURE — 3008F BODY MASS INDEX DOCD: CPT | Mod: CPTII,S$GLB,, | Performed by: PHYSICIAN ASSISTANT

## 2024-08-21 PROCEDURE — 3066F NEPHROPATHY DOC TX: CPT | Mod: CPTII,S$GLB,, | Performed by: PHYSICIAN ASSISTANT

## 2024-08-21 PROCEDURE — 3062F POS MACROALBUMINURIA REV: CPT | Mod: CPTII,S$GLB,, | Performed by: PHYSICIAN ASSISTANT

## 2024-08-21 PROCEDURE — 3075F SYST BP GE 130 - 139MM HG: CPT | Mod: CPTII,S$GLB,, | Performed by: PHYSICIAN ASSISTANT

## 2024-08-21 PROCEDURE — 3078F DIAST BP <80 MM HG: CPT | Mod: CPTII,S$GLB,, | Performed by: PHYSICIAN ASSISTANT

## 2024-08-21 PROCEDURE — 3044F HG A1C LEVEL LT 7.0%: CPT | Mod: CPTII,S$GLB,, | Performed by: PHYSICIAN ASSISTANT

## 2024-08-21 RX ORDER — DAPAGLIFLOZIN 5 MG/1
5 TABLET, FILM COATED ORAL DAILY
Qty: 30 TABLET | Refills: 2 | Status: CANCELLED | OUTPATIENT
Start: 2024-08-21 | End: 2024-11-19

## 2024-08-21 RX ORDER — HYDROCHLOROTHIAZIDE 12.5 MG/1
12.5 TABLET ORAL DAILY
Qty: 3 TABLET | Refills: 0 | Status: SHIPPED | OUTPATIENT
Start: 2024-08-21 | End: 2024-08-24

## 2024-08-21 NOTE — PROGRESS NOTES
"Subjective:      Patient ID: Erica Law is a 44 y.o. female.    Chief Complaint: Follow-up      HPI  Pt is here today for hospital follow-up-  Patient went to [facility] Emergency room on August 19, 2024 for left-sided abdominal pain and lower extremity swelling.    Patient had CT which was unremarkable per patient, I have not been able to review these results.  She did have significantly worsening kidney function.  BUN 27, creatinine 1.76, GFR 38.  These are significantly worsened from her baseline.  Patient was discharged same day from the emergency room.She had follow-up labs done for me yesterday.    These results reflect her renal function 2 months ago and yesterday.  BUN-28-->33  Creatinine 1.28--> 1.4  GFR 53--> 47    Also of note patient reports she moved and lost most of her psychiatric medication.  Patient reports she "psychiatric emergency, went to ER and was placed on a new medication regimen.  These medications have been changed in her chart.  I do not no any new med nephrotoxic drugs, but medication changes could be contributing to her worsening kidney.    Today patient reports with fluid retention, 16 lb since her last visit\ and bilateral lower extremity swelling.  She has been referred by myself to Nephrology in the past, patient has not established care with specialty yet.  She denies any shortness of breath, chest pain, cough, orthopnea.    Review of Systems   Constitutional:  Positive for unexpected weight change. Negative for activity change, appetite change, chills, diaphoresis and fatigue.   Eyes:  Negative for visual disturbance.   Respiratory:  Negative for cough, chest tightness, shortness of breath and wheezing.    Cardiovascular:  Positive for leg swelling. Negative for chest pain and palpitations.   Gastrointestinal:  Negative for abdominal pain, constipation, nausea and vomiting.   Neurological:  Negative for dizziness, vertigo, tremors, syncope, weakness, " light-headedness, numbness and headaches.   Psychiatric/Behavioral:  Negative for agitation, behavioral problems, confusion, decreased concentration, dysphoric mood, hallucinations, self-injury, sleep disturbance and suicidal ideas. The patient is not nervous/anxious and is not hyperactive.        Medication List with Changes/Refills   New Medications    HYDROCHLOROTHIAZIDE (HYDRODIURIL) 12.5 MG TAB    Take 1 tablet (12.5 mg total) by mouth once daily. for 3 days   Current Medications    AMITRIPTYLINE (ELAVIL) 25 MG TABLET    Take 25 mg by mouth every evening.    ARIPIPRAZOLE (ABILIFY) 10 MG TAB    Take 10 mg by mouth every evening.    ATORVASTATIN (LIPITOR) 40 MG TABLET    TAKE 1 TABLET BY MOUTH EVERY DAY    BENZTROPINE (COGENTIN) 1 MG TABLET    Take 1 mg by mouth once daily.    DIVALPROEX ER (DEPAKOTE ER) 500 MG TB24 24 HR TABLET    Take 500 mg by mouth once daily.    GABAPENTIN (NEURONTIN) 300 MG CAPSULE    TAKE 1 CAPSULE BY MOUTH THREE TIMES A DAY    IRON 100 PLUS TAB    TAKE 1 TABLET BY MOUTH TWICE A DAY    LEVOTHYROXINE (SYNTHROID) 50 MCG TABLET    TAKE 1 TABLET BY MOUTH EVERY DAY BEFORE BREAKFAST    NICOTINE (NICODERM CQ) 21 MG/24 HR    Place 1 patch onto the skin once daily.    NICOTINE, POLACRILEX, (NICORETTE) 2 MG GUM    Take 1 each (2 mg total) by mouth as needed (Do not use more than 10 pieces in 24 hours).    NIFEDIPINE (PROCARDIA-XL) 90 MG (OSM) 24 HR TABLET    Take 1 tablet (90 mg total) by mouth once daily.    QUETIAPINE (SEROQUEL) 100 MG TAB    Take by mouth once daily.    RISPERIDONE (RISPERDAL) 2 MG TABLET    Take 2 mg by mouth once daily.    RISPERIDONE (RISPERDAL) 3 MG TAB    Take by mouth once daily.    TIRZEPATIDE 12.5 MG/0.5 ML PNIJ    Inject 12.5 mg into the skin every 7 days.    TOPIRAMATE (TOPAMAX) 25 MG TABLET    TAKE 1 TABLET BY MOUTH EVERY MORNING & 3 TABLETS BY MOUTH AT BEDTIME    TRAZODONE (DESYREL) 150 MG TABLET    Take 1 tablet (150 mg total) by mouth every evening.   Discontinued  Medications    MELOXICAM (MOBIC) 15 MG TABLET    Take 15 mg by mouth.        Objective:     Vitals:    08/21/24 1412   BP: 138/76   Pulse: 98   Resp: 18   SpO2: 99%   Weight: 122 kg (269 lb)   Height: 5' (1.524 m)        Physical Exam  Constitutional:       Appearance: Normal appearance. She is obese.   HENT:      Head: Normocephalic and atraumatic.      Nose: Nose normal.   Eyes:      Conjunctiva/sclera: Conjunctivae normal.      Comments: Eyes tracking normal on exam    Cardiovascular:      Rate and Rhythm: Normal rate and regular rhythm.      Pulses: Normal pulses.      Heart sounds: Normal heart sounds. No murmur heard.     No friction rub. No gallop.   Pulmonary:      Effort: Pulmonary effort is normal. No respiratory distress.      Breath sounds: Normal breath sounds. No stridor. No wheezing, rhonchi or rales.   Musculoskeletal:      Right lower leg: Edema present.      Left lower leg: Edema (Bilateral lower extremity edema, nonpitting) present.      Comments: Moves all extremities well and with good control  Normal gait observed      Skin:     General: Skin is warm and dry.      Capillary Refill: Capillary refill takes less than 2 seconds.   Neurological:      Mental Status: She is alert and oriented to person, place, and time.   Psychiatric:         Mood and Affect: Mood normal.         Behavior: Behavior normal.         Thought Content: Thought content normal.         Judgment: Judgment normal.            Assessment & Plan:     Hospital discharge follow-up  Comments:  See dictation    Stage 3b chronic kidney disease  Comments:  See dictation    Acute kidney injury  Comments:  Improved since ER visit    Left leg swelling  -     VAS US Venous Leg Left; Future  -     Echo; Future    Right leg swelling  -     VAS US Venous Leg Right; Future  -     Echo; Future    Edema, unspecified type  -     VAS US Venous Leg Left; Future  -     Echo; Future  -     hydroCHLOROthiazide (HYDRODIURIL) 12.5 MG Tab; Take 1 tablet  (12.5 mg total) by mouth once daily. for 3 days  Dispense: 3 tablet; Refill: 0    Type 2 diabetes mellitus with other specified complication, without long-term current use of insulin  Comments:  Continue current diabetic meds for now.  If patient's is stable next week will add Farxiga for renal protection    Generalized edema  -     VAS US Venous Leg Right; Future  -     Echo; Future         Spoke with patient frankly about the need to establish care with Nephrology as soon as possible to preserve remaining kidney function.  I encouraged her to increase her water intake.  Avoid nephrotoxic drugs.  Stop all NSAIDs.  Consulted with Dr. Palacios, patient is going to take HCTZ for 3 days.  She is going to follow up with this clinic early next week, she will have a repeat BMP done at that time.  Today I have ordered an outpatient echo, bilateral ultrasounds of lower extremity to rule out DVT.    Keep psych meds the same for now, many of these medications caused significant weight gain, if this occurs, will switch back to old regimen.    Patient called today and was able to get appointment soon with Nephrology    -Patient instructed that our office calls back on all labs and imaging within 1 week of receiving the results. Patient instructed to reach out to our office if they have not heard from us so that we can request the results from the lab/imaging center           Pushpa Kinsey PA-C      Total time spent with patient in room, charting, review of emergency room record, MD consult, direct and indirect patient care equals 60 minute

## 2024-08-26 ENCOUNTER — OFFICE VISIT (OUTPATIENT)
Dept: PRIMARY CARE CLINIC | Facility: CLINIC | Age: 44
End: 2024-08-26
Payer: MEDICARE

## 2024-08-26 VITALS
WEIGHT: 270.19 LBS | OXYGEN SATURATION: 99 % | HEIGHT: 60 IN | BODY MASS INDEX: 53.05 KG/M2 | SYSTOLIC BLOOD PRESSURE: 128 MMHG | RESPIRATION RATE: 18 BRPM | DIASTOLIC BLOOD PRESSURE: 86 MMHG | HEART RATE: 96 BPM

## 2024-08-26 DIAGNOSIS — N18.32 STAGE 3B CHRONIC KIDNEY DISEASE: Primary | ICD-10-CM

## 2024-08-26 DIAGNOSIS — R60.0 LOWER EXTREMITY EDEMA: ICD-10-CM

## 2024-08-26 PROCEDURE — 3044F HG A1C LEVEL LT 7.0%: CPT | Mod: CPTII,S$GLB,, | Performed by: PHYSICIAN ASSISTANT

## 2024-08-26 PROCEDURE — 99215 OFFICE O/P EST HI 40 MIN: CPT | Mod: S$GLB,,, | Performed by: PHYSICIAN ASSISTANT

## 2024-08-26 PROCEDURE — 3074F SYST BP LT 130 MM HG: CPT | Mod: CPTII,S$GLB,, | Performed by: PHYSICIAN ASSISTANT

## 2024-08-26 PROCEDURE — 3079F DIAST BP 80-89 MM HG: CPT | Mod: CPTII,S$GLB,, | Performed by: PHYSICIAN ASSISTANT

## 2024-08-26 PROCEDURE — 3008F BODY MASS INDEX DOCD: CPT | Mod: CPTII,S$GLB,, | Performed by: PHYSICIAN ASSISTANT

## 2024-08-26 PROCEDURE — 3066F NEPHROPATHY DOC TX: CPT | Mod: CPTII,S$GLB,, | Performed by: PHYSICIAN ASSISTANT

## 2024-08-26 PROCEDURE — 1159F MED LIST DOCD IN RCRD: CPT | Mod: CPTII,S$GLB,, | Performed by: PHYSICIAN ASSISTANT

## 2024-08-26 PROCEDURE — 3062F POS MACROALBUMINURIA REV: CPT | Mod: CPTII,S$GLB,, | Performed by: PHYSICIAN ASSISTANT

## 2024-08-26 RX ORDER — FUROSEMIDE 20 MG/1
20 TABLET ORAL DAILY
COMMUNITY

## 2024-08-26 NOTE — PROGRESS NOTES
Subjective:      Patient ID: Erica Law is a 44 y.o. female.    Chief Complaint: Follow-up      HPI    Prior chart reviewed. Pt is unsure why she is here for follow up today. At prior visit, she had ultrasound of bilateral LE (was not notified of these results), ordered Echo (scheduled 8/28), Given Rx of HCTZ 12.5mg x 3 days (she did not get this filled, states she was not notified this was being called out). She did follow up with Nephrology (Dr. Bahena- has been established with him for the past 2 years), who instructed her that her renal function was stable. He gave her Rx for Furosemide 20mg daily and scheduled 6 month follow up. She has been on Lasix for 3-4 days, reports some improvement in LE edema. No other complaints today. Here today for repeat BMP per chart review.      Review of Systems   Constitutional:  Negative for activity change, appetite change, chills, fatigue and fever.   HENT:  Negative for nasal congestion, facial swelling and rhinorrhea.    Eyes:  Negative for pain and visual disturbance.   Respiratory:  Negative for cough, chest tightness and shortness of breath.    Cardiovascular:  Positive for leg swelling. Negative for chest pain, palpitations and claudication.   Gastrointestinal:  Negative for abdominal distention, abdominal pain, anal bleeding, blood in stool, constipation, diarrhea, nausea, vomiting and reflux.   Endocrine: Negative for polydipsia and polyuria.   Genitourinary:  Negative for difficulty urinating, flank pain and frequency.   Musculoskeletal:  Negative for arthralgias and back pain.   Neurological:  Negative for dizziness, weakness, light-headedness, numbness and headaches.   Psychiatric/Behavioral:  Negative for self-injury, sleep disturbance and suicidal ideas. The patient is not nervous/anxious.         Medication List with Changes/Refills   Current Medications    AMITRIPTYLINE (ELAVIL) 25 MG TABLET    Take 25 mg by mouth every evening.    ARIPIPRAZOLE (ABILIFY) 10  MG TAB    Take 10 mg by mouth every evening.    ATORVASTATIN (LIPITOR) 40 MG TABLET    TAKE 1 TABLET BY MOUTH EVERY DAY    BENZTROPINE (COGENTIN) 1 MG TABLET    Take 1 mg by mouth once daily.    DIVALPROEX ER (DEPAKOTE ER) 500 MG TB24 24 HR TABLET    Take 500 mg by mouth once daily.    FUROSEMIDE (LASIX) 20 MG TABLET    Take 20 mg by mouth once daily.    GABAPENTIN (NEURONTIN) 300 MG CAPSULE    TAKE 1 CAPSULE BY MOUTH THREE TIMES A DAY    LEVOTHYROXINE (SYNTHROID) 50 MCG TABLET    TAKE 1 TABLET BY MOUTH EVERY DAY BEFORE BREAKFAST    NICOTINE (NICODERM CQ) 21 MG/24 HR    Place 1 patch onto the skin once daily.    NICOTINE, POLACRILEX, (NICORETTE) 2 MG GUM    Take 1 each (2 mg total) by mouth as needed (Do not use more than 10 pieces in 24 hours).    NIFEDIPINE (PROCARDIA-XL) 90 MG (OSM) 24 HR TABLET    Take 1 tablet (90 mg total) by mouth once daily.    QUETIAPINE (SEROQUEL) 100 MG TAB    Take by mouth once daily.    RISPERIDONE (RISPERDAL) 2 MG TABLET    Take 2 mg by mouth once daily.    RISPERIDONE (RISPERDAL) 3 MG TAB    Take by mouth once daily.    TIRZEPATIDE 12.5 MG/0.5 ML PNIJ    Inject 12.5 mg into the skin every 7 days.    TOPIRAMATE (TOPAMAX) 25 MG TABLET    TAKE 1 TABLET BY MOUTH EVERY MORNING & 3 TABLETS BY MOUTH AT BEDTIME    TRAZODONE (DESYREL) 150 MG TABLET    Take 1 tablet (150 mg total) by mouth every evening.   Discontinued Medications    HYDROCHLOROTHIAZIDE (HYDRODIURIL) 12.5 MG TAB    Take 1 tablet (12.5 mg total) by mouth once daily. for 3 days    IRON 100 PLUS TAB    TAKE 1 TABLET BY MOUTH TWICE A DAY        Review of patient's allergies indicates:  No Known Allergies    Objective:     Vitals:    08/26/24 1311   BP: 128/86   Pulse: 96   Resp: 18   SpO2: 99%   Weight: 122.6 kg (270 lb 3.2 oz)   Height: 5' (1.524 m)        Physical Exam  Constitutional:       Appearance: Normal appearance.   HENT:      Head: Normocephalic and atraumatic.   Eyes:      Extraocular Movements: Extraocular movements  intact.      Conjunctiva/sclera: Conjunctivae normal.      Pupils: Pupils are equal, round, and reactive to light.   Cardiovascular:      Rate and Rhythm: Normal rate and regular rhythm.      Pulses: Normal pulses.   Pulmonary:      Effort: Pulmonary effort is normal.      Breath sounds: Normal breath sounds.   Musculoskeletal:      Cervical back: Normal range of motion and neck supple.      Right lower leg: Edema present.      Left lower leg: Edema present.      Comments: +1 pitting edema to bilateral LE   Skin:     General: Skin is warm and dry.   Neurological:      General: No focal deficit present.      Mental Status: She is alert and oriented to person, place, and time.   Psychiatric:         Mood and Affect: Mood normal.         Behavior: Behavior normal.            Assessment & Plan:     Stage 3b chronic kidney disease  Comments:  Recheck BMP today.   Continue q 6 month follow up with Nephrology.  Avoid nephrotoxic agents.  Orders:  -     Cancel: Basic Metabolic Panel; Future; Expected date: 08/26/2024  -     Basic Metabolic Panel; Future; Expected date: 08/26/2024    Lower extremity edema  Comments:  See dictation.    Continue Lasix 20mg.   Consider changing Nifedline, as this could be potential of worsening LE edema.   Consider decreasing Gabapentin, as this could also be contributing to worsenign LE edema.     RTC in 1 month or sooner PRN    I spent a total of 43 minutes on the day of the visit. This includes face to face time and non-face to face time preparing to see the patient (eg, review of tests), obtaining and/or reviewing separately obtained history, documenting clinical information in the electronic or other health record, independently interpreting results and communicating results to the patient/family/caregiver, or care coordinator.      Haven Clark PA-C    -Patient instructed that our office calls back on all labs and imaging within 1 week of receiving the results. Patient instructed to  reach out to our office if they have not heard from us so that we can request the results from the lab/imaging center    Disclaimer: This note may have been prepared using voice recognition software, it may have not been extensively proofed, as such there could be errors within the text such as sound alike errors.

## 2024-08-27 ENCOUNTER — CLINICAL SUPPORT (OUTPATIENT)
Dept: SMOKING CESSATION | Facility: CLINIC | Age: 44
End: 2024-08-27

## 2024-08-27 DIAGNOSIS — F17.200 NICOTINE DEPENDENCE: Primary | ICD-10-CM

## 2024-08-27 NOTE — PROGRESS NOTES
Individual Follow-Up Form    8/27/2024    Quit Date: TBD    Clinical Status of Patient: Outpatient    Length of Service: 30 minutes    Continuing Medication: yes  Patches    Comments: Spoke with patient at length via phone regarding tobacco cessation progress. Patient is currently waiting to receive her prescribed tobacco cessation medication 21mg nicotine patch at this time.  Patient states that she continues to vape at the same rate.  Patient will attempt to vape less this week.   Reviewed strategies, habitual behavior, stress, and high risk situations. Introduced stress with addition interventions, SOLVE, relaxation with interventions, nutrition, exercise, weight gain, and the importance of rewarding oneself for accomplishments toward becoming nicotine free.  Counselor will provide patient with nicotine patches. Open discussion of all items with interventions.  Patient appeared to be receptive to the information given.  Counselor will continue to motivate patient to be nicotine free.    Diagnosis: F17.200    Next Visit: 1 week

## 2024-09-05 ENCOUNTER — PATIENT MESSAGE (OUTPATIENT)
Dept: PRIMARY CARE CLINIC | Facility: CLINIC | Age: 44
End: 2024-09-05
Payer: MEDICARE

## 2024-09-24 DIAGNOSIS — E78.5 HYPERLIPIDEMIA, UNSPECIFIED HYPERLIPIDEMIA TYPE: Primary | ICD-10-CM

## 2024-09-28 DIAGNOSIS — E03.9 HYPOTHYROIDISM, UNSPECIFIED TYPE: ICD-10-CM

## 2024-09-30 RX ORDER — LEVOTHYROXINE SODIUM 50 UG/1
50 TABLET ORAL
Qty: 30 TABLET | Refills: 1 | Status: SHIPPED | OUTPATIENT
Start: 2024-09-30

## 2024-09-30 RX ORDER — TIRZEPATIDE 12.5 MG/.5ML
INJECTION, SOLUTION SUBCUTANEOUS
Qty: 4 PEN | Refills: 1 | Status: SHIPPED | OUTPATIENT
Start: 2024-09-30

## 2024-10-01 ENCOUNTER — OFFICE VISIT (OUTPATIENT)
Dept: OBSTETRICS AND GYNECOLOGY | Facility: CLINIC | Age: 44
End: 2024-10-01
Payer: MEDICARE

## 2024-10-01 VITALS
BODY MASS INDEX: 52.19 KG/M2 | HEIGHT: 60 IN | SYSTOLIC BLOOD PRESSURE: 124 MMHG | DIASTOLIC BLOOD PRESSURE: 78 MMHG | WEIGHT: 265.81 LBS

## 2024-10-01 DIAGNOSIS — N94.6 DYSMENORRHEA: ICD-10-CM

## 2024-10-01 DIAGNOSIS — N92.0 MENORRHAGIA WITH REGULAR CYCLE: Primary | ICD-10-CM

## 2024-10-01 DIAGNOSIS — Z30.9 ENCOUNTER FOR CONTRACEPTIVE MANAGEMENT, UNSPECIFIED TYPE: ICD-10-CM

## 2024-10-01 RX ORDER — HYDROCHLOROTHIAZIDE 12.5 MG/1
12.5 TABLET ORAL DAILY
COMMUNITY

## 2024-10-01 RX ORDER — GABAPENTIN 800 MG/1
800 TABLET ORAL DAILY
COMMUNITY

## 2024-10-01 RX ORDER — BUSPIRONE HYDROCHLORIDE 5 MG/1
TABLET ORAL
COMMUNITY
Start: 2024-09-25

## 2024-10-01 RX ORDER — ARIPIPRAZOLE 300 MG
300 KIT INTRAMUSCULAR
COMMUNITY
Start: 2024-09-12

## 2024-10-01 NOTE — PROGRESS NOTES
Chief Complaint     Gynecologic Exam (Here for establishment. Would like to discuss getting a tubal. LMP 10/01/24. Last pap 24 NIL. Denies manual breast exam today. )    HPI:     Patient is a 44 y.o.  presents today today as a new patient to discuss contraception, sterilization options.  Patient says she was on Depo-Provera for 10 years for contraception and control of bleeding due to a history of hidradenitis suppurativa that was worse during her menstrual cycle.  She has had surgeries for the hidradenitis and has removed all of the chronic abscesses.  She was stopped the Depo-Provera about 6 months ago.  Menses have returned and an now outcome about every 4 weeks.  Menses last 7 days induration or very heavy.  She goes through 4-5 pads per day occasionally saturating pads.  Dysmenorrhea severe.  She was completed childbearing.    Gyn History:    Menstrual History  Cycle: Yes (LMP 10/01/24)  Menarche Age: 15 years  Flow Duration: 7  Flow: (!) Heavy  Interval: 28  Intermenstrual Bleeding: No  Dysmenorrhea: Yes  Dysmenorrhea Severity : (!) Severe    Menopause  Menopause Age: 1 years    Pap History  Last pap date: 24  Result: Normal  History of Abnormal Pap: No  HPV Vaccine Completed: No (0/3)    [place]  Sexually Active: Yes  Sexual Orientation: heterosexual  Postcoital Bleeding: No  Dyspareunia: No  STI History: No  Contraception: No    Breast History  Last Breast Imaging Date: Yes  Date: 22 (negative)  History of Abnormal Breast Imaging : No  History of Breast Biopsy: No          Past Medical History:   Diagnosis Date    Anxiety     Bipolar disorder     Diabetes mellitus     Hydradenitis     Hypertension     Staph infection 2001    Thyroid disease        Past Surgical History:   Procedure Laterality Date    ABCESS DRAINAGE      from the breast     BREAST CYST EXCISION       SECTION         Family History   Problem Relation Name Age of Onset    Cancer Maternal Grandmother       Cancer Maternal Grandfather      Diabetes Mellitus Mother      Breast cancer Neg Hx      Colon cancer Neg Hx      Cervical cancer Neg Hx      Ovarian cancer Neg Hx      Uterine cancer Neg Hx         OB History          2    Para   1    Term   1            AB   1    Living   1         SAB   1    IAB        Ectopic        Multiple        Live Births   1                 Current Outpatient Medications on File Prior to Visit   Medication Sig Dispense Refill    ABILIFY MAINTENA 300 mg injection (pre-filled dual chamber) Inject 300 mg into the muscle every 30 days.      atorvastatin (LIPITOR) 40 MG tablet TAKE 1 TABLET BY MOUTH EVERY DAY 90 tablet 1    busPIRone (BUSPAR) 5 MG Tab       gabapentin (NEURONTIN) 800 MG tablet Take 800 mg by mouth once daily.      hydroCHLOROthiazide (HYDRODIURIL) 12.5 MG Tab Take 12.5 mg by mouth once daily.      levothyroxine (SYNTHROID) 50 MCG tablet TAKE 1 TABLET BY MOUTH EVERY DAY 30 tablet 1    NIFEdipine (PROCARDIA-XL) 90 MG (OSM) 24 hr tablet Take 1 tablet (90 mg total) by mouth once daily. 90 tablet 1    tirzepatide (MOUNJARO) 12.5 mg/0.5 mL PnIj INJECT 12.5 MG INTO THE SKIN EVERY 7 DAYS. 4 Pen 1    [DISCONTINUED] amitriptyline (ELAVIL) 25 MG tablet Take 25 mg by mouth every evening. (Patient not taking: Reported on 10/1/2024)      [DISCONTINUED] ARIPiprazole (ABILIFY) 10 MG Tab Take 10 mg by mouth every evening. (Patient not taking: Reported on 10/1/2024)      [DISCONTINUED] benztropine (COGENTIN) 1 MG tablet Take 1 mg by mouth once daily. (Patient not taking: Reported on 10/1/2024)      [DISCONTINUED] divalproex ER (DEPAKOTE ER) 500 MG Tb24 24 hr tablet Take 500 mg by mouth once daily. (Patient not taking: Reported on 10/1/2024)      [DISCONTINUED] furosemide (LASIX) 20 MG tablet Take 20 mg by mouth once daily. (Patient not taking: Reported on 10/1/2024)      [DISCONTINUED] gabapentin (NEURONTIN) 300 MG capsule TAKE 1 CAPSULE BY MOUTH THREE TIMES A DAY (Patient not  taking: Reported on 10/1/2024) 90 capsule 3    [DISCONTINUED] nicotine (NICODERM CQ) 21 mg/24 hr Place 1 patch onto the skin once daily. (Patient not taking: Reported on 10/1/2024) 28 patch 0    [DISCONTINUED] nicotine, polacrilex, (NICORETTE) 2 mg Gum Take 1 each (2 mg total) by mouth as needed (Do not use more than 10 pieces in 24 hours). (Patient not taking: Reported on 10/1/2024) 200 each 0    [DISCONTINUED] QUEtiapine (SEROQUEL) 100 MG Tab Take by mouth once daily. (Patient not taking: Reported on 10/1/2024)      [DISCONTINUED] risperiDONE (RISPERDAL) 2 MG tablet Take 2 mg by mouth once daily. (Patient not taking: Reported on 10/1/2024)      [DISCONTINUED] risperiDONE (RISPERDAL) 3 MG Tab Take by mouth once daily. (Patient not taking: Reported on 10/1/2024)      [DISCONTINUED] topiramate (TOPAMAX) 25 MG tablet TAKE 1 TABLET BY MOUTH EVERY MORNING & 3 TABLETS BY MOUTH AT BEDTIME (Patient not taking: Reported on 10/1/2024)      [DISCONTINUED] traZODone (DESYREL) 150 MG tablet Take 1 tablet (150 mg total) by mouth every evening. (Patient not taking: Reported on 10/1/2024) 90 tablet 1     Current Facility-Administered Medications on File Prior to Visit   Medication Dose Route Frequency Provider Last Rate Last Admin    LIDOcaine HCL 10 mg/ml (1%) injection 2.1 mL  2.1 mL Intramuscular Once Jill Palacios MD        sodium chloride 0.9% bolus 1,000 mL  1,000 mL Intravenous 1 time in Clinic/HOD Pushpa Kinsey PA-C           Review of Systems:       Review of Systems   Constitutional:  Negative for chills and fever.   Gastrointestinal:  Negative for abdominal pain, constipation and diarrhea.   Genitourinary:  Negative for bladder incontinence, decreased libido, dysmenorrhea, dyspareunia, dysuria, flank pain, frequency, genital sores, hematuria, hot flashes, menorrhagia, menstrual problem, pelvic pain, urgency, vaginal bleeding, vaginal discharge, vaginal pain, urinary incontinence, postcoital bleeding,  postmenopausal bleeding, vaginal dryness and vaginal odor.        Physical Exam:    /78 (BP Location: Right arm, Patient Position: Sitting)   Ht 5' (1.524 m)   Wt 120.6 kg (265 lb 12.8 oz)   LMP 10/01/2024 (Exact Date)   BMI 51.91 kg/m²     Physical Exam   General Exam:    General Appearance: alert, in no acute distress, normal, well nourished.  Psych:  Orientation: time, place, person.  Mood/Affect: Normal       Assessment:   1. Menorrhagia with regular cycle    2. Dysmenorrhea    3. Encounter for contraceptive management, unspecified type    4. BMI 50.0-59.9, adult             Plan:   Discussed her symptoms in detail.    Patient was interested in surgical management for sterilization and control of bleeding.  I have discussed the primary limitation for surgery for her being her BMI of nearly 52.  This would make access to the pelvis, visualization, and ventilation difficult and potentially dangerous with increased risk of complicated surgery and surgical injury.  For these reasons I recommend against surgical intervention at this time.  Patient voices understanding.    Discussed with patient contraceptive options including natural family planning, barrier, oral contraceptives, patch, NuvaRing, Depo-Provera, Nexplanon, IUDs, abstinence.     Recommend Mirena IUD or progestin only contraceptive pill.    Patient desires: Mirena IUD   Will RTC for insertion pending insurance verification    We discussed strategies for weight loss.  I recommend calorie restricted diet with maximum caloric intake of 7561-3826 calories per day.  I recommend use of calorie tracking ailyn such as my fitness pal or lose it to help achieve daily caloric goals.  Recommend exercise 3-5 times per week as tolerated.    RTC IUD insertion     This note was transcribed by Heather Fernandez. There may be transcription errors as a result, however minimal. Effort has been made to ensure accuracy of transcription, but any obvious errors or omissions  should be clarified with the author of the document.       I agree with the above documentation.

## 2024-10-01 NOTE — PROGRESS NOTES
History & Physical    Subjective     History of Present Illness:  Patient is a 44 y.o. female     Chief Complaint   Patient presents with    Gynecologic Exam       Review of patient's allergies indicates:  No Known Allergies    Current Outpatient Medications   Medication Sig Dispense Refill    ABILIFY MAINTENA 300 mg injection (pre-filled dual chamber) Inject 300 mg into the muscle every 30 days.      atorvastatin (LIPITOR) 40 MG tablet TAKE 1 TABLET BY MOUTH EVERY DAY 90 tablet 1    busPIRone (BUSPAR) 5 MG Tab       gabapentin (NEURONTIN) 800 MG tablet Take 800 mg by mouth once daily.      hydroCHLOROthiazide (HYDRODIURIL) 12.5 MG Tab Take 12.5 mg by mouth once daily.      levothyroxine (SYNTHROID) 50 MCG tablet TAKE 1 TABLET BY MOUTH EVERY DAY 30 tablet 1    NIFEdipine (PROCARDIA-XL) 90 MG (OSM) 24 hr tablet Take 1 tablet (90 mg total) by mouth once daily. 90 tablet 1    tirzepatide (MOUNJARO) 12.5 mg/0.5 mL PnIj INJECT 12.5 MG INTO THE SKIN EVERY 7 DAYS. 4 Pen 1     Current Facility-Administered Medications   Medication Dose Route Frequency Provider Last Rate Last Admin    LIDOcaine HCL 10 mg/ml (1%) injection 2.1 mL  2.1 mL Intramuscular Once Jill Palacios MD        sodium chloride 0.9% bolus 1,000 mL  1,000 mL Intravenous 1 time in Clinic/HOD Pushpa Kinsey PA-C           Past Medical History:   Diagnosis Date    Anxiety     Bipolar disorder     Diabetes mellitus     Hydradenitis     Hypertension     Staph infection     Thyroid disease      Past Surgical History:   Procedure Laterality Date    ABCESS DRAINAGE      from the breast     BREAST CYST EXCISION       SECTION       Family History   Problem Relation Name Age of Onset    Cancer Maternal Grandmother      Cancer Maternal Grandfather      Diabetes Mellitus Mother      Breast cancer Neg Hx      Colon cancer Neg Hx      Cervical cancer Neg Hx      Ovarian cancer Neg Hx      Uterine cancer Neg Hx       Social History     Tobacco Use     Smoking status: Every Day     Types: Vaping with nicotine    Smokeless tobacco: Never   Substance Use Topics    Alcohol use: Not Currently    Drug use: Never        Review of Systems:  Review of Systems   Respiratory: Negative.     Cardiovascular: Negative.    Gastrointestinal: Negative.    Genitourinary: Negative.           Objective     Vital Signs (Most Recent)  BP: 124/78 (10/01/24 1332)  5' (1.524 m)  120.6 kg (265 lb 12.8 oz)     Physical Exam:  Physical Exam  Physical Exam  Gen: NAD  Cardio: RRR  Lungs CTA b/l  Abd: Soft, NT  Ext: no CCE         Assessment and Plan   ***    PLAN:    {:20505}         There are no diagnoses linked to this encounter.    Discussed permanent sterilization in detail and alternative contraceptive options.  We discussed bilateral tubal ligation and LTC.   Discussed risks of both sterilization including surgical risks, risk of failure, risk of ectopic pregnancy, and risk of regret.   Pt wants to proceed with surgery.  Consent given  Plan for procedure on   NPO after midnight before surgery    RTC post op     This note was transcribed by Heather Fernandez. There may be transcription errors as a result, however minimal. Effort has been made to ensure accuracy of transcription, but any obvious errors or omissions should be clarified with the author of the document.       I agree with the above documentation.

## 2024-10-22 NOTE — PROGRESS NOTES
Chief Complaint:  Contraception (Mirena IUD insertion)    History of Present Illness:   Erica Law is a 44 y.o.  who presents today for Mirena IUD placement.       Gyn History:    Menstrual History  Cycle: Yes (LMP:10/13/24)  Menarche Age: 15 years  Flow Duration: 7  Flow: (!) Heavy  Interval: 28  Intermenstrual Bleeding: Yes (Only 1 time)  Dysmenorrhea: Yes  Dysmenorrhea Severity : Moderate    Menopause  Menopause Age: 1 years    Pap History  Last pap date: 24  Result: Normal  History of Abnormal Pap: No  HPV Vaccine Completed: No    [place]  Sexually Active: Yes  Sexual Orientation: heterosexual  Postcoital Bleeding: No  Dyspareunia: No  STI History: No  Contraception: No    Breast History  Last Breast Imaging Date: Yes  Date:  ( per pt negative)  History of Abnormal Breast Imaging : No  History of Breast Biopsy: No         Review of Systems:  General/Constitutional: Chills denies. Fatigue/weakness denies. Fever denies . Night sweats denies . Hot flashes denies  Gynecologic: Irregular menses denies.  Heavy bleeding  denies.  Painful menses denies.  Vaginal discharge denies. Vaginal odor denies. Vaginal itching/Irritation denies. Vaginal lesion denies.  Pelvic pain denies. Decreased libido denies. Vulvar lesion denies. Prolapse of genital organs denies. Painful intercourse denies. Postcoital bleeding denies   Psychiatric: Mood lability denies.  Depressed mood denies. Suicidal thoughts denies. Anxiety denies.  Overwhelmed denies.  Appetite normal. Energy level normal      OB History    Para Term  AB Living   2 1 1 0 1 1   SAB IAB Ectopic Multiple Live Births   1 0 0 0 1      # 1 - Date: None, Sex: None, Weight: None, GA: None, Type: None, Apgar1: None, Apgar5: None, Living: None, Birth Comments: None    # 2 - Date: 01, Sex: Female, Weight: 2.977 kg (6 lb 9 oz), GA: 39w0d, Type: , Low Transverse, Apgar1: None, Apgar5: None, Living: Living, Birth Comments:  None        Current Outpatient Medications:     ABILIFY MAINTENA 300 mg injection (pre-filled dual chamber), Inject 300 mg into the muscle every 30 days., Disp: , Rfl:     atorvastatin (LIPITOR) 40 MG tablet, TAKE 1 TABLET BY MOUTH EVERY DAY, Disp: 90 tablet, Rfl: 1    busPIRone (BUSPAR) 5 MG Tab, , Disp: , Rfl:     gabapentin (NEURONTIN) 800 MG tablet, Take 800 mg by mouth once daily., Disp: , Rfl:     hydroCHLOROthiazide (HYDRODIURIL) 12.5 MG Tab, Take 12.5 mg by mouth once daily., Disp: , Rfl:     levothyroxine (SYNTHROID) 50 MCG tablet, TAKE 1 TABLET BY MOUTH EVERY DAY, Disp: 30 tablet, Rfl: 1    NIFEdipine (PROCARDIA-XL) 90 MG (OSM) 24 hr tablet, Take 1 tablet (90 mg total) by mouth once daily., Disp: 90 tablet, Rfl: 1    tirzepatide (MOUNJARO) 12.5 mg/0.5 mL PnIj, INJECT 12.5 MG INTO THE SKIN EVERY 7 DAYS., Disp: 4 Pen, Rfl: 1    Current Facility-Administered Medications:     LIDOcaine HCL 10 mg/ml (1%) injection 2.1 mL, 2.1 mL, Intramuscular, Once, Jill Palacios MD    sodium chloride 0.9% bolus 1,000 mL, 1,000 mL, Intravenous, 1 time in Clinic/HOD, Pushpa Kinsey PA-C    Physical Exam:  BP (P) 132/78 (BP Location: Right arm, Patient Position: Sitting)   Pulse (P) 88   Temp (P) 97.7 °F (36.5 °C) (Temporal)   Resp (P) 20   Ht (P) 5' (1.524 m)   Wt (P) 122 kg (269 lb)   LMP 10/13/2024 (Exact Date)   BMI (P) 52.54 kg/m²     Chaperone present.    Constitutional: General appearance: healthy, well-nourished and well-developed  Psychiatric: Orientation to time, place and person. Normal mood and affect and active, alert  Abdomen: Auscultation/Inspection/Palpation: deferred  Female Genitalia:  Vulva: no masses, atrophy or lesions  Bladder/Urethra: no urethral discharge or mass, normal meatus, bladder   non-distended.  Vagina: no tenderness, erythema, cystocele, rectocele, abnormal  vaginal discharge, or vesicle(s) or ulcers   Cervix:  Fixed high in the vaginal vault and deviated slightly to left of the  midline.  Long speculum and electronic bed at its highest setting was needed to be able to visualize the cervix  Uterus: normal size and shape and midline, non-tender, and no uterine   prolapse.  Adnexa/Parametria: no parametrial tenderness or mass, no adnexal tenderness  or ovarian mass.    Procedure:   After having reviewed the contraindications, side effects, and risks and benefits of all major options for reversible contraception, the patient chose the Mirena IUD for contraception. We discussed the risks of insertion, pelvic infection, and the possibility of failure. Patient stated that she has a good understanding of all we discussed, and all questions were answered regarding IUD use. Patient signed consent form.   Pelvic examination was normal. Pap smear is current. Urine pregnancy test negative.  With the patient in the dorsal lithotomy position, a speculum was placed in the vagina. The cervix and vagina were prepped with Betadine, the cervix was stabilized with a tenaculum, and the uterus was sounded to a depth of 9 cm. The Mirena IUD was then placed in the endometrial cavity as directed without complications. The strings were trimmed to approximately to 2 cm.  Patient tolerated procedure well.      Assessment/Plan:  1. Encounter for initial prescription of intrauterine contraceptive device (IUD)  -     POCT urine pregnancy         UPT negative, Consent given, IUD inserted, Tolerated well  Patient educated on IUD & bleeding patterns.  Backup Contraception until next visit  String check 4 weeks    Mirena IUD:  NDC: [id number]  LOT: [id number]  EXP: 10/30/2026    RTC 4 weeks      This note was transcribed by Heather Fernandez. There may be transcription errors as a result, however minimal. Effort has been made to ensure accuracy of transcription, but any obvious errors or omissions should be clarified with the author of the document.       I agree with the above documentation.

## 2024-10-23 ENCOUNTER — PROCEDURE VISIT (OUTPATIENT)
Dept: OBSTETRICS AND GYNECOLOGY | Facility: CLINIC | Age: 44
End: 2024-10-23
Payer: MEDICARE

## 2024-10-23 DIAGNOSIS — Z30.014 ENCOUNTER FOR INITIAL PRESCRIPTION OF INTRAUTERINE CONTRACEPTIVE DEVICE (IUD): Primary | ICD-10-CM

## 2024-10-23 LAB
B-HCG UR QL: NEGATIVE
CTP QC/QA: YES

## 2024-10-23 PROCEDURE — 81025 URINE PREGNANCY TEST: CPT | Mod: RHCUB | Performed by: OBSTETRICS & GYNECOLOGY

## 2024-10-24 ENCOUNTER — PATIENT MESSAGE (OUTPATIENT)
Dept: PRIMARY CARE CLINIC | Facility: CLINIC | Age: 44
End: 2024-10-24
Payer: MEDICARE

## 2024-10-24 DIAGNOSIS — N64.4 BREAST PAIN: Primary | ICD-10-CM

## 2024-10-30 LAB — BCS RECOMMENDATION EXT: NORMAL

## 2024-11-07 ENCOUNTER — TELEPHONE (OUTPATIENT)
Dept: SMOKING CESSATION | Facility: CLINIC | Age: 44
End: 2024-11-07
Payer: MEDICARE

## 2024-11-21 ENCOUNTER — OFFICE VISIT (OUTPATIENT)
Dept: OBSTETRICS AND GYNECOLOGY | Facility: CLINIC | Age: 44
End: 2024-11-21
Payer: MEDICARE

## 2024-11-21 VITALS — SYSTOLIC BLOOD PRESSURE: 126 MMHG | DIASTOLIC BLOOD PRESSURE: 84 MMHG | WEIGHT: 276 LBS | BODY MASS INDEX: 53.9 KG/M2

## 2024-11-21 DIAGNOSIS — Z30.431 IUD CHECK UP: Primary | ICD-10-CM

## 2024-11-21 PROCEDURE — 1159F MED LIST DOCD IN RCRD: CPT | Mod: ,,,

## 2024-11-21 PROCEDURE — 99213 OFFICE O/P EST LOW 20 MIN: CPT | Mod: ,,,

## 2024-11-21 PROCEDURE — 3074F SYST BP LT 130 MM HG: CPT | Mod: ,,,

## 2024-11-21 PROCEDURE — 3062F POS MACROALBUMINURIA REV: CPT | Mod: ,,,

## 2024-11-21 PROCEDURE — 3008F BODY MASS INDEX DOCD: CPT | Mod: ,,,

## 2024-11-21 PROCEDURE — 3079F DIAST BP 80-89 MM HG: CPT | Mod: ,,,

## 2024-11-21 PROCEDURE — 3044F HG A1C LEVEL LT 7.0%: CPT | Mod: ,,,

## 2024-11-21 PROCEDURE — 3066F NEPHROPATHY DOC TX: CPT | Mod: ,,,

## 2024-11-21 PROCEDURE — 1160F RVW MEDS BY RX/DR IN RCRD: CPT | Mod: ,,,

## 2024-11-21 RX ORDER — FUROSEMIDE 20 MG/1
20 TABLET ORAL
COMMUNITY
Start: 2024-11-19

## 2024-11-21 NOTE — PROGRESS NOTES
Chief Complaint: IUD String Check     HPI:      Erica Law 44 y.o.   presents for follow up after IUD placement approximately 1 month ago. Today she reports no complaints. Has been sexually active with partner since placement without problems.  Patient's last menstrual period was 10/13/2024 (exact date).     Physical Exam:     /84   Wt 125.2 kg (276 lb)   LMP 10/13/2024 (Exact Date)   BMI (P) 53.90 kg/m²   Body mass index is 53.9 kg/m² (pended).    APPEARANCE: Well nourished, well developed, in no acute distress.  PELVIC: Normal external genitalia without lesions.  Normal hair distribution.  Adequate perineal body, normal urethral meatus.  Vagina moist and well rugated without lesions or discharge.  Cervix pink, without lesions, discharge or tenderness. IUD strings visible at the cervical os.      Assessment/Plan:     IUD check up for [facility] for annual.     Counseling:     Reviewed the length of IUD efficacy, in this case 8 years.   Discussed that irregular bleeding immediately after placement is common and that this usually resolves within 1-3 months.   Counseled patient to let us know of any major changes in bleeding pattern moving forward.

## 2024-11-24 DIAGNOSIS — E03.9 HYPOTHYROIDISM, UNSPECIFIED TYPE: ICD-10-CM

## 2024-11-25 RX ORDER — LEVOTHYROXINE SODIUM 50 UG/1
50 TABLET ORAL
Qty: 90 TABLET | Refills: 1 | Status: SHIPPED | OUTPATIENT
Start: 2024-11-25

## 2024-11-26 DIAGNOSIS — E11.69 TYPE 2 DIABETES MELLITUS WITH OTHER SPECIFIED COMPLICATION, WITHOUT LONG-TERM CURRENT USE OF INSULIN: Primary | ICD-10-CM

## 2024-12-02 RX ORDER — TIRZEPATIDE 12.5 MG/.5ML
INJECTION, SOLUTION SUBCUTANEOUS
Qty: 4 PEN | Refills: 2 | Status: SHIPPED | OUTPATIENT
Start: 2024-12-02 | End: 2025-03-02

## 2024-12-16 DIAGNOSIS — F32.A DEPRESSION, UNSPECIFIED DEPRESSION TYPE: ICD-10-CM

## 2024-12-16 RX ORDER — ESCITALOPRAM OXALATE 10 MG/1
TABLET ORAL
Qty: 90 TABLET | Refills: 1 | Status: SHIPPED | OUTPATIENT
Start: 2024-12-16

## 2025-01-12 DIAGNOSIS — E78.5 HYPERLIPIDEMIA, UNSPECIFIED HYPERLIPIDEMIA TYPE: ICD-10-CM

## 2025-01-13 RX ORDER — ATORVASTATIN CALCIUM 40 MG/1
TABLET, FILM COATED ORAL
Qty: 90 TABLET | Refills: 1 | Status: SHIPPED | OUTPATIENT
Start: 2025-01-13

## 2025-01-24 DIAGNOSIS — G47.00 INSOMNIA, UNSPECIFIED TYPE: ICD-10-CM

## 2025-01-24 DIAGNOSIS — F41.1 GENERALIZED ANXIETY DISORDER: ICD-10-CM

## 2025-01-24 DIAGNOSIS — R76.8 POSITIVE ANA (ANTINUCLEAR ANTIBODY): ICD-10-CM

## 2025-01-24 DIAGNOSIS — E11.69 TYPE 2 DIABETES MELLITUS WITH OTHER SPECIFIED COMPLICATION, WITHOUT LONG-TERM CURRENT USE OF INSULIN: ICD-10-CM

## 2025-01-24 DIAGNOSIS — I10 PRIMARY HYPERTENSION: ICD-10-CM

## 2025-01-24 DIAGNOSIS — E78.5 HYPERLIPIDEMIA, UNSPECIFIED HYPERLIPIDEMIA TYPE: ICD-10-CM

## 2025-01-24 DIAGNOSIS — N28.9 RENAL INSUFFICIENCY: ICD-10-CM

## 2025-01-24 DIAGNOSIS — E86.0 DEHYDRATION: ICD-10-CM

## 2025-01-24 DIAGNOSIS — E03.9 HYPOTHYROIDISM, UNSPECIFIED TYPE: ICD-10-CM

## 2025-01-24 RX ORDER — TRAZODONE HYDROCHLORIDE 150 MG/1
150 TABLET ORAL NIGHTLY
Qty: 90 TABLET | Refills: 1 | Status: SHIPPED | OUTPATIENT
Start: 2025-01-24

## 2025-01-24 RX ORDER — NIFEDIPINE 90 MG/1
90 TABLET, EXTENDED RELEASE ORAL
Qty: 90 TABLET | Refills: 1 | Status: SHIPPED | OUTPATIENT
Start: 2025-01-24

## 2025-01-30 ENCOUNTER — OFFICE VISIT (OUTPATIENT)
Dept: PRIMARY CARE CLINIC | Facility: CLINIC | Age: 45
End: 2025-01-30
Payer: MEDICARE

## 2025-01-30 DIAGNOSIS — N28.9 RENAL INSUFFICIENCY: ICD-10-CM

## 2025-01-30 DIAGNOSIS — R63.5 WEIGHT GAIN: Primary | ICD-10-CM

## 2025-01-30 DIAGNOSIS — I10 PRIMARY HYPERTENSION: ICD-10-CM

## 2025-01-30 DIAGNOSIS — D64.9 ANEMIA, UNSPECIFIED TYPE: ICD-10-CM

## 2025-01-30 DIAGNOSIS — E11.69 TYPE 2 DIABETES MELLITUS WITH OTHER SPECIFIED COMPLICATION, WITHOUT LONG-TERM CURRENT USE OF INSULIN: ICD-10-CM

## 2025-01-30 DIAGNOSIS — N18.32 STAGE 3B CHRONIC KIDNEY DISEASE: ICD-10-CM

## 2025-01-30 NOTE — PROGRESS NOTES
Subjective:   The patient location is: [place]  The chief complaint leading to consultation is: weight gain    Visit type: audiovisual    Face to Face time with patient: 5min  10 minutes of total time spent on the encounter, which includes face to face time and non-face to face time preparing to see the patient (eg, review of tests), Obtaining and/or reviewing separately obtained history, Documenting clinical information in the electronic or other health record, Independently interpreting results (not separately reported) and communicating results to the patient/family/caregiver, or Care coordination (not separately reported).         Each patient to whom he or she provides medical services by telemedicine is:  (1) informed of the relationship between the physician and patient and the respective role of any other health care provider with respect to management of the patient; and (2) notified that he or she may decline to receive medical services by telemedicine and may withdraw from such care at any time.    Notes:         Patient ID: Erica Law is a 44 y.o. female.    Chief Complaint: No chief complaint on file.      HPI  Pt report she is gaining weight.  She reports she is counting calories and exercising and is continuing to gain wt  Due for chronic lab as well   Current wt-  283lb  Review of Systems   Constitutional:  Negative for activity change, appetite change, chills, diaphoresis, fatigue and unexpected weight change.   Eyes:  Negative for visual disturbance.   Respiratory:  Negative for cough, chest tightness, shortness of breath and wheezing.    Cardiovascular:  Negative for chest pain, palpitations and leg swelling.   Gastrointestinal:  Negative for abdominal pain, constipation, nausea and vomiting.   Neurological:  Negative for dizziness, vertigo, tremors, syncope, weakness, light-headedness, numbness and headaches.   Psychiatric/Behavioral:  Negative for agitation, behavioral problems, confusion,  decreased concentration, dysphoric mood, hallucinations, self-injury, sleep disturbance and suicidal ideas. The patient is not nervous/anxious and is not hyperactive.        Objective:   There were no vitals filed for this visit.     Physical Exam  HENT:      Head: Normocephalic and atraumatic.      Nose: Nose normal.   Eyes:      Extraocular Movements: Extraocular movements intact.   Pulmonary:      Effort: Pulmonary effort is normal.   Neurological:      Mental Status: She is alert.   Psychiatric:         Mood and Affect: Mood normal.         Thought Content: Thought content normal.         Judgment: Judgment normal.            Assessment & Plan:     Weight gain  -     CBC Auto Differential; Future; Expected date: 01/30/2025  -     Comprehensive Metabolic Panel; Future; Expected date: 01/30/2025  -     Hemoglobin A1C; Future; Expected date: 01/30/2025  -     T4, Free; Future; Expected date: 01/30/2025  -     TSH; Future; Expected date: 01/30/2025  -     Urinalysis, Reflex to Urine Culture Urine, Clean Catch; Future    Primary hypertension  -     CBC Auto Differential; Future; Expected date: 01/30/2025  -     Comprehensive Metabolic Panel; Future; Expected date: 01/30/2025  -     Hemoglobin A1C; Future; Expected date: 01/30/2025  -     T4, Free; Future; Expected date: 01/30/2025  -     TSH; Future; Expected date: 01/30/2025  -     Urinalysis, Reflex to Urine Culture Urine, Clean Catch; Future    Renal insufficiency  -     CBC Auto Differential; Future; Expected date: 01/30/2025  -     Comprehensive Metabolic Panel; Future; Expected date: 01/30/2025  -     Hemoglobin A1C; Future; Expected date: 01/30/2025  -     T4, Free; Future; Expected date: 01/30/2025  -     TSH; Future; Expected date: 01/30/2025  -     Urinalysis, Reflex to Urine Culture Urine, Clean Catch; Future    Stage 3b chronic kidney disease  -     CBC Auto Differential; Future; Expected date: 01/30/2025  -     Comprehensive Metabolic Panel; Future;  Expected date: 01/30/2025  -     Hemoglobin A1C; Future; Expected date: 01/30/2025  -     T4, Free; Future; Expected date: 01/30/2025  -     TSH; Future; Expected date: 01/30/2025  -     Urinalysis, Reflex to Urine Culture Urine, Clean Catch; Future    Anemia, unspecified type  -     CBC Auto Differential; Future; Expected date: 01/30/2025  -     Comprehensive Metabolic Panel; Future; Expected date: 01/30/2025  -     Hemoglobin A1C; Future; Expected date: 01/30/2025  -     T4, Free; Future; Expected date: 01/30/2025  -     TSH; Future; Expected date: 01/30/2025  -     Urinalysis, Reflex to Urine Culture Urine, Clean Catch; Future    Type 2 diabetes mellitus with other specified complication, without long-term current use of insulin  -     CBC Auto Differential; Future; Expected date: 01/30/2025  -     Comprehensive Metabolic Panel; Future; Expected date: 01/30/2025  -     Hemoglobin A1C; Future; Expected date: 01/30/2025  -     T4, Free; Future; Expected date: 01/30/2025  -     TSH; Future; Expected date: 01/30/2025  -     Urinalysis, Reflex to Urine Culture Urine, Clean Catch; Future         2 wk followup

## 2025-01-31 LAB
ABS NRBC COUNT: 0 X 10 3/UL (ref 0–0.01)
ABSOLUTE BASOPHIL: 0.04 X 10 3/UL (ref 0–0.22)
ABSOLUTE EOSINOPHIL: 0.12 X 10 3/UL (ref 0.04–0.54)
ABSOLUTE IMMATURE GRAN: 0.06 X 10 3/UL (ref 0–0.04)
ABSOLUTE LYMPHOCYTE: 2.2 X 10 3/UL (ref 0.86–4.75)
ABSOLUTE MONOCYTE: 0.53 X 10 3/UL (ref 0.22–1.08)
ALBUMIN SERPL-MCNC: 3.6 G/DL (ref 3.5–5.2)
ALBUMIN/GLOB SERPL ELPH: 0.8 {RATIO} (ref 1–2.7)
ALP ISOS SERPL LEV INH-CCNC: 64 U/L (ref 35–105)
ALT (SGPT): 11 U/L (ref 0–33)
AMORPH URATE CRY URNS QL MICRO: ABNORMAL
ANION GAP SERPL CALC-SCNC: 12 MMOL/L (ref 8–17)
AST SERPL-CCNC: 14 U/L (ref 0–32)
BACTERIA #/AREA URNS HPF: ABNORMAL /[HPF]
BASOPHILS NFR BLD: 0.4 % (ref 0.2–1.2)
BILIRUB UR QL STRIP: NEGATIVE
BILIRUBIN, TOTAL: 0.21 MG/DL (ref 0–1.2)
BUN/CREAT SERPL: 15.3 (ref 6–20)
CALCIUM SERPL-MCNC: 9.2 MG/DL (ref 8.6–10.2)
CARBON DIOXIDE, CO2: 21 MMOL/L (ref 22–29)
CHLORIDE: 106 MMOL/L (ref 98–107)
CLARITY UR: ABNORMAL
COLOR UR: YELLOW
CREAT SERPL-MCNC: 1.49 MG/DL (ref 0.5–0.9)
EOSINOPHIL NFR BLD: 1.2 % (ref 0.7–7)
EPITHELIAL CELLS: ABNORMAL
ESTIMATED AVERAGE GLUCOSE: 108 MG/DL (ref 70–126)
GFR ESTIMATION: 44.42 ML/MIN/1.73M2
GLOBULIN: 4.4 G/DL (ref 1.5–4.5)
GLUCOSE (UA): NEGATIVE MG/DL
GLUCOSE: 148 MG/DL (ref 74–106)
HBA1C MFR BLD: 5.4 % (ref 4–6)
HCT VFR BLD AUTO: 37.6 % (ref 37–47)
HGB BLD-MCNC: 11.6 G/DL (ref 12–16)
IMMATURE GRANULOCYTES: 0.6 % (ref 0–0.5)
KETONES UR QL STRIP: NEGATIVE MG/DL
LEUKOCYTE ESTERASE UR QL STRIP: NEGATIVE
LYMPHOCYTES NFR BLD: 22.5 % (ref 19.3–53.1)
MCH RBC QN AUTO: 30.7 PG (ref 27–32)
MCHC RBC AUTO-ENTMCNC: 30.9 G/DL (ref 32–36)
MCV RBC AUTO: 99.5 FL (ref 82–100)
MONOCYTES NFR BLD: 5.4 % (ref 4.7–12.5)
MUCOUS THREADS URNS QL MICRO: NEGATIVE
NEUTROPHILS # BLD AUTO: 6.82 X 10 3/UL (ref 2.15–7.56)
NEUTROPHILS NFR BLD: 69.9 % (ref 34–71.1)
NITRITE UR QL STRIP: NEGATIVE
NUCLEATED RED BLOOD CELLS: 0 /100 WBC (ref 0–0.2)
OCCULT BLOOD: ABNORMAL
PH, URINE: 5 (ref 5–7.5)
PLATELET # BLD AUTO: 319 X 10 3/UL (ref 135–400)
POTASSIUM: 4.8 MMOL/L (ref 3.5–5.1)
PROT SNV-MCNC: 8 G/DL (ref 6.4–8.3)
PROT UR QL STRIP: 150 MG/DL
RBC # BLD AUTO: 3.78 X 10 6/UL (ref 4.2–5.4)
RBC/HPF: ABNORMAL
RDW-SD: 47.1 FL (ref 37–54)
SODIUM: 139 MMOL/L (ref 136–145)
SP GR UR STRIP: 1.01 (ref 1–1.03)
T4, FREE: 1.17 NG/DL (ref 0.93–1.7)
TSH SERPL DL<=0.005 MIU/L-ACNC: 1.32 UIU/ML (ref 0.27–4.2)
UREA NITROGEN (BUN): 22.8 MG/DL (ref 6–20)
UROBILINOGEN, URINE: NORMAL E.U./DL (ref 0–1)
WBC # BLD: 9.77 X 10 3/UL (ref 4.3–10.8)
WBC/HPF: NEGATIVE

## 2025-02-05 ENCOUNTER — CLINICAL SUPPORT (OUTPATIENT)
Dept: SMOKING CESSATION | Facility: CLINIC | Age: 45
End: 2025-02-05

## 2025-02-05 DIAGNOSIS — F17.200 NICOTINE DEPENDENCE: Primary | ICD-10-CM

## 2025-02-05 PROCEDURE — 99999 PR PBB SHADOW E&M-EST. PATIENT-LVL I: CPT | Mod: PBBFAC,,,

## 2025-02-05 PROCEDURE — 99406 BEHAV CHNG SMOKING 3-10 MIN: CPT | Mod: ,,, | Performed by: GENERAL PRACTICE

## 2025-02-06 ENCOUNTER — OFFICE VISIT (OUTPATIENT)
Dept: PRIMARY CARE CLINIC | Facility: CLINIC | Age: 45
End: 2025-02-06
Payer: MEDICARE

## 2025-02-06 VITALS
DIASTOLIC BLOOD PRESSURE: 74 MMHG | HEIGHT: 60 IN | RESPIRATION RATE: 15 BRPM | SYSTOLIC BLOOD PRESSURE: 126 MMHG | BODY MASS INDEX: 57.52 KG/M2 | WEIGHT: 293 LBS | OXYGEN SATURATION: 99 % | HEART RATE: 90 BPM

## 2025-02-06 DIAGNOSIS — R63.5 WEIGHT GAIN: ICD-10-CM

## 2025-02-06 DIAGNOSIS — E66.01 CLASS 3 SEVERE OBESITY WITH BODY MASS INDEX (BMI) OF 50.0 TO 59.9 IN ADULT, UNSPECIFIED OBESITY TYPE, UNSPECIFIED WHETHER SERIOUS COMORBIDITY PRESENT: ICD-10-CM

## 2025-02-06 DIAGNOSIS — E11.69 TYPE 2 DIABETES MELLITUS WITH OTHER SPECIFIED COMPLICATION, WITHOUT LONG-TERM CURRENT USE OF INSULIN: Primary | ICD-10-CM

## 2025-02-06 DIAGNOSIS — E66.813 CLASS 3 SEVERE OBESITY WITH BODY MASS INDEX (BMI) OF 50.0 TO 59.9 IN ADULT, UNSPECIFIED OBESITY TYPE, UNSPECIFIED WHETHER SERIOUS COMORBIDITY PRESENT: ICD-10-CM

## 2025-02-06 PROBLEM — N28.9 RENAL INSUFFICIENCY: Status: RESOLVED | Noted: 2023-04-17 | Resolved: 2025-02-06

## 2025-02-06 PROBLEM — Z30.9 ENCOUNTER FOR CONTRACEPTIVE MANAGEMENT: Status: RESOLVED | Noted: 2024-10-01 | Resolved: 2025-02-06

## 2025-02-06 PROCEDURE — 3078F DIAST BP <80 MM HG: CPT | Mod: CPTII,,, | Performed by: PHYSICIAN ASSISTANT

## 2025-02-06 PROCEDURE — 3074F SYST BP LT 130 MM HG: CPT | Mod: CPTII,,, | Performed by: PHYSICIAN ASSISTANT

## 2025-02-06 PROCEDURE — 3008F BODY MASS INDEX DOCD: CPT | Mod: CPTII,,, | Performed by: PHYSICIAN ASSISTANT

## 2025-02-06 PROCEDURE — 3044F HG A1C LEVEL LT 7.0%: CPT | Mod: CPTII,,, | Performed by: PHYSICIAN ASSISTANT

## 2025-02-06 PROCEDURE — 99214 OFFICE O/P EST MOD 30 MIN: CPT | Mod: S$PBB,,, | Performed by: PHYSICIAN ASSISTANT

## 2025-02-06 PROCEDURE — 1159F MED LIST DOCD IN RCRD: CPT | Mod: CPTII,,, | Performed by: PHYSICIAN ASSISTANT

## 2025-02-06 RX ORDER — TIRZEPATIDE 15 MG/.5ML
15 INJECTION, SOLUTION SUBCUTANEOUS
Qty: 2 ML | Refills: 1 | Status: SHIPPED | OUTPATIENT
Start: 2025-02-06 | End: 2025-04-07

## 2025-02-06 RX ORDER — LUMATEPERONE 10.5 MG/1
10.5 CAPSULE ORAL DAILY
COMMUNITY

## 2025-02-06 RX ORDER — TOPIRAMATE 25 MG/1
25 TABLET ORAL 2 TIMES DAILY
Qty: 60 TABLET | Refills: 11 | Status: SHIPPED | OUTPATIENT
Start: 2025-02-06 | End: 2026-02-06

## 2025-02-06 NOTE — PROGRESS NOTES
Subjective:      Patient ID: Erica Law is a 44 y.o. female.    Chief Complaint: Obesity (Wgt Concerns )      HPI  Patient is here today for chronic follow-up and new complaints.  Concerned about weight gain, patient has gained 20 lb since her last visit    Labs reviewed.  BUN, creatinine, GFR stable.  Patient with CKD and has established care with Nephrology    Chronic anemia, no change    Review of Systems   Constitutional:  Negative for activity change, appetite change, chills, diaphoresis, fatigue and unexpected weight change.   Eyes:  Negative for visual disturbance.   Respiratory:  Negative for cough, chest tightness, shortness of breath and wheezing.    Cardiovascular:  Negative for chest pain, palpitations and leg swelling.   Gastrointestinal:  Negative for abdominal pain, constipation, nausea and vomiting.   Neurological:  Negative for dizziness, vertigo, tremors, syncope, weakness, light-headedness, numbness and headaches.   Psychiatric/Behavioral:  Negative for agitation, behavioral problems, confusion, decreased concentration, dysphoric mood, hallucinations, self-injury, sleep disturbance and suicidal ideas. The patient is not nervous/anxious and is not hyperactive.        Medication List with Changes/Refills   New Medications    TIRZEPATIDE (MOUNJARO) 15 MG/0.5 ML PNIJ    Inject 15 mg into the skin every 7 days.    TOPIRAMATE (TOPAMAX) 25 MG TABLET    Take 1 tablet (25 mg total) by mouth 2 (two) times daily.   Current Medications    ATORVASTATIN (LIPITOR) 40 MG TABLET    TAKE 1 TABLET BY MOUTH EVERY DAY    BUSPIRONE (BUSPAR) 5 MG TAB        FUROSEMIDE (LASIX) 20 MG TABLET    Take 20 mg by mouth.    GABAPENTIN (NEURONTIN) 800 MG TABLET    Take 800 mg by mouth once daily.    LEVOTHYROXINE (SYNTHROID) 50 MCG TABLET    TAKE 1 TABLET BY MOUTH EVERY DAY    LUMATEPERONE (CAPLYTA) 10.5 MG CAP    Take 10.5 mg by mouth once daily.   Discontinued Medications    ABILIFY MAINTENA 300 MG INJECTION (PRE-FILLED DUAL  CHAMBER)    Inject 300 mg into the muscle every 30 days.    ESCITALOPRAM OXALATE (LEXAPRO) 10 MG TABLET    TAKE 1 TABLET BY MOUTH EVERY DAY    HYDROCHLOROTHIAZIDE (HYDRODIURIL) 12.5 MG TAB    Take 12.5 mg by mouth once daily.    NIFEDIPINE (PROCARDIA-XL) 90 MG (OSM) 24 HR TABLET    TAKE 1 TABLET BY MOUTH ONCE DAILY.    TIRZEPATIDE (MOUNJARO) 12.5 MG/0.5 ML PNIJ    INJECT 12.5 MG INTO THE SKIN EVERY 7 DAYS.    TRAZODONE (DESYREL) 150 MG TABLET    TAKE 1 TABLET BY MOUTH EVERY EVENING.        Objective:     Vitals:    02/06/25 0947 02/06/25 1130   BP: 126/74    Pulse: (!) 111 90   Resp: 15    SpO2: 99%    Weight: 134.3 kg (296 lb)    Height: 5' (1.524 m)         Physical Exam  Constitutional:       Appearance: Normal appearance. She is obese.   HENT:      Head: Normocephalic and atraumatic.      Nose: Nose normal.   Eyes:      Conjunctiva/sclera: Conjunctivae normal.      Comments: Eyes tracking normal on exam    Cardiovascular:      Rate and Rhythm: Normal rate and regular rhythm.      Pulses: Normal pulses.      Heart sounds: Normal heart sounds. No murmur heard.     No friction rub. No gallop.   Pulmonary:      Effort: Pulmonary effort is normal. No respiratory distress.      Breath sounds: Normal breath sounds. No stridor. No wheezing, rhonchi or rales.   Musculoskeletal:      Right lower leg: No edema.      Left lower leg: No edema.      Comments: Moves all extremities well and with good control  Normal gait observed      Skin:     General: Skin is warm and dry.      Capillary Refill: Capillary refill takes less than 2 seconds.   Neurological:      Mental Status: She is alert and oriented to person, place, and time.   Psychiatric:         Mood and Affect: Mood normal.         Behavior: Behavior normal.         Thought Content: Thought content normal.         Judgment: Judgment normal.            Assessment & Plan:     Type 2 diabetes mellitus with other specified complication, without long-term current use of  insulin  -     tirzepatide (MOUNJARO) 15 mg/0.5 mL PnIj; Inject 15 mg into the skin every 7 days.  Dispense: 2 mL; Refill: 1    Weight gain  Comments:  Increasing Mounjaro, counseled on dietary changes.  Will do trial of Topamax as well    Class 3 severe obesity with body mass index (BMI) of 50.0 to 59.9 in adult, unspecified obesity type, unspecified whether serious comorbidity present  -     topiramate (TOPAMAX) 25 MG tablet; Take 1 tablet (25 mg total) by mouth 2 (two) times daily.  Dispense: 60 tablet; Refill: 11           One-month follow-up    -Patient instructed that our office calls back on all labs and imaging within 1 week of receiving the results. Patient instructed to reach out to our office if they have not heard from us so that we can request the results from the lab/imaging center           Pushpa Kinsey PA-C

## 2025-04-06 DIAGNOSIS — E11.69 TYPE 2 DIABETES MELLITUS WITH OTHER SPECIFIED COMPLICATION, WITHOUT LONG-TERM CURRENT USE OF INSULIN: ICD-10-CM

## 2025-04-07 RX ORDER — TIRZEPATIDE 15 MG/.5ML
15 INJECTION, SOLUTION SUBCUTANEOUS
Qty: 2 ML | Refills: 0 | Status: SHIPPED | OUTPATIENT
Start: 2025-04-07 | End: 2025-05-07

## 2025-04-15 ENCOUNTER — OFFICE VISIT (OUTPATIENT)
Dept: FAMILY MEDICINE | Facility: CLINIC | Age: 45
End: 2025-04-15
Payer: MEDICARE

## 2025-04-15 VITALS
HEIGHT: 60 IN | HEART RATE: 117 BPM | DIASTOLIC BLOOD PRESSURE: 80 MMHG | SYSTOLIC BLOOD PRESSURE: 130 MMHG | BODY MASS INDEX: 57.52 KG/M2 | TEMPERATURE: 99 F | OXYGEN SATURATION: 99 % | WEIGHT: 293 LBS | RESPIRATION RATE: 15 BRPM

## 2025-04-15 DIAGNOSIS — E11.9 TYPE 2 DIABETES MELLITUS WITHOUT COMPLICATION, WITHOUT LONG-TERM CURRENT USE OF INSULIN: Primary | ICD-10-CM

## 2025-04-15 DIAGNOSIS — E55.9 VITAMIN D DEFICIENCY: ICD-10-CM

## 2025-04-15 DIAGNOSIS — D64.9 ANEMIA, UNSPECIFIED TYPE: ICD-10-CM

## 2025-04-15 DIAGNOSIS — N28.9 RENAL INSUFFICIENCY: ICD-10-CM

## 2025-04-15 LAB
%TRANSFERRIN SATURATION: 19.4 % (ref 20–50)
FERRITIN: 197 NG/ML (ref 10–232)
IRON BINDING CAPACITY: 222 UG/DL (ref 262–472)
IRON SERPL-MCNC: 43 UG/DL (ref 37–145)
UIBC SERPL-MCNC: 179 UG/DL (ref 112–306)

## 2025-04-15 PROCEDURE — 99214 OFFICE O/P EST MOD 30 MIN: CPT | Mod: S$GLB,,, | Performed by: NURSE PRACTITIONER

## 2025-04-15 PROCEDURE — 3079F DIAST BP 80-89 MM HG: CPT | Mod: CPTII,S$GLB,, | Performed by: NURSE PRACTITIONER

## 2025-04-15 PROCEDURE — 3044F HG A1C LEVEL LT 7.0%: CPT | Mod: CPTII,S$GLB,, | Performed by: NURSE PRACTITIONER

## 2025-04-15 PROCEDURE — 1160F RVW MEDS BY RX/DR IN RCRD: CPT | Mod: CPTII,S$GLB,, | Performed by: NURSE PRACTITIONER

## 2025-04-15 PROCEDURE — 3075F SYST BP GE 130 - 139MM HG: CPT | Mod: CPTII,S$GLB,, | Performed by: NURSE PRACTITIONER

## 2025-04-15 PROCEDURE — 1159F MED LIST DOCD IN RCRD: CPT | Mod: CPTII,S$GLB,, | Performed by: NURSE PRACTITIONER

## 2025-04-15 PROCEDURE — 3008F BODY MASS INDEX DOCD: CPT | Mod: CPTII,S$GLB,, | Performed by: NURSE PRACTITIONER

## 2025-04-15 RX ORDER — BUSPIRONE HYDROCHLORIDE 7.5 MG/1
TABLET ORAL
COMMUNITY
Start: 2025-03-27

## 2025-04-15 RX ORDER — LUMATEPERONE 42 MG/1
CAPSULE ORAL
COMMUNITY
Start: 2025-03-27

## 2025-04-15 RX ORDER — NIFEDIPINE 90 MG/1
TABLET, EXTENDED RELEASE ORAL
COMMUNITY
Start: 2025-04-14

## 2025-04-15 NOTE — PROGRESS NOTES
"Patient ID: Erica Law  MRN: [id number]      History of Present Illness:  The patient is a 45 y.o. Black or  female who presents to clinic for evaluation and management with a chief complaint of Annual Exam (Pt. Here for annual.  Former pt. Of Dr. Jill Palacios/Pushpa Kinsey NP.  Last seen by you in 2023.  Wants to have you as her Primary Care Provider, updated in EMR.  No c/o today.  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 She had multiple wounds, lesions that required hospitalization however Dr. Silver performed skin grafts, using cod fish skin and pt said it was not painful and she is now not having any abscesses or wounds. She said she has not had to go to the hospital in over a year and her life is so much better. She is here to establish care with me and to review labs. Her A1c is very good, however her fasting glucose was at 148, which is high. She is on the Mounjaro 15 mg once a week, since December and said she has not lost any weight at all.     In addition she does have renal insufficiency and sees neprhology, chronic anemia, likely related to blood loss from surgeries / chronic renal disease.      Allergies: Patient has no known allergies.     Smoking status:  Tobacco Use History[1]    Problem List:  Problem List[2]     Current Medications:  Current Outpatient Medications   Medication Instructions    atorvastatin (LIPITOR) 40 MG tablet TAKE 1 TABLET BY MOUTH EVERY DAY    busPIRone (BUSPAR) 7.5 MG tablet     CAPLYTA 42 mg Cap     empagliflozin (JARDIANCE) 10 mg, Oral, Daily    furosemide (LASIX) 20 mg    gabapentin (NEURONTIN) 800 mg, Daily    levothyroxine (SYNTHROID) 50 mcg, Oral    MOUNJARO 15 mg, Subcutaneous, Every 7 days    NIFEdipine (PROCARDIA-XL) 90 MG (OSM) 24 hr tablet     topiramate (TOPAMAX) 25 mg, Oral, 2 times daily         Review of Systems   Constitutional:  Positive for unexpected weight change. Negative for activity " change.   HENT:  Negative for hearing loss, rhinorrhea and trouble swallowing.    Eyes:  Negative for discharge and visual disturbance.   Respiratory:  Negative for chest tightness and wheezing.    Cardiovascular:  Negative for chest pain and palpitations.   Gastrointestinal:  Negative for blood in stool, constipation, diarrhea and vomiting.   Endocrine: Negative for polydipsia and polyuria.   Genitourinary:  Negative for difficulty urinating, dysuria, hematuria and menstrual problem.   Musculoskeletal:  Negative for arthralgias, joint swelling and neck pain.   Neurological:  Negative for weakness and headaches.   Psychiatric/Behavioral:  Negative for confusion and dysphoric mood.         Visit Vitals  /80 (BP Location: Left arm, Patient Position: Sitting)   Pulse (!) 117   Temp 98.6 °F (37 °C) (Oral)   Resp 15   Ht 5' (1.524 m)   Wt 135.6 kg (298 lb 14.4 oz)   LMP  (LMP Unknown)   SpO2 99%   BMI 58.37 kg/m²       Physical Exam     Assessment & Plan:  1. Type 2 diabetes mellitus without complication, without long-term current use of insulin  -     empagliflozin (JARDIANCE) 10 mg tablet; Take 1 tablet (10 mg total) by mouth once daily.  Dispense: 30 tablet; Refill: 1  Reviewed her recent labs and the A1c looks very good, however her fasting glucose was elevated at 148.   She is concerned she is not losing weight from the Mounjaro and she has consistently been taking 15 mg once a week. Encouraged her to continue and we will add the jardiance that should help with both kidney and diabetes    2. Anemia, unspecified type  -     Iron, TIBC and Ferritin Panel; Future; Expected date: 04/15/2025    3. Vitamin D deficiency  -     Vitamin D; Future; Expected date: 04/15/2025      4. Renal Insufficiency   Reviewed pts recent labs and she said she does see a neprologist. She understands not to take any NSAIDs.   Starting Jardiance today and informed her this can improve her renal function as well as help her to keep  glucose levels normal.     Other orders  -     Iron, TIBC, and %Saturation  -     Ferritin         Future Appointments   Date Time Provider Department Center   7/22/2025  1:00 PM Nasreen Mcdonald NP [facility] [address]     Lab Frequency Next Occurrence   Ambulatory referral/consult to Optometry Once 06/07/2024   Ambulatory referral/consult to Nutrition Services Once 06/11/2024   Ambulatory referral/consult to Nephrology Once 06/18/2024   Ambulatory referral/consult to Obstetrics / Gynecology Once 06/24/2024   Ambulatory referral/consult to Smoking Cessation Program Once 08/09/2024   Mammo Digital Diagnostic Bilat with Artis Once 10/24/2024   US Breast Bilateral Limited Once 10/24/2024   Anti-DNA antibody, double-stranded     C3 complement     C4 complement     Urinalysis         Follow up in about 3 months (around 7/15/2025).      Nasreen Mcdonald NP                [1]   Social History  Tobacco Use   Smoking Status Every Day    Types: Vaping with nicotine    Passive exposure: Past   Smokeless Tobacco Never   [2]   Patient Active Problem List  Diagnosis    Class 3 severe obesity with serious comorbidity and body mass index (BMI) of 45.0 to 49.9 in adult    Hidradenitis suppurativa    Generalized anxiety disorder    Hypothyroidism    Hyperlipidemia    Positive SHELLY (antinuclear antibody)    Undifferentiated inflammatory arthritis    Rheumatoid arthritis of multiple sites with negative rheumatoid factor    Elevated sed rate    Elevated C-reactive protein (CRP)    Anemia    Nexplanon in place    Colitis    Hospital discharge follow-up    Fever    Dehydration    Type 2 diabetes mellitus, without long-term current use of insulin    Primary hypertension    Immunodeficiency disorder    Insomnia    Stage 3b chronic kidney disease    BMI 50.0-59.9, adult    Dysmenorrhea    Menorrhagia with regular cycle

## 2025-04-30 DIAGNOSIS — E11.69 TYPE 2 DIABETES MELLITUS WITH OTHER SPECIFIED COMPLICATION, WITHOUT LONG-TERM CURRENT USE OF INSULIN: ICD-10-CM

## 2025-04-30 RX ORDER — TIRZEPATIDE 15 MG/.5ML
15 INJECTION, SOLUTION SUBCUTANEOUS
Qty: 6 ML | Refills: 1 | Status: SHIPPED | OUTPATIENT
Start: 2025-04-30 | End: 2025-10-15

## 2025-05-12 ENCOUNTER — PATIENT MESSAGE (OUTPATIENT)
Dept: FAMILY MEDICINE | Facility: CLINIC | Age: 45
End: 2025-05-12
Payer: MEDICARE

## 2025-05-28 ENCOUNTER — TELEPHONE (OUTPATIENT)
Dept: PHARMACY | Facility: CLINIC | Age: 45
End: 2025-05-28
Payer: MEDICARE

## 2025-05-29 NOTE — TELEPHONE ENCOUNTER
[facility]/Population Health Chart Review & Patient Outreach Details For Medication Adherence Project    Reason for Outreach Encounter: 3rd Party payor non-compliance report ([employer], [employer], [employer], etc)  2.  Patient Outreach Method: Reviewed patient chart   3.   Medication in question:    Diabetes Medications              empagliflozin (JARDIANCE) 10 mg tablet Take 1 tablet (10 mg total) by mouth once daily.    tirzepatide (MOUNJARO) 15 mg/0.5 mL PnIj Inject 15 mg into the skin every 7 days.                 mounjaro  last filled  5/6 for 84 day supply      4.  Reviewed and or Updates Made To: Patient Chart  5. Outreach Outcomes and/or actions taken: Patient filled medication and is on track to be adherent  Additional Notes:

## 2025-06-05 DIAGNOSIS — E03.9 HYPOTHYROIDISM, UNSPECIFIED TYPE: ICD-10-CM

## 2025-06-05 RX ORDER — LEVOTHYROXINE SODIUM 50 UG/1
50 TABLET ORAL
Qty: 90 TABLET | Refills: 1 | Status: SHIPPED | OUTPATIENT
Start: 2025-06-05

## 2025-06-12 ENCOUNTER — ON-DEMAND VIRTUAL (OUTPATIENT)
Dept: URGENT CARE | Facility: CLINIC | Age: 45
End: 2025-06-12
Payer: MEDICARE

## 2025-06-12 DIAGNOSIS — E11.9 TYPE 2 DIABETES MELLITUS WITHOUT COMPLICATION, WITHOUT LONG-TERM CURRENT USE OF INSULIN: ICD-10-CM

## 2025-06-12 DIAGNOSIS — N89.8 VAGINAL ITCHING: Primary | ICD-10-CM

## 2025-06-12 RX ORDER — FLUCONAZOLE 150 MG/1
150 TABLET ORAL DAILY
Qty: 2 TABLET | Refills: 0 | Status: SHIPPED | OUTPATIENT
Start: 2025-06-12 | End: 2025-06-14

## 2025-06-12 NOTE — PROGRESS NOTES
Subjective:      Patient ID: Erica Law is a 45 y.o. female.    Vitals:  vitals were not taken for this visit.     Chief Complaint: Vaginal Itching      Visit Type: TELE AUDIOVISUAL - This visit was conducted virtually based on  subjective information and limited objective exam    Present with the patient at the time of consultation: TELEMED PRESENT WITH PATIENT: None  LOCATION OF PATIENT [place]  Two patient identifiers used to verify patient- saying out date of birth and full name.       Past Medical History:   Diagnosis Date    Anxiety     Bipolar disorder     Diabetes mellitus     Hydradenitis     Hypertension     Staph infection     Thyroid disease      Past Surgical History:   Procedure Laterality Date    ABCESS DRAINAGE      from the breast     BREAST CYST EXCISION       SECTION       Review of patient's allergies indicates:  No Known Allergies  Medications Ordered Prior to Encounter[1]  Family History   Problem Relation Name Age of Onset    Diabetes Mellitus Mother      Cancer Maternal Grandmother      Cancer Maternal Grandfather      Breast cancer Neg Hx      Colon cancer Neg Hx      Cervical cancer Neg Hx      Ovarian cancer Neg Hx      Uterine cancer Neg Hx         Medications Ordered                [address] [facility]    [facility] [address]    Telephone: [phone]   Fax: [phone]   Hours: Not open 24 hours                         E-Prescribed (1 of 1)              fluconazole (DIFLUCAN) 150 MG Tab    Sig: Take 1 tablet (150 mg total) by mouth once daily. Take one now and may repeat in 72 h prn for 2 doses       Start: 25     Quantity: 2 tablet Refills: 0                           No questionnaires on file.    46 yo female with c/o vaginal itching. She states symptoms started three days ago. She states she is currently on jardiance and has had frequent yeast infections. She has not tried anything for her  symptoms. Last one in may 2025        Constitution: Negative.   HENT: Negative.     Neck: neck negative.   Cardiovascular: Negative.    Eyes: Negative.    Respiratory: Negative.     Gastrointestinal: Negative.    Endocrine: negative.   Genitourinary: Negative.  Positive for vaginal odor. Negative for dysuria, frequency, urgency and urine decreased.   Musculoskeletal: Negative.    Skin: Negative.    Allergic/Immunologic: Negative.    Neurological: Negative.    Hematologic/Lymphatic: Negative.    Psychiatric/Behavioral: Negative.          Objective:   The physical exam was conducted virtually.    AAO x 3 ; no acute distress noted; appearance normal; mood and behavior normal; thought process normal  Head- normocephalic  Nose- appears normal, no discharge or erythema  Eyes- pupils appear normal in size, no drainage, no erythema  Ears- normal appearing; no discharge, no erythema  Mouth- appears normal  Oropharynx- no erythema, lesions  Lungs- breathing at a normal rate, no acute distress noted  Heart- no reports of tachycardia, palpitations, chest pain  Abdomen- non distended, non tender reported by patient  Skin- warm and dry, no erythema or edema noted by patient or visualized  Psych- as above; no si/hi      Assessment:     1. Vaginal itching        Plan:     Advised follow up for frequent yeast infections  Diflucan 150 now, may repeat in 72 h prn    Thank you for choosing [facility]!    Our goal in the [facility] is to always provide outstanding medical care. You may receive a survey by mail or e-mail in the next week regarding your experience today. We would greatly appreciate you completing and returning the survey. Your feedback provides us with a way to recognize our staff who provide very good care, and it helps us learn how to improve when your experience was below our aspiration of excellence.         We appreciate you trusting us with your medical care. We hope you feel  better soon. We will be happy to take care of you for all of your future medical needs.    You must understand that you've received an Urgent Care treatment only and that you may be released before all your medical problems are known or treated. You, the patient, will arrange for follow up care as instructed.    Follow up with your PCP or specialty clinic as directed in the next 1-2 weeks if not improved or as needed.  You can call [phone] to schedule an appointment with the appropriate provider.    If your condition worsens we recommend that you receive another evaluation in person, with your primary care provider, urgent care or at the emergency room immediately or contact your primary medical clinics after hours call service to discuss your concerns.         Vaginal itching  -     fluconazole (DIFLUCAN) 150 MG Tab; Take 1 tablet (150 mg total) by mouth once daily. Take one now and may repeat in 72 h prn for 2 doses  Dispense: 2 tablet; Refill: 0                         [1]   Current Outpatient Medications on File Prior to Visit   Medication Sig Dispense Refill    atorvastatin (LIPITOR) 40 MG tablet TAKE 1 TABLET BY MOUTH EVERY DAY 90 tablet 1    busPIRone (BUSPAR) 7.5 MG tablet       CAPLYTA 42 mg Cap       empagliflozin (JARDIANCE) 10 mg tablet Take 1 tablet (10 mg total) by mouth once daily. 30 tablet 1    furosemide (LASIX) 20 MG tablet Take 20 mg by mouth.      gabapentin (NEURONTIN) 800 MG tablet Take 800 mg by mouth once daily.      levothyroxine (SYNTHROID) 50 MCG tablet Take 1 tablet (50 mcg total) by mouth before breakfast. 90 tablet 1    NIFEdipine (PROCARDIA-XL) 90 MG (OSM) 24 hr tablet       tirzepatide (MOUNJARO) 15 mg/0.5 mL PnIj Inject 15 mg into the skin every 7 days. 6 mL 1    topiramate (TOPAMAX) 25 MG tablet Take 1 tablet (25 mg total) by mouth 2 (two) times daily. 60 tablet 11    [DISCONTINUED] empagliflozin (JARDIANCE) 10 mg tablet Take 1 tablet (10 mg total) by mouth once daily. 30  tablet 1     Current Facility-Administered Medications on File Prior to Visit   Medication Dose Route Frequency Provider Last Rate Last Admin    LIDOcaine HCL 10 mg/ml (1%) injection 2.1 mL  2.1 mL Intramuscular Once Jill Palacios MD        sodium chloride 0.9% bolus 1,000 mL  1,000 mL Intravenous 1 time in Clinic/HOD Pushpa Kinsey, PA-C

## 2025-06-19 ENCOUNTER — PATIENT MESSAGE (OUTPATIENT)
Dept: FAMILY MEDICINE | Facility: CLINIC | Age: 45
End: 2025-06-19
Payer: MEDICARE

## 2025-07-02 ENCOUNTER — OFFICE VISIT (OUTPATIENT)
Dept: FAMILY MEDICINE | Facility: CLINIC | Age: 45
End: 2025-07-02
Payer: MEDICARE

## 2025-07-02 VITALS
WEIGHT: 293 LBS | HEART RATE: 114 BPM | OXYGEN SATURATION: 98 % | BODY MASS INDEX: 57.52 KG/M2 | SYSTOLIC BLOOD PRESSURE: 140 MMHG | DIASTOLIC BLOOD PRESSURE: 84 MMHG | HEIGHT: 60 IN | RESPIRATION RATE: 18 BRPM

## 2025-07-02 DIAGNOSIS — E55.9 VITAMIN D DEFICIENCY: ICD-10-CM

## 2025-07-02 DIAGNOSIS — E03.9 HYPOTHYROIDISM, UNSPECIFIED TYPE: ICD-10-CM

## 2025-07-02 DIAGNOSIS — R00.0 TACHYCARDIA: Primary | ICD-10-CM

## 2025-07-02 DIAGNOSIS — E66.01 CLASS 3 SEVERE OBESITY WITH SERIOUS COMORBIDITY AND BODY MASS INDEX (BMI) OF 50.0 TO 59.9 IN ADULT, UNSPECIFIED OBESITY TYPE: ICD-10-CM

## 2025-07-02 DIAGNOSIS — N28.9 RENAL INSUFFICIENCY: ICD-10-CM

## 2025-07-02 DIAGNOSIS — I10 PRIMARY HYPERTENSION: ICD-10-CM

## 2025-07-02 DIAGNOSIS — E78.2 MIXED HYPERLIPIDEMIA: ICD-10-CM

## 2025-07-02 DIAGNOSIS — E66.813 CLASS 3 SEVERE OBESITY WITH SERIOUS COMORBIDITY AND BODY MASS INDEX (BMI) OF 50.0 TO 59.9 IN ADULT, UNSPECIFIED OBESITY TYPE: ICD-10-CM

## 2025-07-02 PROBLEM — M06.09 RHEUMATOID ARTHRITIS OF MULTIPLE SITES WITH NEGATIVE RHEUMATOID FACTOR: Status: RESOLVED | Noted: 2020-09-02 | Resolved: 2025-07-02

## 2025-07-02 PROCEDURE — 1159F MED LIST DOCD IN RCRD: CPT | Mod: CPTII,S$GLB,, | Performed by: NURSE PRACTITIONER

## 2025-07-02 PROCEDURE — 3077F SYST BP >= 140 MM HG: CPT | Mod: CPTII,S$GLB,, | Performed by: NURSE PRACTITIONER

## 2025-07-02 PROCEDURE — 1160F RVW MEDS BY RX/DR IN RCRD: CPT | Mod: CPTII,S$GLB,, | Performed by: NURSE PRACTITIONER

## 2025-07-02 PROCEDURE — 3079F DIAST BP 80-89 MM HG: CPT | Mod: CPTII,S$GLB,, | Performed by: NURSE PRACTITIONER

## 2025-07-02 PROCEDURE — 3044F HG A1C LEVEL LT 7.0%: CPT | Mod: CPTII,S$GLB,, | Performed by: NURSE PRACTITIONER

## 2025-07-02 PROCEDURE — 99214 OFFICE O/P EST MOD 30 MIN: CPT | Mod: S$GLB,,, | Performed by: NURSE PRACTITIONER

## 2025-07-02 PROCEDURE — 3008F BODY MASS INDEX DOCD: CPT | Mod: CPTII,S$GLB,, | Performed by: NURSE PRACTITIONER

## 2025-07-02 RX ORDER — NEBIVOLOL 10 MG/1
10 TABLET ORAL NIGHTLY
Qty: 30 TABLET | Refills: 2 | Status: SHIPPED | OUTPATIENT
Start: 2025-07-02 | End: 2026-07-02

## 2025-07-02 NOTE — PROGRESS NOTES
"Patient ID: Erica Law  MRN: [id number]      History of Present Illness:  The patient is a 45 y.o. Black or  female who presents to clinic for evaluation and management with a chief complaint of Follow-up     Erica is actually here to request a referral to a bariatric surgeon, specifically Dr. Morgan as he is in her network.   Her heart rate and bp were elevated at her last apt as they are today. She said she does not drink a lot of caffeine, however she does vape with nicotine and said it is "a lot ".   We discussed starting a medication to reduce her heart rate while also lowering bp some and she is agreeable.     Allergies: Patient has no known allergies.     Smoking status:  Tobacco Use History[1]    Problem List:  Problem List[2]     Current Medications:  Current Outpatient Medications   Medication Instructions    atorvastatin (LIPITOR) 40 MG tablet TAKE 1 TABLET BY MOUTH EVERY DAY    busPIRone (BUSPAR) 7.5 MG tablet     CAPLYTA 42 mg Cap     furosemide (LASIX) 20 mg    gabapentin (NEURONTIN) 800 mg, Daily    levothyroxine (SYNTHROID) 50 mcg, Oral, Before breakfast    MOUNJARO 15 mg, Subcutaneous, Every 7 days    nebivoloL (BYSTOLIC) 10 mg, Oral, Nightly    NIFEdipine (PROCARDIA-XL) 90 MG (OSM) 24 hr tablet              Review of Systems   Constitutional: Negative.    HENT: Negative.     Eyes: Negative.    Respiratory: Negative.     Cardiovascular: Negative.    Gastrointestinal: Negative.    Endocrine: Negative.    Genitourinary: Negative.    Musculoskeletal: Negative.    Integumentary:  Negative.   Allergic/Immunologic: Negative.    Neurological: Negative.    Hematological: Negative.    Psychiatric/Behavioral: Negative.          Visit Vitals  BP (!) 140/84 (BP Location: Left arm, Patient Position: Sitting)   Pulse (!) 114   Resp 18   Ht 5' (1.524 m)   Wt 136 kg (299 lb 12.8 oz)   LMP  (LMP Unknown)   SpO2 98%   BMI 58.55 kg/m²       Physical Exam  Vitals and nursing note reviewed. "   Constitutional:       Appearance: Normal appearance.   HENT:      Head: Normocephalic.      Nose: Nose normal.      Mouth/Throat:      Mouth: Mucous membranes are moist.      Pharynx: Oropharynx is clear.   Eyes:      Conjunctiva/sclera: Conjunctivae normal.   Cardiovascular:      Rate and Rhythm: Regular rhythm. Tachycardia present.   Pulmonary:      Effort: Pulmonary effort is normal.      Breath sounds: Normal breath sounds.   Abdominal:      General: Bowel sounds are normal.      Palpations: Abdomen is soft.   Musculoskeletal:         General: Normal range of motion.      Cervical back: Normal range of motion.   Skin:     General: Skin is warm and dry.   Neurological:      General: No focal deficit present.      Mental Status: She is alert.   Psychiatric:         Mood and Affect: Mood normal.         Behavior: Behavior normal.          Assessment & Plan:  1. Tachycardia  -     nebivoloL (BYSTOLIC) 10 MG Tab; Take 1 tablet (10 mg total) by mouth every evening.  Dispense: 30 tablet; Refill: 2  Stop vaping , nicotine increases risk of heart disease and her heart is working very hard at rest with a rate persistently greater than 100.     2. Primary hypertension  -     nebivoloL (BYSTOLIC) 10 MG Tab; Take 1 tablet (10 mg total) by mouth every evening.  Dispense: 30 tablet; Refill: 2  Continue the procardia , reduce sodium intake     3. Vitamin D deficiency  -     Vitamin D; Future; Expected date: 07/02/2025    4. Hypothyroidism, unspecified type  -     TSH; Future; Expected date: 07/02/2025  -     T4, Free  Pt was instructed /reminded to take thyroid medication on an empty stomach with full glass of water. Do not take with vitamins or stomach medications. Do not take within 4 hours of any iron tablets. Important to take daily with no missed doses.  May adjust dose once labs are reviewed.     5. Class 3 severe obesity with serious comorbidity and body mass index (BMI) of 50.0 to 59.9 in adult, unspecified obesity  type  -     Ambulatory referral/consult to Bariatric Surgery; Future; Expected date: 07/09/2025  Referral to be sent to Dr. Morgan per her request     6. Mixed hyperlipidemia  -     Lipid Panel; Future; Expected date: 07/02/2025  Need fasting lipid level as the last one drawn showed an elevation in her LDL cholesterol     7. Renal insufficiency  -     Comprehensive Metabolic Panel; Future; Expected date: 07/02/2025  cted date: 07/09/2025  NO NSAIDS, increase water inake          Future Appointments   Date Time Provider Department Center   7/22/2025  1:00 PM Konomos, Nasreen E., NP [facility]   8/7/2025  1:00 PM Nasreen Mcdonald NP [facility] [address]       Lab Frequency Next Occurrence   Ambulatory referral/consult to Nutrition Services Once 06/11/2024   Ambulatory referral/consult to Nephrology Once 06/18/2024   Ambulatory referral/consult to Obstetrics / Gynecology Once 06/24/2024   Ambulatory referral/consult to Smoking Cessation Program Once 08/09/2024   Mammo Digital Diagnostic Bilat with Artis Once 10/24/2024   US Breast Bilateral Limited Once 10/24/2024   Vitamin D Once 04/15/2025   Anti-DNA antibody, double-stranded     C3 complement     C4 complement     Urinalysis         Follow up in about 4 weeks (around 7/30/2025).      Nasreen Mcdonald NP                [1]   Social History  Tobacco Use   Smoking Status Every Day    Types: Vaping with nicotine    Passive exposure: Past   Smokeless Tobacco Never   [2]   Patient Active Problem List  Diagnosis    Class 3 severe obesity with serious comorbidity and body mass index (BMI) of 45.0 to 49.9 in adult    Hidradenitis suppurativa    Generalized anxiety disorder    Hypothyroidism    Hyperlipidemia    Positive SHELLY (antinuclear antibody)    Undifferentiated inflammatory arthritis    Elevated sed rate    Elevated C-reactive protein (CRP)    Anemia    Nexplanon in place    Colitis    Hospital discharge follow-up    Fever    Dehydration    Type 2 diabetes  mellitus, without long-term current use of insulin    Primary hypertension    Immunodeficiency disorder    Insomnia    Stage 3b chronic kidney disease    BMI 50.0-59.9, adult    Dysmenorrhea    Menorrhagia with regular cycle

## 2025-07-03 ENCOUNTER — RESULTS FOLLOW-UP (OUTPATIENT)
Dept: FAMILY MEDICINE | Facility: CLINIC | Age: 45
End: 2025-07-03

## 2025-07-03 LAB
ALBUMIN SERPL-MCNC: 3.7 G/DL (ref 3.5–5.2)
ALBUMIN/GLOB SERPL ELPH: 0.8 {RATIO} (ref 1–2.7)
ALP ISOS SERPL LEV INH-CCNC: 102 U/L (ref 35–105)
ALT (SGPT): 12 U/L (ref 0–33)
ANION GAP SERPL CALC-SCNC: 15 MMOL/L (ref 8–17)
AST SERPL-CCNC: 14 U/L (ref 0–32)
BILIRUBIN, TOTAL: 0.29 MG/DL (ref 0–1.2)
BUN/CREAT SERPL: 13.7 (ref 6–20)
CALCIUM SERPL-MCNC: 9.3 MG/DL (ref 8.6–10.2)
CARBON DIOXIDE, CO2: 16 MMOL/L (ref 22–29)
CHLORIDE: 105 MMOL/L (ref 98–107)
CHOLEST SERPL-MCNC: 244 MG/DL (ref 0–150)
CHOLEST SERPL-MSCNC: 180 MG/DL (ref 100–200)
CREAT SERPL-MCNC: 1.55 MG/DL (ref 0.5–0.9)
GFR ESTIMATION: 42.11 ML/MIN/1.73M2
GLOBULIN: 4.6 G/DL (ref 1.5–4.5)
GLUCOSE: 231 MG/DL (ref 74–106)
HDLC SERPL-MCNC: 45 MG/DL
LDL/HDL RATIO: 1.9 (ref 1–3)
LDLC SERPL CALC-MCNC: 86.2 MG/DL (ref 0–100)
POTASSIUM: 4.6 MMOL/L (ref 3.5–5.1)
PROT SNV-MCNC: 8.3 G/DL (ref 6.4–8.3)
SODIUM: 136 MMOL/L (ref 136–145)
T4, FREE: 1.09 NG/DL (ref 0.93–1.7)
TSH SERPL DL<=0.005 MIU/L-ACNC: 2.5 UIU/ML (ref 0.27–4.2)
UREA NITROGEN (BUN): 21.2 MG/DL (ref 6–20)
VITAMIN D (25OHD): 10.1 NG/ML

## 2025-07-03 RX ORDER — ERGOCALCIFEROL 1.25 MG/1
50000 CAPSULE ORAL
Qty: 12 CAPSULE | Refills: 1 | Status: SHIPPED | OUTPATIENT
Start: 2025-07-03

## 2025-07-17 ENCOUNTER — PATIENT MESSAGE (OUTPATIENT)
Dept: FAMILY MEDICINE | Facility: CLINIC | Age: 45
End: 2025-07-17
Payer: MEDICARE

## 2025-07-18 ENCOUNTER — PATIENT MESSAGE (OUTPATIENT)
Dept: FAMILY MEDICINE | Facility: CLINIC | Age: 45
End: 2025-07-18
Payer: MEDICARE

## 2025-07-21 ENCOUNTER — PATIENT MESSAGE (OUTPATIENT)
Dept: FAMILY MEDICINE | Facility: CLINIC | Age: 45
End: 2025-07-21
Payer: MEDICARE

## 2025-07-21 RX ORDER — NIFEDIPINE 90 MG/1
90 TABLET, EXTENDED RELEASE ORAL DAILY
Qty: 90 TABLET | Refills: 2 | Status: SHIPPED | OUTPATIENT
Start: 2025-07-21 | End: 2025-10-19

## 2025-07-23 DIAGNOSIS — I10 PRIMARY HYPERTENSION: ICD-10-CM

## 2025-07-23 DIAGNOSIS — R00.0 TACHYCARDIA: ICD-10-CM

## 2025-07-24 RX ORDER — NEBIVOLOL 10 MG/1
10 TABLET ORAL NIGHTLY
Qty: 30 TABLET | Refills: 2 | Status: SHIPPED | OUTPATIENT
Start: 2025-07-24 | End: 2026-07-24

## 2025-08-14 ENCOUNTER — TELEPHONE (OUTPATIENT)
Dept: SMOKING CESSATION | Facility: CLINIC | Age: 45
End: 2025-08-14
Payer: MEDICARE

## 2025-08-29 ENCOUNTER — PATIENT MESSAGE (OUTPATIENT)
Dept: ADMINISTRATIVE | Facility: HOSPITAL | Age: 45
End: 2025-08-29
Payer: MEDICARE